# Patient Record
Sex: FEMALE | Race: WHITE | NOT HISPANIC OR LATINO | Employment: FULL TIME | URBAN - METROPOLITAN AREA
[De-identification: names, ages, dates, MRNs, and addresses within clinical notes are randomized per-mention and may not be internally consistent; named-entity substitution may affect disease eponyms.]

---

## 2023-03-06 LAB
ABO GROUP (TYPE) IN BLOOD: NORMAL
ANTIBODY SCREEN: NORMAL
DEHYDROEPIANDROSTERONE SULFATE (DHEA-S) (UG/DL) IN SER/: 103 UG/DL (ref 12–379)
ESTIMATED AVERAGE GLUCOSE FOR HBA1C: 103 MG/DL
HEMOGLOBIN A1C/HEMOGLOBIN TOTAL IN BLOOD: 5.2 %
HEPATITIS B VIRUS SURFACE AG PRESENCE IN SERUM: NONREACTIVE
HEPATITIS C VIRUS AB PRESENCE IN SERUM: NONREACTIVE
HIV 1/ 2 AG/AB SCREEN: NONREACTIVE
RH FACTOR: NORMAL
SYPHILIS TOTAL AB: NONREACTIVE
VARICELLA ZOSTER IGG: POSITIVE

## 2023-03-07 ENCOUNTER — TELEPHONE (OUTPATIENT)
Dept: PRIMARY CARE | Facility: CLINIC | Age: 33
End: 2023-03-07

## 2023-03-07 LAB
CHLAMYDIA TRACH., AMPLIFIED: NEGATIVE
N. GONORRHEA, AMPLIFIED: NEGATIVE

## 2023-03-10 LAB — ANTI-MULLERIAN HORMONE (AMH): 0.57 NG/ML (ref 0.18–11.71)

## 2023-03-13 LAB — 17-HYDROXYPROGESTERONE (REFLAB): 255.6 NG/DL

## 2023-03-17 LAB
TESTOSTERONE FREE (CHAN): 2.3 PG/ML (ref 0.1–6.4)
TESTOSTERONE,TOTAL,LC-MS/MS: 42 NG/DL (ref 2–45)

## 2023-04-19 ENCOUNTER — TELEPHONE (OUTPATIENT)
Dept: PRIMARY CARE | Facility: CLINIC | Age: 33
End: 2023-04-19

## 2023-05-19 LAB — PROGESTERONE (NG/ML) IN SER/PLAS: 0.9 NG/ML

## 2023-05-24 LAB
ESTRADIOL (PG/ML) IN SER/PLAS: 140 PG/ML
PROGESTERONE (NG/ML) IN SER/PLAS: 0.3 NG/ML

## 2023-05-31 LAB
ESTRADIOL (PG/ML) IN SER/PLAS: 116 PG/ML
PROGESTERONE (NG/ML) IN SER/PLAS: 0.6 NG/ML

## 2023-06-19 LAB
ESTRADIOL (PG/ML) IN SER/PLAS: 236 PG/ML
HEMATOCRIT (%) IN BLOOD BY AUTOMATED COUNT: 41 % (ref 36–46)

## 2023-06-23 LAB — ESTRADIOL (PG/ML) IN SER/PLAS: 120 PG/ML

## 2023-06-25 LAB
ESTRADIOL (PG/ML) IN SER/PLAS: 239 PG/ML
PROGESTERONE (NG/ML) IN SER/PLAS: 0.2 NG/ML

## 2023-06-27 LAB
ESTRADIOL (PG/ML) IN SER/PLAS: 534 PG/ML
PROGESTERONE (NG/ML) IN SER/PLAS: 0.6 NG/ML

## 2023-06-29 LAB
ESTRADIOL (PG/ML) IN SER/PLAS: 968 PG/ML
PROGESTERONE (NG/ML) IN SER/PLAS: 0.7 NG/ML

## 2023-07-10 DIAGNOSIS — G43.109 MIGRAINE WITH AURA AND WITHOUT STATUS MIGRAINOSUS, NOT INTRACTABLE: ICD-10-CM

## 2023-07-10 DIAGNOSIS — F41.9 ANXIETY: ICD-10-CM

## 2023-07-10 DIAGNOSIS — F43.0 ACUTE REACTION TO STRESS: Primary | ICD-10-CM

## 2023-07-10 RX ORDER — ATOGEPANT 60 MG/1
60 TABLET ORAL DAILY
Qty: 30 TABLET | Refills: 0 | Status: SHIPPED | OUTPATIENT
Start: 2023-07-10 | End: 2023-08-23 | Stop reason: SDUPTHER

## 2023-07-10 RX ORDER — ESCITALOPRAM OXALATE 10 MG/1
1 TABLET ORAL DAILY
COMMUNITY
Start: 2022-11-16 | End: 2023-07-10 | Stop reason: SDUPTHER

## 2023-07-10 RX ORDER — ESCITALOPRAM OXALATE 10 MG/1
10 TABLET ORAL DAILY
Qty: 30 TABLET | Refills: 0 | Status: SHIPPED | OUTPATIENT
Start: 2023-07-10 | End: 2023-08-15 | Stop reason: SDUPTHER

## 2023-07-10 RX ORDER — ATOGEPANT 60 MG/1
60 TABLET ORAL DAILY
COMMUNITY
Start: 2023-05-04 | End: 2023-07-10 | Stop reason: SDUPTHER

## 2023-07-25 LAB
ESTRADIOL (PG/ML) IN SER/PLAS: 506 PG/ML
PROGESTERONE (NG/ML) IN SER/PLAS: 0.6 NG/ML

## 2023-07-28 LAB
ESTRADIOL (PG/ML) IN SER/PLAS: 2863 PG/ML
PROGESTERONE (NG/ML) IN SER/PLAS: 0.5 NG/ML

## 2023-08-03 LAB — PROGESTERONE (NG/ML) IN SER/PLAS: 36.6 NG/ML

## 2023-08-14 LAB — CHORIOGONADOTROPIN (MIU/ML) IN SER/PLAS: 24 MIU/ML

## 2023-08-15 DIAGNOSIS — F41.9 ANXIETY: ICD-10-CM

## 2023-08-15 RX ORDER — ESCITALOPRAM OXALATE 10 MG/1
10 TABLET ORAL DAILY
Qty: 30 TABLET | Refills: 0 | Status: SHIPPED | OUTPATIENT
Start: 2023-08-15 | End: 2023-08-23 | Stop reason: SDUPTHER

## 2023-08-16 LAB — CHORIOGONADOTROPIN (MIU/ML) IN SER/PLAS: 37 MIU/ML

## 2023-08-18 LAB — CHORIOGONADOTROPIN (MIU/ML) IN SER/PLAS: 22 MIU/ML

## 2023-08-23 ENCOUNTER — OFFICE VISIT (OUTPATIENT)
Dept: PRIMARY CARE | Facility: CLINIC | Age: 33
End: 2023-08-23
Payer: COMMERCIAL

## 2023-08-23 VITALS
WEIGHT: 179 LBS | DIASTOLIC BLOOD PRESSURE: 78 MMHG | HEART RATE: 70 BPM | BODY MASS INDEX: 30.73 KG/M2 | SYSTOLIC BLOOD PRESSURE: 120 MMHG

## 2023-08-23 DIAGNOSIS — G43.109 MIGRAINE WITH AURA AND WITHOUT STATUS MIGRAINOSUS, NOT INTRACTABLE: Primary | ICD-10-CM

## 2023-08-23 DIAGNOSIS — F32.A ANXIETY AND DEPRESSION: ICD-10-CM

## 2023-08-23 DIAGNOSIS — F41.9 ANXIETY AND DEPRESSION: ICD-10-CM

## 2023-08-23 PROBLEM — Z15.01 GENETIC PREDISPOSITION TO BREAST CANCER: Status: ACTIVE | Noted: 2023-08-23

## 2023-08-23 PROBLEM — N91.2 AMENORRHEA: Status: RESOLVED | Noted: 2023-08-23 | Resolved: 2023-08-23

## 2023-08-23 PROBLEM — F32.0 DEPRESSION, MAJOR, SINGLE EPISODE, MILD (CMS-HCC): Status: RESOLVED | Noted: 2023-08-23 | Resolved: 2023-08-23

## 2023-08-23 PROBLEM — K50.00 CROHN'S DISEASE OF ILEUM WITHOUT COMPLICATION (MULTI): Status: ACTIVE | Noted: 2023-08-23

## 2023-08-23 PROBLEM — N91.2 AMENORRHEA: Status: ACTIVE | Noted: 2023-08-23

## 2023-08-23 PROBLEM — L50.8 CHRONIC URTICARIA: Status: ACTIVE | Noted: 2023-08-23

## 2023-08-23 PROBLEM — F90.2 ATTENTION DEFICIT HYPERACTIVITY DISORDER (ADHD), COMBINED TYPE, MODERATE: Status: ACTIVE | Noted: 2023-08-23

## 2023-08-23 PROBLEM — F44.5 CONVERSION DISORDER WITH SEIZURES OR CONVULSIONS: Status: ACTIVE | Noted: 2018-06-29

## 2023-08-23 PROBLEM — G43.719 INTRACTABLE CHRONIC MIGRAINE WITHOUT AURA AND WITHOUT STATUS MIGRAINOSUS: Status: RESOLVED | Noted: 2023-08-23 | Resolved: 2023-08-23

## 2023-08-23 PROBLEM — G43.719 INTRACTABLE CHRONIC MIGRAINE WITHOUT AURA AND WITHOUT STATUS MIGRAINOSUS: Status: ACTIVE | Noted: 2023-08-23

## 2023-08-23 PROBLEM — R19.7 DIARRHEA: Status: RESOLVED | Noted: 2023-08-23 | Resolved: 2023-08-23

## 2023-08-23 PROBLEM — F32.0 DEPRESSION, MAJOR, SINGLE EPISODE, MILD (CMS-HCC): Status: ACTIVE | Noted: 2023-08-23

## 2023-08-23 PROBLEM — K58.9 IBS (IRRITABLE BOWEL SYNDROME): Status: ACTIVE | Noted: 2023-08-23

## 2023-08-23 PROBLEM — N92.6 IRREGULAR MENSES: Status: RESOLVED | Noted: 2023-08-23 | Resolved: 2023-08-23

## 2023-08-23 PROBLEM — R19.7 DIARRHEA: Status: ACTIVE | Noted: 2023-08-23

## 2023-08-23 PROBLEM — K37 APPENDICITIS: Status: ACTIVE | Noted: 2020-04-20

## 2023-08-23 PROBLEM — N92.6 IRREGULAR MENSES: Status: ACTIVE | Noted: 2023-08-23

## 2023-08-23 PROBLEM — L50.8 CHRONIC URTICARIA: Status: RESOLVED | Noted: 2023-08-23 | Resolved: 2023-08-23

## 2023-08-23 PROCEDURE — 99214 OFFICE O/P EST MOD 30 MIN: CPT | Performed by: FAMILY MEDICINE

## 2023-08-23 RX ORDER — ATOGEPANT 60 MG/1
60 TABLET ORAL DAILY
Qty: 30 TABLET | Refills: 5 | Status: SHIPPED | OUTPATIENT
Start: 2023-08-23 | End: 2023-12-01 | Stop reason: ALTCHOICE

## 2023-08-23 RX ORDER — ESCITALOPRAM OXALATE 10 MG/1
10 TABLET ORAL DAILY
Qty: 90 TABLET | Refills: 1 | Status: SHIPPED | OUTPATIENT
Start: 2023-08-23 | End: 2023-12-01 | Stop reason: SDUPTHER

## 2023-08-23 ASSESSMENT — PATIENT HEALTH QUESTIONNAIRE - PHQ9
2. FEELING DOWN, DEPRESSED OR HOPELESS: NOT AT ALL
1. LITTLE INTEREST OR PLEASURE IN DOING THINGS: NOT AT ALL
SUM OF ALL RESPONSES TO PHQ9 QUESTIONS 1 AND 2: 0

## 2023-08-23 NOTE — PROGRESS NOTES
Subjective   Patient ID: Archana Mullen is a 33 y.o. female who presents for Anxiety, Depression, and Headache.    HPI  1.  Anxiety and depression.  Currently stable Lexapro 10 mg daily, not wanting to change dose  Feeling pretty good on medication, requesting refill  No SI/HI    2.  Migraine headaches.  Currently using Qulipta daily, and it has been a life-changing medication for her  She is so happy to be on the medication, as she does not have headache symptoms any longer after dealing with so many headaches and failed treatment regimens throughout her life    3.  Update in health.  She has been going through in vitro fertilization, recently became pregnant, miscarried at 4 weeks gestation  She will continue to try through [facility] under the guidance of Dr. Ricarda Rey    Review of Systems  All pertinent positive symptoms are included in the history of present illness.    All other systems have been reviewed and are negative and noncontributory to this patient's current ailments.    Allergies   Allergen Reactions    Bupropion Swelling and Rash    Penicillin V Rash     Penicillin V Potassium TABS       Immunization History   Administered Date(s) Administered    Flu vaccine (IIV4), preservative free *Check age/dose* 11/19/2019, 09/13/2020, 11/15/2021, 11/16/2022    Influenza, Unspecified 12/01/2017, 10/18/2018, 12/10/2018    MMR vaccine, subcutaneous (MMR II) 04/24/2001    Moderna SARS-CoV-2 Vaccination 05/07/2021, 06/08/2021, 12/30/2021    Pneumococcal conjugate vaccine, 13-valent (PREVNAR 13) 10/27/2017    Td (adult), unspecified 04/24/2001    Tdap vaccine, age 10 years and older (BOOSTRIX) 09/26/2015       Objective   Visit Vitals  /78   Pulse 70   Wt 81.2 kg (179 lb)   BMI 30.73 kg/m²   Smoking Status Never   BSA 1.92 m²       Physical Exam  CONSTITUTIONAL - well nourished, well developed, looks like stated age, in no acute distress, not ill-appearing, and not tired appearing  SKIN - normal skin color  and pigmentation, normal skin turgor without rash, lesions, or nodules visualized  HEAD - no trauma, normocephalic  EYES - pupils are equal and reactive to light, extraocular muscles are intact, and normal external exam  CHEST - clear to auscultation, no wheezing, no crackles and no rales, good effort  CARDIAC - regular rate and regular rhythm, no skipped beats, no murmur  EXTREMITIES - no edema, no deformities  NEUROLOGICAL - normal gait, normal balance, normal motor, no ataxia, alert, oriented and no focal signs  PSYCHIATRIC - alert, pleasant and cordial, age-appropriate    Assessment/Plan   Problem List Items Addressed This Visit       Migraine with aura and without status migrainosus, not intractable - Primary     Currently stable, no changes to medication recommended         Relevant Medications    atogepant (Qulipta) 60 mg tablet tablet    Anxiety and depression    Relevant Medications    escitalopram (Lexapro) 10 mg tablet

## 2023-08-25 LAB — CHORIOGONADOTROPIN (MIU/ML) IN SER/PLAS: <2 MIU/ML

## 2023-09-06 LAB
ESTRADIOL (PG/ML) IN SER/PLAS: 510 PG/ML
PROGESTERONE (NG/ML) IN SER/PLAS: 0.3 NG/ML

## 2023-09-14 LAB — PROGESTERONE (NG/ML) IN SER/PLAS: 26.6 NG/ML

## 2023-09-25 LAB — CHORIOGONADOTROPIN (MIU/ML) IN SER/PLAS: <2 MIU/ML

## 2023-10-05 DIAGNOSIS — N97.9 FEMALE INFERTILITY: ICD-10-CM

## 2023-10-05 NOTE — PROGRESS NOTES
Patient review with Dr. Whitley advising him that patient would like to proceed with another IVF cycle after her 2nd failed FET.   Dr. Whitley states same IVF stim cycle (Estrace lead in, Micro dose Lupron, /150, ).    Phone call to patient to discuss plan for current cycle is same IVF stim. Patient to have CD23 P4 level done on 10/23/23 (order placed). Patient will call office to schedule for lab work.     Patient considering PGT-A testing with this cycle but unsure due to cost, is aware to let RN staff know if desires to do this testing early in cycle. Patient verbalizes understanding at this time and denies additional questions.  Reviewed and approved by SETH CASTILLO on 10/5/23 at 5:00 PM.

## 2023-10-18 DIAGNOSIS — N97.9 FEMALE INFERTILITY: ICD-10-CM

## 2023-10-18 DIAGNOSIS — Z31.83 ENCOUNTER FOR ASSISTED REPRODUCTIVE FERTILITY CYCLE: ICD-10-CM

## 2023-10-18 RX ORDER — LEUPROLIDE ACETATE 1 MG/0.2ML
20 KIT SUBCUTANEOUS
Qty: 1 KIT | Refills: 1 | Status: SHIPPED | OUTPATIENT
Start: 2023-10-18 | End: 2023-12-19 | Stop reason: ALTCHOICE

## 2023-10-18 RX ORDER — ESTRADIOL 2 MG/1
2 TABLET ORAL 2 TIMES DAILY
Qty: 30 TABLET | Refills: 0 | Status: SHIPPED | OUTPATIENT
Start: 2023-10-18 | End: 2023-12-19 | Stop reason: ALTCHOICE

## 2023-10-18 RX ORDER — CHORIONIC GONADOTROPIN 10000 UNIT
10000 KIT INTRAMUSCULAR AS NEEDED
Qty: 1 EACH | Refills: 0 | Status: SHIPPED | OUTPATIENT
Start: 2023-10-18 | End: 2023-12-19 | Stop reason: ALTCHOICE

## 2023-10-19 ENCOUNTER — PHARMACY VISIT (OUTPATIENT)
Dept: PHARMACY | Facility: CLINIC | Age: 33
End: 2023-10-19
Payer: COMMERCIAL

## 2023-10-19 ENCOUNTER — SPECIALTY PHARMACY (OUTPATIENT)
Dept: PHARMACY | Facility: CLINIC | Age: 33
End: 2023-10-19

## 2023-10-19 DIAGNOSIS — N97.9 FEMALE INFERTILITY: ICD-10-CM

## 2023-10-19 PROBLEM — Z14.8 CARRIER OF HIGH RISK CANCER GENE MUTATION: Status: ACTIVE | Noted: 2023-10-19

## 2023-10-19 PROCEDURE — RXMED WILLOW AMBULATORY MEDICATION CHARGE

## 2023-10-19 RX ORDER — ALBUTEROL SULFATE 90 UG/1
AEROSOL, METERED RESPIRATORY (INHALATION)
COMMUNITY
Start: 2019-09-29 | End: 2023-11-11 | Stop reason: ALTCHOICE

## 2023-10-19 RX ORDER — TIZANIDINE 4 MG/1
1 TABLET ORAL 3 TIMES DAILY
COMMUNITY
Start: 2020-10-24 | End: 2023-11-11 | Stop reason: ALTCHOICE

## 2023-10-19 RX ORDER — CHORIOGONADOTROPIN ALFA 250 UG/.5ML
INJECTION, SOLUTION SUBCUTANEOUS
COMMUNITY
Start: 2023-01-18 | End: 2023-11-11 | Stop reason: ALTCHOICE

## 2023-10-19 RX ORDER — MAGNESIUM OXIDE/MAG AA CHELATE 300 MG
1 CAPSULE ORAL DAILY
COMMUNITY
End: 2023-11-11 | Stop reason: ALTCHOICE

## 2023-10-19 RX ORDER — CYCLOBENZAPRINE HCL 10 MG
1 TABLET ORAL NIGHTLY PRN
COMMUNITY
Start: 2020-02-04 | End: 2023-11-11 | Stop reason: ALTCHOICE

## 2023-10-19 RX ORDER — DICYCLOMINE HYDROCHLORIDE 20 MG/1
20 TABLET ORAL 4 TIMES DAILY PRN
COMMUNITY
Start: 2022-08-26 | End: 2023-11-11 | Stop reason: ALTCHOICE

## 2023-10-19 RX ORDER — ESTRADIOL 2 MG/1
3 TABLET ORAL DAILY
COMMUNITY
End: 2023-12-19 | Stop reason: ALTCHOICE

## 2023-10-19 RX ORDER — ERENUMAB-AOOE 140 MG/ML
INJECTION, SOLUTION SUBCUTANEOUS
COMMUNITY
Start: 2020-10-14 | End: 2023-11-11 | Stop reason: ALTCHOICE

## 2023-10-19 RX ORDER — VILAZODONE HYDROCHLORIDE 20 MG/1
1 TABLET ORAL DAILY
COMMUNITY
Start: 2022-05-10 | End: 2023-11-11 | Stop reason: ALTCHOICE

## 2023-10-19 RX ORDER — VILAZODONE HYDROCHLORIDE 10 MG/1
1 TABLET ORAL DAILY
COMMUNITY
Start: 2022-05-25 | End: 2023-11-11 | Stop reason: ALTCHOICE

## 2023-10-19 RX ORDER — SERTRALINE HYDROCHLORIDE 50 MG/1
50 TABLET, FILM COATED ORAL NIGHTLY
COMMUNITY
End: 2023-11-11 | Stop reason: ALTCHOICE

## 2023-10-19 RX ORDER — TOPIRAMATE 25 MG/1
25 TABLET ORAL NIGHTLY
COMMUNITY
End: 2023-11-11 | Stop reason: ALTCHOICE

## 2023-10-19 RX ORDER — LETROZOLE 2.5 MG/1
TABLET, FILM COATED ORAL
COMMUNITY
Start: 2022-12-29 | End: 2023-11-11 | Stop reason: ALTCHOICE

## 2023-10-19 RX ORDER — MELATONIN 3 MG
LOZENGE ORAL
COMMUNITY
End: 2023-11-11 | Stop reason: ALTCHOICE

## 2023-10-19 RX ORDER — GLUC/MSM/COLGN2/HYAL/ANTIARTH3 375-375-20
TABLET ORAL
COMMUNITY
End: 2023-11-11 | Stop reason: ALTCHOICE

## 2023-10-19 RX ORDER — ZINC SULFATE 50(220)MG
1 CAPSULE ORAL DAILY
COMMUNITY
End: 2023-11-11 | Stop reason: ALTCHOICE

## 2023-10-19 RX ORDER — ONDANSETRON 4 MG/1
4 TABLET, FILM COATED ORAL DAILY PRN
COMMUNITY
Start: 2020-04-20 | End: 2023-11-11 | Stop reason: ALTCHOICE

## 2023-10-19 RX ORDER — VARENICLINE TARTRATE 0.5 MG/1
TABLET, FILM COATED ORAL
COMMUNITY
End: 2023-11-11 | Stop reason: ALTCHOICE

## 2023-10-19 RX ORDER — ETONOGESTREL AND ETHINYL ESTRADIOL VAGINAL RING .015; .12 MG/D; MG/D
RING VAGINAL
COMMUNITY
End: 2023-11-11 | Stop reason: ALTCHOICE

## 2023-10-19 RX ORDER — TOPIRAMATE 50 MG/1
CAPSULE, EXTENDED RELEASE ORAL
COMMUNITY
End: 2023-11-11 | Stop reason: ALTCHOICE

## 2023-10-19 RX ORDER — SODIUM CHLORIDE 9 MG/ML
INJECTION INTRAMUSCULAR; INTRAVENOUS; SUBCUTANEOUS
Qty: 10 ML | Refills: 0 | Status: SHIPPED | OUTPATIENT
Start: 2023-10-19 | End: 2024-03-11 | Stop reason: ALTCHOICE

## 2023-10-19 RX ORDER — NAPROXEN 500 MG/1
500 TABLET ORAL
COMMUNITY
Start: 2020-10-24 | End: 2023-11-11 | Stop reason: ALTCHOICE

## 2023-10-19 RX ORDER — MEDROXYPROGESTERONE ACETATE 10 MG/1
10 TABLET ORAL
COMMUNITY
Start: 2022-09-22 | End: 2023-11-11 | Stop reason: ALTCHOICE

## 2023-10-20 ENCOUNTER — PHARMACY VISIT (OUTPATIENT)
Dept: PHARMACY | Facility: CLINIC | Age: 33
End: 2023-10-20
Payer: COMMERCIAL

## 2023-10-20 ENCOUNTER — ANCILLARY PROCEDURE (OUTPATIENT)
Dept: ENDOCRINOLOGY | Facility: CLINIC | Age: 33
End: 2023-10-20
Payer: COMMERCIAL

## 2023-10-20 DIAGNOSIS — N97.9 FEMALE INFERTILITY: ICD-10-CM

## 2023-10-20 LAB — PROGEST SERPL-MCNC: 9.7 NG/ML

## 2023-10-20 PROCEDURE — 36415 COLL VENOUS BLD VENIPUNCTURE: CPT

## 2023-10-20 PROCEDURE — 84144 ASSAY OF PROGESTERONE: CPT

## 2023-10-20 PROCEDURE — RXMED WILLOW AMBULATORY MEDICATION CHARGE

## 2023-10-20 NOTE — PROGRESS NOTES
P4 today for Estrace Lead In for a Microdose Lupron IVF cycle.   Shmuel Troy RN       [facility] PROVIDER NOTE  Ultrasound and/or labs reviewed at [facility].   Results for orders placed or performed in visit on 10/20/23 (from the past 96 hour(s))   Progesterone   Result Value Ref Range    Progesterone 9.7 ng/mL       Patient to start estrace lead in, will call with menses.    David Whitley    Phone call to patient, reviewed plan as noted above, patient verbalizes understanding at this time and denies additional questions.   Shmuel Troy RN 10/20/23 @ 1034

## 2023-10-25 ENCOUNTER — DOCUMENTATION (OUTPATIENT)
Dept: ENDOCRINOLOGY | Facility: CLINIC | Age: 33
End: 2023-10-25

## 2023-10-25 ENCOUNTER — TELEPHONE (OUTPATIENT)
Dept: ENDOCRINOLOGY | Facility: CLINIC | Age: 33
End: 2023-10-25

## 2023-10-25 ENCOUNTER — ANCILLARY PROCEDURE (OUTPATIENT)
Dept: ENDOCRINOLOGY | Facility: CLINIC | Age: 33
End: 2023-10-25
Payer: COMMERCIAL

## 2023-10-25 NOTE — PROGRESS NOTES
Called to review offsite storage mode. This is different than her last stim so wanted to advise. Aware we can no longer offer long term storage here at . Can choose [facility] or any other company as desired. She can let us know. Will upload consents to [facility] for her. Will ask Shae to reach out when available. All questions answered.   Ricarda Rey MD

## 2023-10-25 NOTE — PROGRESS NOTES
Boarding Pass IVF Checklist    Age: 33 y.o.  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  Last BMI  08/23/23 : 30.75 kg/m²       Past Medical History:   Diagnosis Date    Anxiety disorder, unspecified 11/16/2022    Anxiety    Chronic migraine without aura, intractable, with status migrainosus 08/01/2022    Chronic migraine without aura, with intractable migraine, so stated, with status migrainosus    Chronic migraine without aura, intractable, without status migrainosus 11/16/2022    Intractable chronic migraine without aura and without status migrainosus    Crohn's disease of small intestine without complications (CMS/HCC) 08/18/2022    Crohn's disease of ileum without complication    Other urticaria 11/11/2021    Chronic urticaria    Papillomavirus as the cause of diseases classified elsewhere     HPV in female    Personal history of other diseases of the female genital tract     History of abnormal cervical Papanicolaou smear     Ectopic Risk: N    Date Done Consultation Results/Comments   10/5/23 Medication Protocol Lead in: estrace  Stimulation protocol: Microdose Flare ,   Trigger plan: HCG  Pre-retrieval meds: Antibiotics per protocol  Adjuncts:   Notes:    5/17/23 IVF Consult   IVF Consult: Yes  PGT-A/M? No   10/27/23 IVF Consent Form Enroll in EngagedMD: Yes (Shmuel Troy RN)  Received and in chart: Yes (Shmuel Troy RN)   10/27/23 [facility] Waiver (out) Form Enroll in EngagedMD:   Received and in chart:    10/27.23 [employer] Transfer Authorization Form Enroll in EngagedMD: Yes  Received and in chart: Yes   N/A Insurance Financially Cleared? N/A  Procedure Order Placed? Yes   4/11/23 Financial Consult Yes   5/11/23 PAT Questionnaire  Anesthesia consult?: N   6/19/23 Nursing Teaching: Yes (Shmuel Troy RN)  Cycle Calendar: Yes (Shmuel Troy RN)  SART: N/A   N/A Genetic Carrier Screening Clearance     N/A MFM Consult Okay to proceed? N/A   N/A Psych Consult Okay to proceed? N/A   N/A Genetics Consult Okay to proceed? N/A    Other    Date Done Female Labs Results/Comments   3/6/2023 T&S (Q 1 Year) ABO: O  Rh: POS  Antibody: NEG   3/6/2023 Hep B sAg NONREACTIVE   3/6/2023 Hep C AB NONREACTIVE   3/6/2023 HIV NONREACTIVE   3/6/2023 Syphilis NONREACTIVE   3/6/2023 GC/CT GC: NEGATIVE  CT: NEGATIVE   2/21/29 Rubella (Q 5 Years) IMMUNE     3/6/2023 Varicella (Q 5 Years) POSITIVE     8/12/22 TSH 1.02   3/6/2023 HgbA1C 5.2 (Ref range: %)   3/6/2023 AMH 0.572   3/6/23 Carrier Screening Myriad 2bP: N/A  Authorization completed  Pos Ataxial telangiectasia, familial hyperinsulinism  ABCC8 related   3/17/23 Uterine Cavity Eval HSG: N/A    SIS: N/A    Hyster: (3/17/23 NL    Trial Transfer N/A   3/20/23 Pap Smear (Q 5 Years) NEG  HPV: NEG   N/A Mammogram ( > 40) (Q 1 Year) N/A               Date Done Male Labs  CRISTHIAN SALVADOR (MRN: [id number]) Results/Comments   3/6/2023 Hep B sAg NONREACTIVE   3/6/2023 Hep C AB  NONREACTIVE   3/6/2023 HIV NONREACTIVE   3/6/2023 Syphilis NONREACTIVE   3/6/2023 GC/CT GC: NEGATIVE  CT: NEGATIVE   3/20/23 Carrier Screening N/A  Authorization completed  Pos Congenital disorder glycosylation Type 1a, Cystinosis, Nephrotic Syndrome NPHS2 related   N/A Semen Analysis  Will connect fresh and has one frozen vial   6/23/23 Sperm Freeze  # of vials: 1  TMS post thaw: N/A   Date Done Miscellaneous Results/Comments             **Does not need to be completed prior to placing on IVF calendar**    Needs phone call from MD regarding new cryo shipment plan and will need new IVF consent and [facility] waiver OUT form prior to proceeding.  Ricarda Rey MD    Addendum: Both are now complete. Ok to proceed.   Ricarda Rey MD

## 2023-10-25 NOTE — TELEPHONE ENCOUNTER
Returned patient's call, started cycle today. Will take to noon huddle to discuss stim start and will call this afternoon with plan. Patient in agreement.  Shmuel Troy RN

## 2023-10-25 NOTE — PROGRESS NOTES
Called today with CD#1 to proceed with another IVF cycle.  Estrace lead in, Micro Flare , , Pregnyl Trigger.  Tentative plan:  Baseline US and labs on 10/26/23  Start Lupron 20 units evening of 10/27/23, then BID  Start stim meds evening of 10/28/23  RTC on 10/31/23 for repeat US and labs.    New BP needs reviewed for clearance to proceed.  Shmuel Troy RN     Agree with above plan and will review baseline tomorrow. Will review boarding pass today with FOREST Troy.   Ricarda Rey MD    Phone call to patient, reviewed plan as noted above, patient verbalizes understanding at this time and denies additional questions.   Shmuel Troy RN

## 2023-10-26 ENCOUNTER — ANCILLARY PROCEDURE (OUTPATIENT)
Dept: ENDOCRINOLOGY | Facility: CLINIC | Age: 33
End: 2023-10-26
Payer: COMMERCIAL

## 2023-10-26 DIAGNOSIS — N97.9 FEMALE INFERTILITY: ICD-10-CM

## 2023-10-26 LAB — ESTRADIOL SERPL-MCNC: 398 PG/ML

## 2023-10-26 PROCEDURE — 82670 ASSAY OF TOTAL ESTRADIOL: CPT

## 2023-10-26 PROCEDURE — 76857 US EXAM PELVIC LIMITED: CPT | Performed by: OBSTETRICS & GYNECOLOGY

## 2023-10-26 PROCEDURE — 36415 COLL VENOUS BLD VENIPUNCTURE: CPT

## 2023-10-26 PROCEDURE — 76857 US EXAM PELVIC LIMITED: CPT

## 2023-10-26 RX ORDER — LEUPROLIDE ACETATE 1 MG/0.2ML
20 KIT SUBCUTANEOUS EVERY MORNING
Qty: 1 KIT | Refills: 0 | Status: SHIPPED
Start: 2023-10-26 | End: 2023-12-19 | Stop reason: ALTCHOICE

## 2023-10-26 RX ORDER — LEUPROLIDE ACETATE 1 MG/0.2ML
20 KIT SUBCUTANEOUS EVERY EVENING
Qty: 1 KIT | Refills: 0 | Status: SHIPPED
Start: 2023-10-26 | End: 2023-12-19 | Stop reason: ALTCHOICE

## 2023-10-26 NOTE — PROGRESS NOTES
CYCLING NOTE    Here for US and/or lab monitoring for IVF; relevant findings reviewed.  Plan is for patient to start lupron 20 units on 10/27 and start stim meds on 10/28.   Patient did not stay for discussion after monitoring,  Team will contact patient later today with results and plan.    Archana Beckhamyarelymark  10/26/2023  1:37 PM    [facility] PROVIDER NOTE - BASELINE  Ultrasound and/or labs reviewed at [facility].     Major medical diagnoses:   no  Past Medical History:   Diagnosis Date    Anxiety disorder, unspecified 11/16/2022    Anxiety    Chronic migraine without aura, intractable, with status migrainosus 08/01/2022    Chronic migraine without aura, with intractable migraine, so stated, with status migrainosus    Chronic migraine without aura, intractable, without status migrainosus 11/16/2022    Intractable chronic migraine without aura and without status migrainosus    Crohn's disease of small intestine without complications (CMS/HCC) 08/18/2022    Crohn's disease of ileum without complication    Other urticaria 11/11/2021    Chronic urticaria    Papillomavirus as the cause of diseases classified elsewhere     HPV in female    Personal history of other diseases of the female genital tract     History of abnormal cervical Papanicolaou smear      Past Surgical History:   Procedure Laterality Date    OTHER SURGICAL HISTORY  12/21/2020    Colonoscopy    OTHER SURGICAL HISTORY  12/21/2020    Esophagogastroduodenoscopy    OTHER SURGICAL HISTORY  04/09/2021    Ovarian cystectomy    TONSILLECTOMY  12/07/2015    Tonsillectomy       Boarding pass   Yes    If indicated, is PAT consult complete?  No    SPERM:  Yes  partner  Fresh with Frozen Backup (retrograde ejaculation, will collect fresh from urine)  Number of vials confirmed: 1    Results for orders placed or performed in visit on 10/26/23 (from the past 96 hour(s))   Estradiol   Result Value Ref Range    Estradiol 398 pg/mL     Fri 10/27 (Day 3)   leuprolide injection:  Inject 20 Units once daily in the evening under the skin    Sat 10/28 (Day 4)   follitropin beta injection: Inject 300 Units once daily in the evening under the skin   menotropins recon soln injection: Inject 150 Units once daily in the evening under the skin   leuprolide injection: Inject 20 Units once daily in the morning under the skin   leuprolide injection: Inject 20 Units once daily in the evening under the skin    Sun 10/29 (Day 5)   follitropin beta injection: Inject 300 Units once daily in the evening under the skin   menotropins recon soln injection: Inject 150 Units once daily in the evening under the skin   leuprolide injection: Inject 20 Units once daily in the morning under the skin   leuprolide injection: Inject 20 Units once daily in the evening under the skin    Mon 10/30 (Day 6)   follitropin beta injection: Inject 300 Units once daily in the evening under the skin   menotropins recon soln injection: Inject 150 Units once daily in the evening under the skin   leuprolide injection: Inject 20 Units once daily in the morning under the skin   leuprolide injection: Inject 20 Units once daily in the evening under the skin    Tue 10/31 (Day 7)   leuprolide injection: Inject 20 Units once daily in the morning under the skin    RTC in  TUESDAY  for Follicle scan and Estradiol.   She cannot start injections until she and partner sign their engaged MD forms.     Ricarda Rey    Called pt to discuss plan. I told her she and her partner must sign engaged MD forms today. She is aware I resent them today and she is to check her email.   Reviewed medication plan. Pointed out to pt that the calendar Shae GARG sent her yesterday has her follistim dose incorrect 10/29-11/4; she is to take 300 international units  follistim every evening. She said she noticed the discrepancy and will change her calendar.   All meds reviewed. Will also send [employer] message with plan.  T/F to desk to schedule for 10/31. Pt verbalized  understanding and is agreeable.   CASIMIRO CALVERT on 10/26/23 at 3:04 PM.

## 2023-10-31 ENCOUNTER — ANCILLARY PROCEDURE (OUTPATIENT)
Dept: ENDOCRINOLOGY | Facility: CLINIC | Age: 33
End: 2023-10-31
Payer: COMMERCIAL

## 2023-10-31 DIAGNOSIS — N97.9 FEMALE INFERTILITY: ICD-10-CM

## 2023-10-31 LAB — ESTRADIOL SERPL-MCNC: 32 PG/ML

## 2023-10-31 PROCEDURE — 76857 US EXAM PELVIC LIMITED: CPT | Performed by: OBSTETRICS & GYNECOLOGY

## 2023-10-31 PROCEDURE — 82670 ASSAY OF TOTAL ESTRADIOL: CPT

## 2023-10-31 PROCEDURE — 76857 US EXAM PELVIC LIMITED: CPT

## 2023-10-31 PROCEDURE — 36415 COLL VENOUS BLD VENIPUNCTURE: CPT

## 2023-10-31 NOTE — PROGRESS NOTES
CYCLING NOTE    Here for US and/or lab monitoring; relevant findings reviewed.    Patient did not stay for discussion after monitoring,  Team will contact patient later today with results and plan.    Harriet Vazquez  10/31/2023  10:07 AM    [facility] PROVIDER NOTE  Ultrasound and/or labs reviewed at [facility].   Results for orders placed or performed in visit on 10/31/23 (from the past 96 hour(s))   Estradiol   Result Value Ref Range    Estradiol 32 pg/mL     Tue 10/31 (Day 7)   follitropin beta injection: Inject 300 Units once daily in the evening under the skin   menotropins recon soln injection: Inject 150 Units once daily in the evening under the skin   leuprolide injection: Inject 20 Units once daily in the morning under the skin   leuprolide injection: Inject 20 Units once daily in the evening under the skin    Wed 11/1 (Day 8)   follitropin beta injection: Inject 300 Units once daily in the evening under the skin   menotropins recon soln injection: Inject 150 Units once daily in the evening under the skin   leuprolide injection: Inject 20 Units once daily in the morning under the skin   leuprolide injection: Inject 20 Units once daily in the evening under the skin    Thu 11/2 (Day 9)   follitropin beta injection: Inject 300 Units once daily in the evening under the skin   menotropins recon soln injection: Inject 150 Units once daily in the evening under the skin   leuprolide injection: Inject 20 Units once daily in the morning under the skin   leuprolide injection: Inject 20 Units once daily in the evening under the skin    RTC in three days for Follicle scan and Estradiol.   Monalisa Mesa    Spoke to patient re: plan and she verbalized understanding.  Transferred to  to make apt for 11/3.  Harriet Vazquez RN 10/31/23 2:25 PM

## 2023-11-01 DIAGNOSIS — Z31.41 FERTILITY TESTING: ICD-10-CM

## 2023-11-02 ENCOUNTER — SPECIALTY PHARMACY (OUTPATIENT)
Dept: PHARMACY | Facility: CLINIC | Age: 33
End: 2023-11-02

## 2023-11-02 ENCOUNTER — PHARMACY VISIT (OUTPATIENT)
Dept: PHARMACY | Facility: CLINIC | Age: 33
End: 2023-11-02
Payer: COMMERCIAL

## 2023-11-02 PROCEDURE — RXMED WILLOW AMBULATORY MEDICATION CHARGE

## 2023-11-02 NOTE — PROGRESS NOTES
Pt called and had a question regarding storage/stability of Follistim.     Had a cartridge that she used back during July stimulation cycle still in her fridge- confirmed it had been punctured and used.     Advised that once cartridge is pierced, must be stored at 36°F to 77°F and used within 28 days and that she should discard.       Salena Camejo (Katie), PharmD  [facility]  Clinical Pharmacy Specialist- Fertility   [facility], [place] [address]  Email: [email or web address]  Tel: [phone]       Fax: [phone]

## 2023-11-03 ENCOUNTER — ANCILLARY PROCEDURE (OUTPATIENT)
Dept: ENDOCRINOLOGY | Facility: CLINIC | Age: 33
End: 2023-11-03
Payer: COMMERCIAL

## 2023-11-03 DIAGNOSIS — N97.9 FEMALE INFERTILITY: ICD-10-CM

## 2023-11-03 LAB — ESTRADIOL SERPL-MCNC: 148 PG/ML

## 2023-11-03 PROCEDURE — 76857 US EXAM PELVIC LIMITED: CPT | Performed by: OBSTETRICS & GYNECOLOGY

## 2023-11-03 PROCEDURE — 76857 US EXAM PELVIC LIMITED: CPT

## 2023-11-03 PROCEDURE — 36415 COLL VENOUS BLD VENIPUNCTURE: CPT

## 2023-11-03 PROCEDURE — 82670 ASSAY OF TOTAL ESTRADIOL: CPT

## 2023-11-03 NOTE — PROGRESS NOTES
CYCLING NOTE    Here for US and/or lab monitoring; relevant findings reviewed.  Patient stayed for nurse visit. Pain is 0/10  Team will contact patient later today with results and plan.    Shmuel Troy  11/03/2023  7:53 AM    [facility]  Ultrasound and/or labs reviewed at [facility].   Results for orders placed or performed in visit on 11/03/23 (from the past 96 hour(s))   Estradiol   Result Value Ref Range    Estradiol 148 pg/mL     Fri 11/3 (Day 10)   follitropin beta injection: Inject 300 Units once daily in the evening under the skin   menotropins recon soln injection: Inject 150 Units once daily in the evening under the skin   leuprolide injection: Inject 20 Units once daily in the morning under the skin   leuprolide injection: Inject 20 Units once daily in the evening under the skin    Sat 11/4 (Day 11)   follitropin beta injection: Inject 300 Units once daily in the evening under the skin   menotropins recon soln injection: Inject 150 Units once daily in the evening under the skin   leuprolide injection: Inject 20 Units once daily in the morning under the skin   leuprolide injection: Inject 20 Units once daily in the evening under the skin    Sun 11/5 (Day 12)   follitropin beta injection: Inject 300 Units once daily in the evening under the skin   menotropins recon soln injection: Inject 150 Units once daily in the evening under the skin   leuprolide injection: Inject 20 Units once daily in the morning under the skin   leuprolide injection: Inject 20 Units once daily in the evening under the skin    Mon 11/6 (Day 13)   leuprolide injection: Inject 20 Units once daily in the morning under the skin    RTC in three days for Follicle scan and Estradiol.   Ricarda Rey    Phone call to patient, reviewed plan as noted above, patient verbalizes understanding at this time and denies additional questions.   Shmuel Troy RN

## 2023-11-06 ENCOUNTER — SPECIALTY PHARMACY (OUTPATIENT)
Dept: PHARMACY | Facility: CLINIC | Age: 33
End: 2023-11-06

## 2023-11-06 ENCOUNTER — PHARMACY VISIT (OUTPATIENT)
Dept: PHARMACY | Facility: CLINIC | Age: 33
End: 2023-11-06
Payer: COMMERCIAL

## 2023-11-06 ENCOUNTER — ANCILLARY PROCEDURE (OUTPATIENT)
Dept: ENDOCRINOLOGY | Facility: CLINIC | Age: 33
End: 2023-11-06
Payer: COMMERCIAL

## 2023-11-06 DIAGNOSIS — N97.9 FEMALE INFERTILITY: ICD-10-CM

## 2023-11-06 LAB — ESTRADIOL SERPL-MCNC: 472 PG/ML

## 2023-11-06 PROCEDURE — 82670 ASSAY OF TOTAL ESTRADIOL: CPT

## 2023-11-06 PROCEDURE — 36415 COLL VENOUS BLD VENIPUNCTURE: CPT

## 2023-11-06 PROCEDURE — RXMED WILLOW AMBULATORY MEDICATION CHARGE

## 2023-11-06 PROCEDURE — 76857 US EXAM PELVIC LIMITED: CPT

## 2023-11-06 PROCEDURE — 76857 US EXAM PELVIC LIMITED: CPT | Performed by: OBSTETRICS & GYNECOLOGY

## 2023-11-06 NOTE — PROGRESS NOTES
CYCLING NOTE    Here for US and/or lab monitoring for IVF cycle; relevant findings reviewed.    Patient stayed for nurse visit. Pain is 0/10  Team will contact patient later today with results and plan.    Shmuel Troy  11/06/2023  8:27 AM    [facility] PROVIDER NOTE  Ultrasound and/or labs reviewed at [facility].   Results for orders placed or performed in visit on 11/06/23 (from the past 96 hour(s))   Estradiol   Result Value Ref Range    Estradiol 472 pg/mL     Mon 11/6 (Day 13)   follitropin beta injection: Inject 300 Units once daily in the evening under the skin   menotropins recon soln injection: Inject 150 Units once daily in the evening under the skin   leuprolide injection: Inject 20 Units once daily in the morning under the skin   leuprolide injection: Inject 20 Units once daily in the evening under the skin    Tue 11/7 (Day 14)   follitropin beta injection: Inject 300 Units once daily in the evening under the skin   menotropins recon soln injection: Inject 150 Units once daily in the evening under the skin   leuprolide injection: Inject 20 Units once daily in the morning under the skin   leuprolide injection: Inject 20 Units once daily in the evening under the skin    Wed 11/8 (Day 15)   leuprolide injection: Inject 20 Units once daily in the morning under the skin    RTC in two days for Follicle scan and Estradiol and Progesterone.   Monalisa Mesa     My Chart message sent to patient with above plan.   Shmuel Troy RN

## 2023-11-08 ENCOUNTER — ANCILLARY PROCEDURE (OUTPATIENT)
Dept: ENDOCRINOLOGY | Facility: CLINIC | Age: 33
End: 2023-11-08
Payer: COMMERCIAL

## 2023-11-08 DIAGNOSIS — Z31.41 FERTILITY TESTING: ICD-10-CM

## 2023-11-08 DIAGNOSIS — N97.9 FEMALE INFERTILITY: ICD-10-CM

## 2023-11-08 LAB
ESTRADIOL SERPL-MCNC: 878 PG/ML
PROGEST SERPL-MCNC: 0.6 NG/ML

## 2023-11-08 PROCEDURE — 36415 COLL VENOUS BLD VENIPUNCTURE: CPT

## 2023-11-08 PROCEDURE — 76857 US EXAM PELVIC LIMITED: CPT | Performed by: OBSTETRICS & GYNECOLOGY

## 2023-11-08 PROCEDURE — 82670 ASSAY OF TOTAL ESTRADIOL: CPT

## 2023-11-08 PROCEDURE — 84144 ASSAY OF PROGESTERONE: CPT

## 2023-11-08 PROCEDURE — 76857 US EXAM PELVIC LIMITED: CPT

## 2023-11-08 NOTE — PROGRESS NOTES
CYCLING NOTE    Here for US and/or lab monitoring for IVF cycle; elevant findings reviewed.  Patient of Dr. Whitley. Protocol- Estrace lead in MF  . 11/14/23.    Patient stayed for nurse visit. Pain is 0/10  Team will contact patient later today with results and plan.    Serinawily PRIETO Woodrow  11/08/2023  8:09 AM    [facility]  Ultrasound and/or labs reviewed at [facility].   Results for orders placed or performed in visit on 11/08/23 (from the past 96 hour(s))   Estradiol   Result Value Ref Range    Estradiol 878 pg/mL     Wed 11/8 (Day 15)   follitropin beta injection: Inject 300 Units once daily in the evening under the skin   menotropins recon soln injection: Inject 150 Units once daily in the evening under the skin   leuprolide injection: Inject 20 Units once daily in the morning under the skin   leuprolide injection: Inject 20 Units once daily in the evening under the skin    Thu 11/9 (Day 16)   leuprolide injection: Inject 20 Units once daily in the morning under the skin    RTC in one day for Follicle scan and Estradiol and Progesterone.   Monalisa Mesa    Phone call to patient, reviewed plan as noted above, patient verbalizes understanding at this time and denies additional questions. Will review add on progesterone level with Dr. Mesa when result obtained.  SETH CASTILLO on 11/8/23 at 1:59 PM.    Progesterone level result = 0.6. Reviewed with Dr. Mesa. No change to above plan after her review.  SETH CASTILLO on 11/8/23 at 4:05 PM.

## 2023-11-09 ENCOUNTER — APPOINTMENT (OUTPATIENT)
Dept: ENDOCRINOLOGY | Facility: CLINIC | Age: 33
End: 2023-11-09
Payer: COMMERCIAL

## 2023-11-09 ENCOUNTER — ANCILLARY PROCEDURE (OUTPATIENT)
Dept: ENDOCRINOLOGY | Facility: CLINIC | Age: 33
End: 2023-11-09

## 2023-11-09 ENCOUNTER — LAB (OUTPATIENT)
Dept: LAB | Facility: LAB | Age: 33
End: 2023-11-09
Payer: COMMERCIAL

## 2023-11-09 DIAGNOSIS — N97.9 FEMALE INFERTILITY: ICD-10-CM

## 2023-11-09 LAB
ESTRADIOL SERPL-MCNC: 1339 PG/ML
PROGEST SERPL-MCNC: 0.7 NG/ML

## 2023-11-09 PROCEDURE — 84144 ASSAY OF PROGESTERONE: CPT

## 2023-11-09 PROCEDURE — 76857 US EXAM PELVIC LIMITED: CPT | Performed by: OBSTETRICS & GYNECOLOGY

## 2023-11-09 PROCEDURE — 76857 US EXAM PELVIC LIMITED: CPT

## 2023-11-09 PROCEDURE — 36415 COLL VENOUS BLD VENIPUNCTURE: CPT

## 2023-11-09 PROCEDURE — 82670 ASSAY OF TOTAL ESTRADIOL: CPT

## 2023-11-09 NOTE — PROGRESS NOTES
CYCLING NOTE    Here for US and/or lab monitoring; relevant findings reviewed. On day 13 of medications for luteal estrace lead-in/mircoflare stim cycle.     Patient did not stay for discussion after monitoring, will confirm patient has pregnyl trigger.   Team will contact patient later today with results and plan.    HEATHER WEBSTER  11/09/2023  7:47 AM      [facility] PROVIDER NOTE - TRIGGER  Ultrasound and/or labs reviewed at [facility]. Patient ready for trigger.   Results for orders placed or performed in visit on 11/08/23 (from the past 96 hour(s))   Estradiol   Result Value Ref Range    Estradiol 878 pg/mL   Progesterone   Result Value Ref Range    Progesterone 0.6 ng/mL     Thu 11/9 (Day 16)   chorionic gonadotropin injection: Inject 10,000 Units if needed under the skin   leuprolide injection: Inject 20 Units once daily in the morning under the skin      Will trigger tonight at 8:30pm for retrieval at 8:30 am on 11/11/23 .   David Whitley       Placed call to patient to review plan as listed above. Patient will trigger tonight at 830pm with Pregnyl for a 830am retrieval on saturday. Patient instructed to take urine pregnancy test tomorrow morning and to call our clinic if test is negative. Patient instructed to come to clinic at 730am on Saturday. Retrieval day instructions reviewed. Patient verbalized understanding of plan, all questions answered at this time.   HEATHER WEBSTER RN 11/09/2023 13:43

## 2023-11-11 ENCOUNTER — PREP FOR PROCEDURE (OUTPATIENT)
Dept: ENDOCRINOLOGY | Facility: CLINIC | Age: 33
End: 2023-11-11

## 2023-11-11 ENCOUNTER — HOSPITAL ENCOUNTER (OUTPATIENT)
Dept: ENDOCRINOLOGY | Facility: CLINIC | Age: 33
Discharge: HOME | End: 2023-11-11
Payer: COMMERCIAL

## 2023-11-11 ENCOUNTER — ANESTHESIA (OUTPATIENT)
Dept: ENDOCRINOLOGY | Facility: CLINIC | Age: 33
End: 2023-11-11

## 2023-11-11 ENCOUNTER — ANESTHESIA EVENT (OUTPATIENT)
Dept: ENDOCRINOLOGY | Facility: CLINIC | Age: 33
End: 2023-11-11

## 2023-11-11 VITALS
DIASTOLIC BLOOD PRESSURE: 57 MMHG | SYSTOLIC BLOOD PRESSURE: 90 MMHG | HEART RATE: 70 BPM | BODY MASS INDEX: 31.58 KG/M2 | OXYGEN SATURATION: 100 % | TEMPERATURE: 97.7 F | WEIGHT: 184.97 LBS | RESPIRATION RATE: 18 BRPM | HEIGHT: 64 IN

## 2023-11-11 DIAGNOSIS — Z31.83 ENCOUNTER FOR ASSISTED REPRODUCTIVE FERTILITY CYCLE: ICD-10-CM

## 2023-11-11 LAB — PREGNANCY TEST URINE, POC: POSITIVE

## 2023-11-11 PROCEDURE — 58970 RETRIEVAL OF OOCYTE: CPT | Performed by: OBSTETRICS & GYNECOLOGY

## 2023-11-11 PROCEDURE — 89258 CRYOPRESERVATION EMBRYO(S): CPT | Performed by: OBSTETRICS & GYNECOLOGY

## 2023-11-11 PROCEDURE — 2500000005 HC RX 250 GENERAL PHARMACY W/O HCPCS: Performed by: NURSE ANESTHETIST, CERTIFIED REGISTERED

## 2023-11-11 PROCEDURE — 89290 BIOPSY OOCYTE POLAR BODY <=5: CPT | Performed by: OBSTETRICS & GYNECOLOGY

## 2023-11-11 PROCEDURE — 2500000004 HC RX 250 GENERAL PHARMACY W/ HCPCS (ALT 636 FOR OP/ED): Performed by: OBSTETRICS & GYNECOLOGY

## 2023-11-11 PROCEDURE — 2500000004 HC RX 250 GENERAL PHARMACY W/ HCPCS (ALT 636 FOR OP/ED): Performed by: NURSE ANESTHETIST, CERTIFIED REGISTERED

## 2023-11-11 PROCEDURE — 2500000004 HC RX 250 GENERAL PHARMACY W/ HCPCS (ALT 636 FOR OP/ED)

## 2023-11-11 PROCEDURE — 76948 ECHO GUIDE OVA ASPIRATION: CPT | Performed by: OBSTETRICS & GYNECOLOGY

## 2023-11-11 PROCEDURE — 89272 EXTENDED CULTURE OF OOCYTES: CPT | Performed by: OBSTETRICS & GYNECOLOGY

## 2023-11-11 PROCEDURE — 89260 SPERM ISOLATION SIMPLE: CPT | Performed by: OBSTETRICS & GYNECOLOGY

## 2023-11-11 PROCEDURE — 3700000002 HC GENERAL ANESTHESIA TIME - EACH INCREMENTAL 1 MINUTE: Performed by: OBSTETRICS & GYNECOLOGY

## 2023-11-11 PROCEDURE — 89281 ASSIST OOCYTE FERTILIZATION: CPT | Performed by: OBSTETRICS & GYNECOLOGY

## 2023-11-11 PROCEDURE — 3700000001 HC GENERAL ANESTHESIA TIME - INITIAL BASE CHARGE: Performed by: OBSTETRICS & GYNECOLOGY

## 2023-11-11 PROCEDURE — 89250 CULTR OOCYTE/EMBRYO <4 DAYS: CPT | Performed by: OBSTETRICS & GYNECOLOGY

## 2023-11-11 RX ORDER — OXYCODONE AND ACETAMINOPHEN 5; 325 MG/1; MG/1
1 TABLET ORAL EVERY 6 HOURS PRN
Status: DISCONTINUED | OUTPATIENT
Start: 2023-11-11 | End: 2023-11-12 | Stop reason: HOSPADM

## 2023-11-11 RX ORDER — PROPOFOL 10 MG/ML
INJECTION, EMULSION INTRAVENOUS CONTINUOUS PRN
Status: DISCONTINUED | OUTPATIENT
Start: 2023-11-11 | End: 2023-11-11

## 2023-11-11 RX ORDER — CEFAZOLIN SODIUM 2 G/100ML
2 INJECTION, SOLUTION INTRAVENOUS ONCE
Status: COMPLETED | OUTPATIENT
Start: 2023-11-11 | End: 2023-11-11

## 2023-11-11 RX ORDER — MORPHINE SULFATE 2 MG/ML
2 INJECTION, SOLUTION INTRAMUSCULAR; INTRAVENOUS AS NEEDED
Status: CANCELLED | OUTPATIENT
Start: 2023-11-11

## 2023-11-11 RX ORDER — KETOROLAC TROMETHAMINE 30 MG/ML
30 INJECTION, SOLUTION INTRAMUSCULAR; INTRAVENOUS ONCE AS NEEDED
Status: DISCONTINUED | OUTPATIENT
Start: 2023-11-11 | End: 2023-11-12 | Stop reason: HOSPADM

## 2023-11-11 RX ORDER — ONDANSETRON HYDROCHLORIDE 2 MG/ML
4 INJECTION, SOLUTION INTRAVENOUS AS NEEDED
Status: DISCONTINUED | OUTPATIENT
Start: 2023-11-11 | End: 2023-11-12 | Stop reason: HOSPADM

## 2023-11-11 RX ORDER — ACETAMINOPHEN 325 MG/1
650 TABLET ORAL ONCE AS NEEDED
Status: DISCONTINUED | OUTPATIENT
Start: 2023-11-11 | End: 2023-11-12 | Stop reason: HOSPADM

## 2023-11-11 RX ORDER — KETOROLAC TROMETHAMINE 30 MG/ML
30 INJECTION, SOLUTION INTRAMUSCULAR; INTRAVENOUS ONCE
Status: COMPLETED | OUTPATIENT
Start: 2023-11-11 | End: 2023-11-11

## 2023-11-11 RX ORDER — FENTANYL CITRATE 50 UG/ML
INJECTION, SOLUTION INTRAMUSCULAR; INTRAVENOUS AS NEEDED
Status: DISCONTINUED | OUTPATIENT
Start: 2023-11-11 | End: 2023-11-11

## 2023-11-11 RX ORDER — HYDROCODONE BITARTRATE AND ACETAMINOPHEN 5; 325 MG/1; MG/1
1 TABLET ORAL ONCE AS NEEDED
Status: CANCELLED | OUTPATIENT
Start: 2023-11-11

## 2023-11-11 RX ORDER — ONDANSETRON HYDROCHLORIDE 2 MG/ML
4 INJECTION, SOLUTION INTRAVENOUS AS NEEDED
Status: CANCELLED | OUTPATIENT
Start: 2023-11-11

## 2023-11-11 RX ORDER — KETOROLAC TROMETHAMINE 30 MG/ML
30 INJECTION, SOLUTION INTRAMUSCULAR; INTRAVENOUS ONCE AS NEEDED
Status: CANCELLED | OUTPATIENT
Start: 2023-11-11 | End: 2023-11-16

## 2023-11-11 RX ORDER — CEFAZOLIN SODIUM 2 G/100ML
2 INJECTION, SOLUTION INTRAVENOUS ONCE
Status: CANCELLED | OUTPATIENT
Start: 2023-11-11 | End: 2023-11-11

## 2023-11-11 RX ORDER — ACETAMINOPHEN 325 MG/1
650 TABLET ORAL ONCE AS NEEDED
Status: CANCELLED | OUTPATIENT
Start: 2023-11-11

## 2023-11-11 RX ORDER — KETOROLAC TROMETHAMINE 30 MG/ML
30 INJECTION, SOLUTION INTRAMUSCULAR; INTRAVENOUS ONCE
Status: CANCELLED | OUTPATIENT
Start: 2023-11-11 | End: 2023-11-11

## 2023-11-11 RX ORDER — OXYCODONE AND ACETAMINOPHEN 5; 325 MG/1; MG/1
1 TABLET ORAL EVERY 6 HOURS PRN
Status: CANCELLED | OUTPATIENT
Start: 2023-11-11

## 2023-11-11 RX ORDER — HYDROCODONE BITARTRATE AND ACETAMINOPHEN 5; 325 MG/1; MG/1
1 TABLET ORAL ONCE AS NEEDED
Status: DISCONTINUED | OUTPATIENT
Start: 2023-11-11 | End: 2023-11-12 | Stop reason: HOSPADM

## 2023-11-11 RX ORDER — MIDAZOLAM HYDROCHLORIDE 1 MG/ML
INJECTION, SOLUTION INTRAMUSCULAR; INTRAVENOUS AS NEEDED
Status: DISCONTINUED | OUTPATIENT
Start: 2023-11-11 | End: 2023-11-11

## 2023-11-11 RX ORDER — LIDOCAINE HYDROCHLORIDE 20 MG/ML
INJECTION, SOLUTION INFILTRATION; PERINEURAL AS NEEDED
Status: DISCONTINUED | OUTPATIENT
Start: 2023-11-11 | End: 2023-11-11

## 2023-11-11 RX ORDER — MORPHINE SULFATE 2 MG/ML
2 INJECTION, SOLUTION INTRAMUSCULAR; INTRAVENOUS AS NEEDED
Status: DISCONTINUED | OUTPATIENT
Start: 2023-11-11 | End: 2023-11-12 | Stop reason: HOSPADM

## 2023-11-11 RX ADMIN — CEFAZOLIN SODIUM 2 G: 2 INJECTION, SOLUTION INTRAVENOUS at 08:42

## 2023-11-11 RX ADMIN — LIDOCAINE HYDROCHLORIDE 50 ML: 20 INJECTION, SOLUTION INFILTRATION; PERINEURAL at 08:42

## 2023-11-11 RX ADMIN — KETOROLAC TROMETHAMINE 30 MG: 30 INJECTION, SOLUTION INTRAMUSCULAR; INTRAVENOUS at 09:25

## 2023-11-11 RX ADMIN — FENTANYL CITRATE 25 MCG: 50 INJECTION, SOLUTION INTRAMUSCULAR; INTRAVENOUS at 08:46

## 2023-11-11 RX ADMIN — SODIUM CHLORIDE, SODIUM LACTATE, POTASSIUM CHLORIDE, AND CALCIUM CHLORIDE: .6; .31; .03; .02 INJECTION, SOLUTION INTRAVENOUS at 08:34

## 2023-11-11 RX ADMIN — PROPOFOL 400 MCG/KG/MIN: 10 INJECTION, EMULSION INTRAVENOUS at 08:42

## 2023-11-11 RX ADMIN — FENTANYL CITRATE 25 MCG: 50 INJECTION, SOLUTION INTRAMUSCULAR; INTRAVENOUS at 09:02

## 2023-11-11 RX ADMIN — FENTANYL CITRATE 25 MCG: 50 INJECTION, SOLUTION INTRAMUSCULAR; INTRAVENOUS at 08:51

## 2023-11-11 RX ADMIN — FENTANYL CITRATE 25 MCG: 50 INJECTION, SOLUTION INTRAMUSCULAR; INTRAVENOUS at 08:40

## 2023-11-11 RX ADMIN — MIDAZOLAM 2 MG: 1 INJECTION INTRAMUSCULAR; INTRAVENOUS at 08:37

## 2023-11-11 ASSESSMENT — COLUMBIA-SUICIDE SEVERITY RATING SCALE - C-SSRS: 1. IN THE PAST MONTH, HAVE YOU WISHED YOU WERE DEAD OR WISHED YOU COULD GO TO SLEEP AND NOT WAKE UP?: NO

## 2023-11-11 ASSESSMENT — PAIN SCALES - GENERAL
PAINLEVEL_OUTOF10: 0 - NO PAIN

## 2023-11-11 ASSESSMENT — PAIN - FUNCTIONAL ASSESSMENT
PAIN_FUNCTIONAL_ASSESSMENT: 0-10

## 2023-11-11 NOTE — DISCHARGE INSTRUCTIONS
[facility]  [address]. [place]  [address]  Tel: [phone]   Fax: [phone]    Frozen Embryo Transfer Instructions    After your egg retrieval, follow up with your IVF nurse within 3-4 days Monday-Friday regarding the plan for your frozen embryo transfer (FET) cycle. Your IVF nurse will order and review with you the medications you will be using for the cycle.     You will also be sent an email from [facility] to fill out your Frozen Embryo Treatment Plan at this time. If you have not completed the frozen embryo treatment by the MORNING of your lining check appointment, we will not allow you to move forward with your FET that cycle.    Please call the office on the first full flow day of your period to speak with your IVF nurse.  If the first day of your cycle begins over the weekend, please call the office Monday morning.   Depending on our embryo transfer schedule and the medication protocol your doctor prescribes, you may not start your embryo transfer cycle medications immediately after your period starts. If appropriate for you, your doctor may have you start birth control to help time your embryo transfer cycle which means there may be a short delay in starting your FET cycle.   If you did PGT testing of your embryos, you will not start a frozen embryo transfer cycle until we have received your results. It can take up to a few weeks to receive these results. If appropriate, we may have you take birth control from the time your period starts until the results are ready.     You will be set up for a lining check ultrasound and labs mid cycle, and given a tentative embryo transfer date. You may require multiple lining checks at the discretion of your provider. After your provider determines your lining is adequate, we will determine what date you will begin your progesterone support and confirm the day of your embryo transfer.      The embryology lab will contact you the day before with the time of your embryo transfer and when to arrive.     Please make sure to drink 20oz. of water one hour before your scheduled arrival time.   You do not need to be on bedrest following your embryo transfer. You may resume normal activity following embryo transfer.   You will have your blood drawn on the day of transfer to check a progesterone level and you will receive a call that afternoon with your results.   Do not discontinue any of your medications unless instructed to by your provider.      Qing Velázquez RN    8:53 AM    [facility]  [address]. [place]  [address]   Tel: [phone]   Fax: [phone]    Home Going Instructions after Egg Retrieval:     Activity:   We do not recommend exercise on the day of or the day after your egg retrieval.   You can resume normal activities in 2-3 days, but please avoid exercise that involves jumping or bouncing.  If you are not having a fresh embryo transfer, you will likely start your period within 1-2 weeks and can resume all normal exercise at that time.   You may resume intercourse one week after your retrieval.     Anesthesia:  Drink small amounts of liquids initially and then slowly increase your intake of food on the day of your procedure. Drinking fluids will keep your bowels regular.   Avoid foods that are sweet, spicy or hard to digest today.  If you feel nauseated, rest your stomach for one hour, and then try drinking clear liquids again.  You may take a stool softener or miralax/milk of magnesia to help with constipation that may occur after anesthesia.  Please make sure a responsible adult is with you for at least 24 hours after surgery and do not drive or make important decisions during this time. Anesthesia may affect your judgment, coordination, and reaction time.    Follow up:  ALWAYS follow up with your primary nurse  a few days after your procedure to check in and make sure you know what your next steps are.     When to call your provider:  Vaginal bleeding that is more than a normal period that doesn't taper down within 6 hours.  Saturating a sanitary pad in 1-2 hours.  Any clots the size of an egg or bigger.  Fever of 100.4 or higher with chills.  Unusual or foul smelling discharge.  Discomfort from abdominal distension.     Notify your Physician's office if you develop any signs of Ovarian Hyperstimulation (OHSS):    -Abdominal bloating   -Rapid weight gain of 5-10 lbs. in 1-2 days  -Swelling in hands and feet  -Severe abdominal pain  -Shortness of breath   -Difficulty urinating or decreased urinary output   -Diarrhea    -Persistent nausea and vomiting  -Dizziness     These signs and symptoms can occur for up to 2 weeks following your oocyte retrieval. Call [phone] to speak with a Physician or Nurse if you have any concerns.    Qing Velázquez    8:53 AM

## 2023-11-11 NOTE — NURSING NOTE
Pt ambulated to wheelchair, no VB. Patient discharged to home in stable condition via wheelchair to RIDE HOME: Partner's car. Discharge instructions given and concerns addressed.    Qing Velázquez RN 11/11/23 10:50 AM

## 2023-11-11 NOTE — PROGRESS NOTES
Patient ID: Archana Mullen is a 33 y.o. female.    Egg Retrieval    Date/Time: 11/11/2023 9:22 AM    Performed by: David Whitley MD  Authorized by: KATHERINE Solano    Consent:     Consent obtained:  Verbal and written    Consent given by:  Patient    Procedure risks and benefits discussed: yes      Patient questions answered: yes      Patient agrees, verbalizes understanding, and wants to proceed: yes      Educational handouts given: yes      Instructions and paperwork completed: yes    Procedure:     Anesthesia:  MAC    Pelvic exam performed: Yes      Cervix cleaned and prepped: Yes      Speculum placed in vagina: Yes      Tenaculum applied to cervix: No      Ultrasound guidance: Yes      Left ovary:  Follicle present    Right ovary:  Follicle present    Pt. post-ovulatory: No      Speculum type: Sheba      Retrieval method: transvaginal      Needle inserted: Yes      Ovaries aspirated: Yes      Free fluid in pelvis: No      Estimated blood loss:  Minimal  Post-procedure:     Patient tolerated procedure well: yes    Comments:      Preop diagnosis: Female infertility   Post op diagnosis: Same  Assistant: none    IV Fluids: 400  cc  EBL: 10  cc  UOP: Not recorded    Specimen: Oocytes  Complications: None    Number of Oocytes right ovary: 4   Ovarian accèss (right): Easy  Number of Oocytes left ovary: 6  Ovarian access (left): Easy  Endometrial thickness: n/a  Needle type: Double  Additional notes:     Attending Attestation: I was physically present for key and critical portions performed by the fellow. I reviewed the fellow's documentation and discussed the patient with the fellow. I agree with the fellow's medical decision making as documented in the fellow's note.

## 2023-11-11 NOTE — ANESTHESIA POSTPROCEDURE EVALUATION
Patient: Archana Mullen    Procedure Summary       Date: 11/11/23 Room / Location:  [facility]    Anesthesia Start: 0832 Anesthesia Stop: 0932    Procedure: EGG RETRIEVAL Diagnosis: Encounter for assisted reproductive fertility cycle    Scheduled Providers: David Whitley MD Responsible Provider: Walker Sanchez MD    Anesthesia Type: MAC ASA Status: 2            Anesthesia Type: MAC    Vitals Value Taken Time   BP 90/57 11/11/23 1025   Temp 36.5 °C (97.7 °F) 11/11/23 0925   Pulse 70 11/11/23 1025   Resp 18 11/11/23 1025   SpO2 100 % 11/11/23 1025       Anesthesia Post Evaluation    Patient location during evaluation: bedside  Patient participation: complete - patient participated  Level of consciousness: awake and alert  Pain management: satisfactory to patient  Airway patency: patent  Cardiovascular status: acceptable  Respiratory status: acceptable  Hydration status: acceptable  Comments: No significant nausea.  No significant complications noted in recovery.        No notable events documented.

## 2023-11-11 NOTE — ANESTHESIA PREPROCEDURE EVALUATION
Patient: Archana Mullen    Procedure Information       Date/Time: 11/11/23 0830    Scheduled providers: David Whitley MD    Procedure: EGG RETRIEVAL    [facility]            Relevant Problems   GI   (+) Crohn's disease of ileum without complication (CMS/HCC)   (+) IBS (irritable bowel syndrome)      Neuro/Psych   (+) Anxiety and depression       Clinical information reviewed:   Tobacco  Allergies  Meds   Med Hx  Surg Hx   Fam Hx  Soc Hx        NPO/Void Status  Date of Last Liquid: 11/10/23  Time of Last Liquid: 2200  Date of Last Solid: 11/10/23  Time of Last Solid: 2200           Past Medical History:   Diagnosis Date    Anxiety disorder, unspecified 11/16/2022    Anxiety    Chronic migraine without aura, intractable, without status migrainosus 11/16/2022    Intractable chronic migraine without aura and without status migrainosus    Convulsion, non-epileptic (CMS/HCC) 2018    evaluated at     Crohn's disease of small intestine without complications (CMS/HCC) 08/18/2022    Crohn's disease of ileum without complication    Other urticaria 11/11/2021    Chronic urticaria    Papillomavirus as the cause of diseases classified elsewhere     HPV in female    Personal history of other diseases of the female genital tract     History of abnormal cervical Papanicolaou smear      Past Surgical History:   Procedure Laterality Date    APPENDECTOMY  04/20/2020    COLONOSCOPY      ESOPHAGOGASTRODUODENOSCOPY      OVARIAN CYST REMOVAL Right 08/18/2009    TONSILLECTOMY       Social History     Tobacco Use    Smoking status: Never    Smokeless tobacco: Never   Substance Use Topics    Alcohol use: Not Currently    Drug use: Never      Current Outpatient Medications   Medication Instructions    bacteriostatic sodium chloride (sodium chloride bacteriostatic) 0.9 % injection After adding 0.5 mL of Leuprolide to Bacteriostatic NaCl vial, using an insulin syringe draw up  "20 units and inject under the skin in the morning and in the evening as directed per provider for Microdose Lupron    chorionic gonadotropin (Pregnyl) 10,000 unit injection Reconstitute according to instructions and inject 10,000 units (1 mL) under the skin as a one time dose as directed per provider for trigger    escitalopram (LEXAPRO) 10 mg, oral, Daily    estradiol (Estrace) 2 mg tablet 3 tablets, oral, Daily, Can take all 3 at once, or one pill three times a day.     estradiol (ESTRACE) 2 mg, oral, 2 times daily    Follistim  Units, subcutaneous, Every evening, Inject under the skin on abdomen    leuprolide (Lupron) 1 mg/0.2 mL injection Draw up 0.5 mL of Leuprolide and inject into the Bacteriostatic NaCl as directed per provider for Microdose Lupron.    leuprolide (LUPRON) 20 Units, subcutaneous, Every evening, Draw up 0.5 mL of Leuprolide and inject into the Bacteriostatic NaCl as directed per provider for Microdose Lupron.    leuprolide (LUPRON) 20 Units, subcutaneous, Every morning, Draw up 0.5 mL of Leuprolide and inject into the Bacteriostatic NaCl as directed per provider for Microdose Lupron.    menotropins (Menopur) 75 unit recon soln injection Reconstitute and inject 150 units under the skin once daily in the evening.    prenatal no115/iron/folic acid (PRENATAL 19 ORAL) 1 capsule, oral, Daily    Qulipta 60 mg, oral, Daily    transparent dressings 2 3/8 X 2 3/4 \" bandage USE AS DIRECTED WITH LIDOCAINE CREAM    vedolizumab (ENTYVIO PEN SUBQ) subcutaneous, Every 8 weeks      Allergies   Allergen Reactions    Bupropion Swelling and Rash    Penicillin V Rash     Penicillin V Potassium TABS        Chemistry    Lab Results   Component Value Date/Time     11/11/2021 1605    K 4.4 11/11/2021 1605     11/11/2021 1605    CO2 25 11/11/2021 1605    BUN 11 11/11/2021 1605    CREATININE 0.75 11/11/2021 1605    Lab Results   Component Value Date/Time    CALCIUM 9.2 11/11/2021 1605    ALKPHOS 80 " "11/11/2021 1605    AST 24 11/11/2021 1605    ALT 14 11/11/2021 1605    BILITOT 0.3 11/11/2021 1605          Lab Results   Component Value Date/Time    WBC 8.2 11/11/2021 1605    HGB 12.5 11/11/2021 1605    HCT 41.0 06/19/2023 0908     11/11/2021 1605     No results found for: \"PROTIME\", \"PTT\", \"INR\"  No results found for this or any previous visit (from the past 4464 hour(s)).  No results found for this or any previous visit from the past 1095 days.       Visit Vitals  Pulse 89   Temp 36.5 °C (97.7 °F) (Oral)   Resp 16   Ht 1.626 m (5' 4\")   Wt 83.9 kg (184 lb 15.5 oz)   SpO2 98%   BMI 31.75 kg/m²   Smoking Status Never   BSA 1.95 m²        Anesthesia Evaluation      No history of anesthetic complications   Airway   Mallampati: II  TM distance: >3 FB  Neck ROM: full  Dental - normal exam     Pulmonary     breath sounds clear to auscultation  Cardiovascular     Rhythm: regular  Rate: normal    Neuro/Psych      GI/Hepatic/Renal      Endo/Other    Abdominal  - normal exam                      Physical Exam    Airway  Mallampati: II  TM distance: >3 FB  Neck ROM: full     Cardiovascular   Rhythm: regular  Rate: normal     Dental - normal exam     Pulmonary   Breath sounds clear to auscultation     Abdominal - normal exam              Anesthesia Plan    ASA 2     MAC     intravenous induction   Anesthetic plan and risks discussed with patient.        "

## 2023-11-13 ENCOUNTER — ANCILLARY PROCEDURE (OUTPATIENT)
Dept: ENDOCRINOLOGY | Facility: CLINIC | Age: 33
End: 2023-11-13
Payer: COMMERCIAL

## 2023-11-13 ENCOUNTER — CONSULT (OUTPATIENT)
Dept: ENDOCRINOLOGY | Facility: CLINIC | Age: 33
End: 2023-11-13
Payer: COMMERCIAL

## 2023-11-13 VITALS
WEIGHT: 189 LBS | HEIGHT: 64 IN | DIASTOLIC BLOOD PRESSURE: 74 MMHG | TEMPERATURE: 98.1 F | SYSTOLIC BLOOD PRESSURE: 107 MMHG | HEART RATE: 77 BPM | BODY MASS INDEX: 32.27 KG/M2

## 2023-11-13 DIAGNOSIS — N97.9 FEMALE INFERTILITY: Primary | ICD-10-CM

## 2023-11-13 DIAGNOSIS — G89.18 POST-OPERATIVE PAIN: ICD-10-CM

## 2023-11-13 DIAGNOSIS — G89.18 POST-OPERATIVE PAIN: Primary | ICD-10-CM

## 2023-11-13 LAB
ALBUMIN SERPL BCP-MCNC: 4.2 G/DL (ref 3.4–5)
ALP SERPL-CCNC: 52 U/L (ref 33–110)
ALT SERPL W P-5'-P-CCNC: 10 U/L (ref 7–45)
ANION GAP SERPL CALC-SCNC: 13 MMOL/L (ref 10–20)
AST SERPL W P-5'-P-CCNC: 19 U/L (ref 9–39)
BILIRUB SERPL-MCNC: 0.3 MG/DL (ref 0–1.2)
BUN SERPL-MCNC: 8 MG/DL (ref 6–23)
CALCIUM SERPL-MCNC: 8.9 MG/DL (ref 8.6–10.3)
CHLORIDE SERPL-SCNC: 104 MMOL/L (ref 98–107)
CO2 SERPL-SCNC: 25 MMOL/L (ref 21–32)
CREAT SERPL-MCNC: 0.78 MG/DL (ref 0.5–1.05)
ERYTHROCYTE [DISTWIDTH] IN BLOOD BY AUTOMATED COUNT: 12.4 % (ref 11.5–14.5)
GFR SERPL CREATININE-BSD FRML MDRD: >90 ML/MIN/1.73M*2
GLUCOSE SERPL-MCNC: 81 MG/DL (ref 74–99)
HCT VFR BLD AUTO: 37.1 % (ref 36–46)
HGB BLD-MCNC: 12.3 G/DL (ref 12–16)
MCH RBC QN AUTO: 31.2 PG (ref 26–34)
MCHC RBC AUTO-ENTMCNC: 33.2 G/DL (ref 32–36)
MCV RBC AUTO: 94 FL (ref 80–100)
NRBC BLD-RTO: 0 /100 WBCS (ref 0–0)
PLATELET # BLD AUTO: 231 X10*3/UL (ref 150–450)
POTASSIUM SERPL-SCNC: 4.5 MMOL/L (ref 3.5–5.3)
PROT SERPL-MCNC: 6.7 G/DL (ref 6.4–8.2)
RBC # BLD AUTO: 3.94 X10*6/UL (ref 4–5.2)
SODIUM SERPL-SCNC: 137 MMOL/L (ref 136–145)
WBC # BLD AUTO: 9.4 X10*3/UL (ref 4.4–11.3)

## 2023-11-13 PROCEDURE — 76856 US EXAM PELVIC COMPLETE: CPT

## 2023-11-13 PROCEDURE — 80053 COMPREHEN METABOLIC PANEL: CPT

## 2023-11-13 PROCEDURE — 36415 COLL VENOUS BLD VENIPUNCTURE: CPT | Performed by: STUDENT IN AN ORGANIZED HEALTH CARE EDUCATION/TRAINING PROGRAM

## 2023-11-13 PROCEDURE — 85027 COMPLETE CBC AUTOMATED: CPT | Performed by: STUDENT IN AN ORGANIZED HEALTH CARE EDUCATION/TRAINING PROGRAM

## 2023-11-13 PROCEDURE — 80053 COMPREHEN METABOLIC PANEL: CPT | Performed by: STUDENT IN AN ORGANIZED HEALTH CARE EDUCATION/TRAINING PROGRAM

## 2023-11-13 PROCEDURE — 76856 US EXAM PELVIC COMPLETE: CPT | Performed by: OBSTETRICS & GYNECOLOGY

## 2023-11-13 PROCEDURE — 36415 COLL VENOUS BLD VENIPUNCTURE: CPT

## 2023-11-13 PROCEDURE — 99213 OFFICE O/P EST LOW 20 MIN: CPT | Mod: GC,25 | Performed by: STUDENT IN AN ORGANIZED HEALTH CARE EDUCATION/TRAINING PROGRAM

## 2023-11-13 PROCEDURE — 85027 COMPLETE CBC AUTOMATED: CPT

## 2023-11-13 RX ORDER — OXYCODONE AND ACETAMINOPHEN 5; 325 MG/1; MG/1
1 TABLET ORAL EVERY 6 HOURS PRN
Qty: 10 TABLET | Refills: 0 | Status: SHIPPED | OUTPATIENT
Start: 2023-11-13 | End: 2023-11-20

## 2023-11-13 ASSESSMENT — PAIN SCALES - GENERAL: PAINLEVEL: 6

## 2023-11-13 NOTE — PROGRESS NOTES
"In Person    Post-retrieval Visit    Archana Mullen is a 33 year old POD 2 s/p oocyte retrieval who is presenting for evaluation for post-operative pain. She did a microflare protocol with an HCG trigger.    Reports the pain started immediately on Saturday. This is retrieval number 2 for her and she has not had this pain with her prior retrieval. Reports regular bowel movements. Pain is 8/10 and decreases to 4/10 with ibuprofen. Denies lightheadedness, dizziness. Some SOB- but secondary to pain not due to sensation of lack of oxygenation. Severe abdominal pain. Reports 5 pound weight gain. Denies vaginal bleeding or leg swelling. Denies nausea or vomiting.     Vitals: /74   Pulse 77   Temp 36.7 °C (98.1 °F)   Ht 1.626 m (5' 4\")   Wt 85.7 kg (189 lb)   LMP  (LMP Unknown)   BMI 32.44 kg/m²   Lungs: CTAB  Abdomen: soft, tender to palpation in lower quadrants, no rebound    Prior labs:   CBC  No results found for: \"WBC\", \"HGB\", \"HCT\", \"PLT\"  CMP  No results found for: \"GLUCOSE\", \"NA\", \"K\", \"CL\", \"CO2\", \"ANIONGAP\", \"BUN\", \"CREATININE\", \"EGFR\", \"CALCIUM\", \"ALBUMIN\", \"ALKPHOS\", \"PROT\", \"AST\", \"BILITOT\", \"ALT\"    TVUS: final read pending, however large stool burden seen without free fluid. + dopplers to ovaries bilaterally    Assessment/Plan: Archana Mullen is a 33 year old POD 2 s/p oocyte retrieval who is presenting with post-operative pain.     - Possibly secondary to large stool burden versus mild OHSS  - TVUS today with above results  - CBC and CMP collected, will call with abnormalities  - Rx sent for percocet 10 tabs  - Pt to call if symptoms worsen     D/w Dr. Angi Suggs  11/13/2023  9:28 AM  "

## 2023-11-13 NOTE — PROGRESS NOTES
I reviewed the resident/fellow's documentation and discussed the patient with the resident/fellow. I agree with the resident/fellow's medical decision making as documented in the note.     David Whitley MD

## 2023-11-20 NOTE — PROGRESS NOTES
[place] Interval FET Checklist    Age: 33 y.o.    Provider: David Whitley MD  Primary RN: N/A  Reasons for Treatment: Diminished Ovarian Reserve and Male infertility  Last BMI  23 : 32.44 kg/m²       Past Medical History:   Diagnosis Date    Anxiety disorder, unspecified 2022    Anxiety    Chronic migraine without aura, intractable, without status migrainosus 2022    Intractable chronic migraine without aura and without status migrainosus    Convulsion, non-epileptic (CMS/[facility]) 2018    evaluated at     Crohn's disease of small intestine without complications (CMS/[facility]) 2022    Crohn's disease of ileum without complication    Other urticaria 2021    Chronic urticaria    Papillomavirus as the cause of diseases classified elsewhere     HPV in female    Personal history of other diseases of the female genital tract     History of abnormal cervical Papanicolaou smear     Ectopic Risk: N    Date Done Consultation Results/Comments   10/25/23 Medication Protocol Lead in: OCP if needed to time transfer  Standard Programmed, 75mg SCOTT, Estrace 6mg   24 FET Treatment Form Enroll in EngagedMD: Yes (Shmuel Troy RN)  Received and in chart: Yes (Shmuel Troy RN)   23 IVF Consent Form Enroll in EngagedMD: Yes (Shmuel Troy RN)  Received and in chart: Yes (Shmuel Troy RN)   10/27/23  [place] (in) Form Enroll in EngagedMD:   Received and in chart:    10/27/23 [employer] Transfer Authorization Form Enroll in EngagedMD: Yes (Shmuel Troy RN)  Received and in chart: Yes (Shmuel Troy RN)   23 Embryo Ship In N/A   23 Procedure Order Placed Yes        N/A MFM Consult Okay to proceed? N/A   N/A Psych Consult Okay to proceed? N/A   N/A Genetics Consult Okay to proceed? N/A    Other    Date Done Female Labs Results/Comments   3/6/2023 T&S (Q 1 Year) ABO: O  Rh: POS  Antibody: NEG   3/6/2023 Hep B sAg NONREACTIVE   3/6/2023 Hep C AB NONREACTIVE    3/6/2023 HIV NONREACTIVE   3/6/2023 Syphilis NONREACTIVE   3/6/2023 GC/CT GC: NEGATIVE  CT: NEGATIVE   2/21/19 Rubella (Q 5 Years) IMMUNE     3/6/2023 Varicella (Q 5 Years) POSITIVE     8/12/22 TSH 1.02   3/6/2023 HgbA1C 5.2 (Ref range: %)   3/17/23 Uterine Cavity Eval HSG: N/A    SIS: N/A    Hyster: NL   3/20/23 Pap Smear (Q 5 Years)  NEG  HPV: NEG   N/A Mammogram ( > 40) (Q 1 Year) N/A               Date Done Miscellaneous Results/Comments   N/A BMI Checklist  BMI > 40 or < 18    N/A >= 45 Checklist     **Does not need to be completed prior to placing on IVF calendar**  Reviewed and approved by HIEN FRANCISCO on 1/3/24 at 3:48 PM.

## 2023-12-01 DIAGNOSIS — F32.A ANXIETY AND DEPRESSION: ICD-10-CM

## 2023-12-01 DIAGNOSIS — F41.9 ANXIETY AND DEPRESSION: ICD-10-CM

## 2023-12-01 RX ORDER — ESCITALOPRAM OXALATE 10 MG/1
10 TABLET ORAL DAILY
Qty: 90 TABLET | Refills: 0 | Status: SHIPPED | OUTPATIENT
Start: 2023-12-01 | End: 2024-03-11 | Stop reason: SDUPTHER

## 2023-12-18 ENCOUNTER — TELEPHONE (OUTPATIENT)
Dept: ENDOCRINOLOGY | Facility: CLINIC | Age: 33
End: 2023-12-18
Payer: COMMERCIAL

## 2023-12-18 NOTE — TELEPHONE ENCOUNTER
Returned patient's call, instructed to start her Estrace 6mg p.o. daily today and take every day. Advised patient her spouse needs to sign newest FET treatment plan and I can get her BP signed off. Will send My Chart message with tentative FET dates noted [facility]. Patient denies additional questions at this time.  SETH CASTILLO on 12/18/23 at 4:27 PM.

## 2023-12-19 DIAGNOSIS — Z31.83 ENCOUNTER FOR ASSISTED REPRODUCTIVE FERTILITY CYCLE: ICD-10-CM

## 2023-12-19 DIAGNOSIS — N97.9 FEMALE INFERTILITY: ICD-10-CM

## 2023-12-19 RX ORDER — LIDOCAINE AND PRILOCAINE 25; 25 MG/G; MG/G
CREAM TOPICAL
Qty: 30 G | Refills: 2 | Status: SHIPPED | OUTPATIENT
Start: 2023-12-19 | End: 2024-02-03

## 2023-12-19 RX ORDER — ESTRADIOL 2 MG/1
6 TABLET ORAL DAILY
Qty: 90 TABLET | Refills: 2 | Status: SHIPPED | OUTPATIENT
Start: 2023-12-19 | End: 2024-01-09

## 2023-12-19 RX ORDER — PROGESTERONE 50 MG/ML
75 INJECTION, SOLUTION INTRAMUSCULAR DAILY
Qty: 50 ML | Refills: 3 | Status: SHIPPED | OUTPATIENT
Start: 2023-12-19 | End: 2024-01-04 | Stop reason: SDUPTHER

## 2023-12-19 NOTE — PROGRESS NOTES
Patient called with menses for FET setup.   LMP: 12/18/23  Treatment Protocol: Standard Programmed Estrace 6mg every day, SCOTT 75mg IM  Tentative lining check: 1/3/24 (CD 17)  Tentative progesterone start: 1/4/23   Tentative transfer date: 1/9/23 (CD 23) with Dr. Rey  Number of days of progesterone for transfer: 6  Treatment plan completed: WAITING ON PARTNER TO SIGN  Embryo # confirmed: 2 blasts  Embryo # to transfer: 1  Shmuel Troy     Please confirm treatment plan at lining check  Reviewed and approved by ISAAC HANLEY on 12/19/23 at 1:07 PM.    My Chart message sent to patient with above noted plan.  CODEY Troy RN 12/19/23 @ 2266

## 2024-01-03 ENCOUNTER — ANCILLARY PROCEDURE (OUTPATIENT)
Dept: ENDOCRINOLOGY | Facility: CLINIC | Age: 34
End: 2024-01-03

## 2024-01-03 DIAGNOSIS — N97.9 FEMALE INFERTILITY: ICD-10-CM

## 2024-01-03 LAB
ESTRADIOL SERPL-MCNC: 589 PG/ML
PROGEST SERPL-MCNC: 0.5 NG/ML

## 2024-01-03 PROCEDURE — 84144 ASSAY OF PROGESTERONE: CPT

## 2024-01-03 PROCEDURE — 76857 US EXAM PELVIC LIMITED: CPT

## 2024-01-03 PROCEDURE — 82670 ASSAY OF TOTAL ESTRADIOL: CPT

## 2024-01-03 PROCEDURE — 36415 COLL VENOUS BLD VENIPUNCTURE: CPT

## 2024-01-03 PROCEDURE — 76857 US EXAM PELVIC LIMITED: CPT | Performed by: OBSTETRICS & GYNECOLOGY

## 2024-01-03 NOTE — PROGRESS NOTES
CYCLING NOTE    Here for US and/or lab monitoring for Lining Check for FET #3; relevant findings reviewed.    Patient did not stay for discussion after monitoring,  Team will contact patient later today with results and plan.    Shmuel Troy  01/03/2024  8:04 AM    HUDDLE PROVIDER NOTE  Ultrasound and/or labs reviewed at [facility].   Results for orders placed or performed in visit on 01/03/24 (from the past 96 hour(s))   Estradiol   Result Value Ref Range    Estradiol 589 pg/mL   Progesterone   Result Value Ref Range    Progesterone 0.5 ng/mL     Ultrasound and/or labs reviewed at [facility].   Results for orders placed or performed in visit on 01/03/24 (from the past 96 hour(s))   Estradiol   Result Value Ref Range    Estradiol 589 pg/mL   Progesterone   Result Value Ref Range    Progesterone 0.5 ng/mL     Wed 1/3 (Day 17)   estradiol tablet: Take 6 mg once daily by mouth    Thu 1/4 (Day 18)   progesterone injection: Inject 75 mg once daily into the muscle   estradiol tablet: Take 6 mg once daily by mouth    Fri 1/5 (Day 19)   progesterone injection: Inject 75 mg once daily into the muscle   estradiol tablet: Take 6 mg once daily by mouth    Sat 1/6 (Day 20)   progesterone injection: Inject 75 mg once daily into the muscle   estradiol tablet: Take 6 mg once daily by mouth    Sun 1/7 (Day 21)   progesterone injection: Inject 75 mg once daily into the muscle   estradiol tablet: Take 6 mg once daily by mouth    Mon 1/8 (Day 22)   progesterone injection: Inject 75 mg once daily into the muscle   estradiol tablet: Take 6 mg once daily by mouth    Tue 1/9 (Day 23)   progesterone injection: Inject 75 mg once daily into the muscle   estradiol tablet: Take 6 mg once daily by mouth      RTC in 1/9 for embryo transfer  Ricarda Rey  01/03/2024  1:11 PM    My Chart message sent to patient with above noted plan.  SHMUEL TROY on 1/3/24 at 1:24 PM.

## 2024-01-04 ENCOUNTER — PHARMACY VISIT (OUTPATIENT)
Dept: PHARMACY | Facility: CLINIC | Age: 34
End: 2024-01-04
Payer: COMMERCIAL

## 2024-01-04 DIAGNOSIS — N97.9 FEMALE INFERTILITY: ICD-10-CM

## 2024-01-04 PROCEDURE — RXMED WILLOW AMBULATORY MEDICATION CHARGE

## 2024-01-04 RX ORDER — PROGESTERONE 50 MG/ML
75 INJECTION, SOLUTION INTRAMUSCULAR DAILY
Qty: 50 ML | Refills: 3 | Status: SHIPPED | OUTPATIENT
Start: 2024-01-04 | End: 2024-03-11 | Stop reason: ALTCHOICE

## 2024-01-04 RX ORDER — SYRINGE, DISPOSABLE, 3 ML
SYRINGE, EMPTY DISPOSABLE MISCELLANEOUS
Qty: 30 EACH | Refills: 3 | Status: SHIPPED | OUTPATIENT
Start: 2024-01-04 | End: 2024-03-11 | Stop reason: ALTCHOICE

## 2024-01-04 NOTE — PROGRESS NOTES
Re-routed all fertility medications and supplies to [facility]  pharmacy per insurance mandate. Patient can still fill with [facility] for self-pay if needed.     Medications sent:   Progesterone in Oil 50mg/mL vials    Patient's insurance will only allow her to fill with [facility] through her insurance. Sent RX there for her. Patient aware.      Salena Camejo (Katie), PharmLILIAN  [facility]  Clinical Pharmacy Specialist- Fertility   [facility], [place]  [address] [place] [address]  Email: [email or web address]  Tel: [phone]       Fax: [phone]

## 2024-01-09 ENCOUNTER — PREP FOR PROCEDURE (OUTPATIENT)
Dept: ENDOCRINOLOGY | Facility: CLINIC | Age: 34
End: 2024-01-09
Payer: COMMERCIAL

## 2024-01-09 ENCOUNTER — HOSPITAL ENCOUNTER (OUTPATIENT)
Dept: ENDOCRINOLOGY | Facility: CLINIC | Age: 34
Discharge: HOME | End: 2024-01-09

## 2024-01-09 DIAGNOSIS — Z31.83 ENCOUNTER FOR ASSISTED REPRODUCTIVE FERTILITY CYCLE: ICD-10-CM

## 2024-01-09 DIAGNOSIS — N97.9 FEMALE INFERTILITY: ICD-10-CM

## 2024-01-09 DIAGNOSIS — Z32.00 UNCONFIRMED PREGNANCY: Primary | ICD-10-CM

## 2024-01-09 LAB — PROGEST SERPL-MCNC: 26.3 NG/ML

## 2024-01-09 PROCEDURE — 89253 EMBRYO HATCHING: CPT | Performed by: OBSTETRICS & GYNECOLOGY

## 2024-01-09 PROCEDURE — 76998 US GUIDE INTRAOP: CPT | Performed by: OBSTETRICS & GYNECOLOGY

## 2024-01-09 PROCEDURE — 89352 THAWING CRYOPRESRVED EMBRYO: CPT | Performed by: OBSTETRICS & GYNECOLOGY

## 2024-01-09 PROCEDURE — 58974 EMBRYO TRANSFER INTRAUTERINE: CPT | Performed by: OBSTETRICS & GYNECOLOGY

## 2024-01-09 PROCEDURE — 84144 ASSAY OF PROGESTERONE: CPT

## 2024-01-09 PROCEDURE — 36415 COLL VENOUS BLD VENIPUNCTURE: CPT | Performed by: NURSE PRACTITIONER

## 2024-01-09 PROCEDURE — 84144 ASSAY OF PROGESTERONE: CPT | Performed by: NURSE PRACTITIONER

## 2024-01-09 PROCEDURE — 89255 PREPARE EMBRYO FOR TRANSFER: CPT | Performed by: OBSTETRICS & GYNECOLOGY

## 2024-01-09 PROCEDURE — 36415 COLL VENOUS BLD VENIPUNCTURE: CPT

## 2024-01-09 RX ORDER — ESTRADIOL 2 MG/1
6 TABLET ORAL DAILY
Qty: 90 TABLET | Refills: 1 | Status: SHIPPED | OUTPATIENT
Start: 2024-01-09 | End: 2024-03-11 | Stop reason: ALTCHOICE

## 2024-01-09 RX ORDER — ACETAMINOPHEN 325 MG/1
650 TABLET ORAL ONCE AS NEEDED
Status: CANCELLED | OUTPATIENT
Start: 2024-01-09

## 2024-01-09 RX ORDER — ACETAMINOPHEN 325 MG/1
650 TABLET ORAL ONCE AS NEEDED
Status: DISCONTINUED | OUTPATIENT
Start: 2024-01-09 | End: 2024-01-10 | Stop reason: HOSPADM

## 2024-01-09 RX ORDER — ESTRADIOL 2 MG/1
6 TABLET ORAL DAILY
Qty: 90 TABLET | Refills: 2 | Status: CANCELLED | OUTPATIENT
Start: 2024-01-09

## 2024-01-09 NOTE — DISCHARGE INSTRUCTIONS
[facility]  [address]. [place]  [address]  Tel: [phone]   Fax: [phone]    Home Going Instructions after Embryo Transfer:     After completing your embryo transfer please treat your body as though you are pregnant.  No smoking, alcohol, or recreational drugs.  Make sure you are taking a prenatal vitamin daily.   Continue all medications currently prescribed for embryo transfer until otherwise instructed by your physician, even if you experience spotting or bleeding and even if you have a negative home pregnancy test.   Medications to avoid in pregnancy: Advil, Motrin, Ibuprofen, Aleve, Excedrin, Angy-[place], Sudafed, and Pepto-Bismol. Avoid aspirin unless you are taking this daily at the direction of your physician.   Medications that are generally safe in pregnancy: Tylenol, Benadryl, Plain Robitussin, Zyrtec, Claritin, Pepcid, Tums, Rolaids, Mylanta, Nasonex.   Foods to avoid:   Seafood that is high in mercury; you can have canned tuna twice a week.   Deli meats, deli salads, hot dogs, and unpasteurized cheeses due to the risk of Listeria.  Activities to avoid:   No hot tubs, whirlpools, or saunas.  Avoid starting new exercise routines that you have not done previously.  Avoid lifting > 30 lbs.  No cleaning litter boxes.  No intercourse until you test for pregnancy.   You do not need to be on bed rest following the transfer. It is healthy, normal, and recommended to go about routine physical activity.   We advise against taking a home pregnancy test due to the possibility of false negative and false positive results.   You will test for pregnancy in approximately 10 days, at which point you will be about 4 weeks pregnant.   If blood work was drawn on the day of your transfer, you can expect a phone that day with the results of your bloodwork. We expect a progesterone level greater than or equal to 16. If you level is less than 16  we will adjust your progesterone dose and repeat your level in 2 days.     Salena Bravo    10:33 AM

## 2024-01-12 ENCOUNTER — DOCUMENTATION (OUTPATIENT)
Dept: ENDOCRINOLOGY | Facility: CLINIC | Age: 34
End: 2024-01-12
Payer: COMMERCIAL

## 2024-01-12 NOTE — PROGRESS NOTES
Patient sent email to this RN:  Quick question. Can you confirm that it is ok to do my chrohns infusion next Tuesday? Or should I wait until after confirmation?    Reviewed with Dr. Whitley regarding her Entyvio infusion and below reply sent to patient:  Dr. Whitley looked up the technical particulars of the Entyvio and no harm during pregnancy, no precautions related to pregnancy and not taking. His thought is since it is meant to keep inflammation down that would likely be beneficial at this stage. He would recommend staying on your schedule. However, he did say if you would feel more comfortable waiting until after you do your pregnancy test he would be fine with that also since you would only be delayed by a few days.   Have a great weekend!  SETH CASTILLO on 1/12/24 at 10:15 AM.

## 2024-01-18 ENCOUNTER — TELEPHONE (OUTPATIENT)
Dept: ENDOCRINOLOGY | Facility: CLINIC | Age: 34
End: 2024-01-18
Payer: COMMERCIAL

## 2024-01-18 NOTE — TELEPHONE ENCOUNTER
Patient ha s a blood pregnancy test tomorrow following her FET concerned about weather may be late

## 2024-01-18 NOTE — TELEPHONE ENCOUNTER
Spoke to patient-- will move appointment time to 7:30 tomorrow, may be on time or may  be after this time. Told patient it is ok if she is a bit early or late due to weather and we will still see her.    01/18/24 at 10:22 AM - Ricarda Michele RN

## 2024-01-18 NOTE — TELEPHONE ENCOUNTER
Returned patient call, left voicemail letting patient know she can either move her appointment time to later in the morning if she doesn't think she will be able to get here by 7am, can also go to a  lab near home if desired so she does not have to travel further in the weather. Told her to call back to either move appointment time if desired or let us know if she wants to go to a lab so we can add to noon huddle list.    01/18/24 at 10:17 AM - Ricarda Michele RN

## 2024-01-19 ENCOUNTER — APPOINTMENT (OUTPATIENT)
Dept: ENDOCRINOLOGY | Facility: CLINIC | Age: 34
End: 2024-01-19
Payer: COMMERCIAL

## 2024-01-19 ENCOUNTER — ANCILLARY PROCEDURE (OUTPATIENT)
Dept: ENDOCRINOLOGY | Facility: CLINIC | Age: 34
End: 2024-01-19
Payer: COMMERCIAL

## 2024-01-19 DIAGNOSIS — Z32.00 UNCONFIRMED PREGNANCY: ICD-10-CM

## 2024-01-19 DIAGNOSIS — O36.80X0 ENCOUNTER TO DETERMINE FETAL VIABILITY OF PREGNANCY, SINGLE OR UNSPECIFIED FETUS (HHS-HCC): Primary | ICD-10-CM

## 2024-01-19 LAB — B-HCG SERPL-ACNC: 139 MIU/ML

## 2024-01-19 PROCEDURE — 84702 CHORIONIC GONADOTROPIN TEST: CPT

## 2024-01-19 PROCEDURE — 36415 COLL VENOUS BLD VENIPUNCTURE: CPT

## 2024-01-19 NOTE — PROGRESS NOTES
[facility]/p FET 1/9/24. Pt reports +HPT this morning. C/o itching at IM  prog in oil sites. Sites assessed. Bilateral sites WNL. No rash, hives, redness, edema, warmth. Pt told she can use topical benadryl on sites, but to let us know if she experiences: worsening itching, any development of hives, redness, rash, swelling, warmth, pain at sites or anywhere else on body. Will call pt with results and plan.    CASIMIRO CALVERT on 1/19/24 at 7:51 AM.

## 2024-01-19 NOTE — PROGRESS NOTES
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  Achieved pregnancy by: Embryo transfer  Fertility medications used this cycle:  programmed FET  LMP: Patient's last menstrual period was 23  EGA based on (IUI date, ET ): embryo transfer date= 4w1d    Updated G/P with this pregnancy:  (biochemical x 1 2023)  OB HX:  OB History    Para Term  AB Living   0 0 0 0 0 0   SAB IAB Ectopic Multiple Live Births   0 0 0 0 0       Type & Screen: O+(-)  History of miscarriage: No  History of pelvic/abdominal surgeries: No  History of STDs/PID: No  Hx of ectopic: No  Hx of tubal disease/ blockage: No    Risk for ectopic: No      Current medications:     Current Outpatient Medications:     bacteriostatic sodium chloride (sodium chloride bacteriostatic) 0.9 % injection, After adding 0.5 mL of Leuprolide to Bacteriostatic NaCl vial, using an insulin syringe draw up 20 units and inject under the skin in the morning and in the evening as directed per provider for Microdose Lupron, Disp: 10 mL, Rfl: 0    escitalopram (Lexapro) 10 mg tablet, Take 1 tablet (10 mg) by mouth once daily., Disp: 90 tablet, Rfl: 0    estradiol (Estrace) 2 mg tablet, Take 3 tablets (6 mg) by mouth once daily., Disp: 90 tablet, Rfl: 1    lidocaine-prilocaine (Emla) 2.5-2.5 % cream, Apply to treatment area 1 hour prior to injection., Disp: 30 g, Rfl: 2    needle, disp, 18 G 18 gauge x 1 1/2\" needle, Use as directed to draw up progesterone in oil, Disp: 30 each, Rfl: 3    needle, disp, 21 G 21 gauge x 2\" needle, Use as directed to inject Progesterone in Oil, Disp: 30 each, Rfl: 3    needle, disp, 22 G 22 gauge x 1 1/2\" needle, Use as directed to inject Progesterone in Oil, Disp: 30 each, Rfl: 3    prenatal no115/iron/folic acid (PRENATAL 19 ORAL), Take 1 capsule by mouth once daily., Disp: , Rfl:     progesterone 50 mg/mL injection, Draw up 1.5 mL (75mg) and " "inject into the muscle at the same time each morning as directed per provider., Disp: 50 mL, Rfl: 3    syringe, disposable, (BD Safety-Eusebio with Luer-Eusebio) 3 mL syringe, Use as directed to draw up and inject Progesterone in Oil, Disp: 30 each, Rfl: 3    transparent dressings 2 3/8 X 2 3/4 \" bandage, USE AS DIRECTED WITH LIDOCAINE CREAM, Disp: 30 each, Rfl: 3    vedolizumab (ENTYVIO PEN SUBQ), Inject under the skin every 8 (eight) weeks., Disp: , Rfl:     PNV: Yes  Vitamin D 2000 IUs: Yes- will start  Luteal support: IM SCOTT 75 mg daily and Estrace 6 mg PO daily  Additional medications: Shawna Clavert  01/19/2024  9:25 AM    Called pt with results. Will call her again with plan this afternoon after team meeting.     CASIMIRO CALVERT on 1/19/24 at 10:15 AM.       [facility] PROVIDER NOTE - HCG REVIEW  Ultrasound and/or labs reviewed at [facility].     No results found for this or any previous visit (from the past 96 hour(s)).    Lab Results   Component Value Date    HCGQUANT 139 (H) 01/19/2024    HCGQUANT <2 09/25/2023       RTC in four days for LABS ONLY VISIT and HCG.   David Whitley  01/19/2024  1:34 PM    [facility]. [facility] Pt verbalized understanding and is agreeable.   CASIMIRO CALVERT on 1/19/24 at 2:35 PM.           "

## 2024-01-22 ENCOUNTER — ANCILLARY PROCEDURE (OUTPATIENT)
Dept: ENDOCRINOLOGY | Facility: CLINIC | Age: 34
End: 2024-01-22
Payer: COMMERCIAL

## 2024-01-22 DIAGNOSIS — O36.80X0 ENCOUNTER TO DETERMINE FETAL VIABILITY OF PREGNANCY, SINGLE OR UNSPECIFIED FETUS (HHS-HCC): ICD-10-CM

## 2024-01-22 DIAGNOSIS — Z32.01 POSITIVE BLOOD PREGNANCY TEST (HHS-HCC): ICD-10-CM

## 2024-01-22 LAB — B-HCG SERPL-ACNC: 616 MIU/ML

## 2024-01-22 PROCEDURE — 84702 CHORIONIC GONADOTROPIN TEST: CPT

## 2024-01-22 PROCEDURE — 36415 COLL VENOUS BLD VENIPUNCTURE: CPT

## 2024-01-22 NOTE — PROGRESS NOTES
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  Achieved pregnancy by: Embryo transfer  Fertility medications used this cycle:  programmed FET  LMP: Patient's last menstrual period was 23  EGA based on (IUI date, ET ): embryo transfer date 2024= 4w4d  RACHEL: 2024     Updated G/P with this pregnancy:  (biochemical x 1 2023)  OB HX:          OB History    Para Term  AB Living   0 0 0 0 0 0   SAB IAB Ectopic Multiple Live Births    0 0 0 0 0          Type & Screen: O+(-)  History of miscarriage: No  History of pelvic/abdominal surgeries: No  History of STDs/PID: No  Hx of ectopic: No  Hx of tubal disease/ blockage: No     Risk for ectopic: No        Current medications:      Current Outpatient Medications:     bacteriostatic sodium chloride (sodium chloride bacteriostatic) 0.9 % injection, After adding 0.5 mL of Leuprolide to Bacteriostatic NaCl vial, using an insulin syringe draw up 20 units and inject under the skin in the morning and in the evening as directed per provider for Microdose Lupron, Disp: 10 mL, Rfl: 0    escitalopram (Lexapro) 10 mg tablet, Take 1 tablet (10 mg) by mouth once daily., Disp: 90 tablet, Rfl: 0    estradiol (Estrace) 2 mg tablet, Take 3 tablets (6 mg) by mouth once daily., Disp: 90 tablet, Rfl: 1    lidocaine-prilocaine (Emla) 2.5-2.5 % cream, Apply to treatment area 1 hour prior to injection., Disp: 30 g, Rfl: 2    needle, disp, 18 G 18 gauge x 1 1/2\" needle, Use as directed to draw up progesterone in oil, Disp: 30 each, Rfl: 3    needle, disp, 21 G 21 gauge x 2\" needle, Use as directed to inject Progesterone in Oil, Disp: 30 each, Rfl: 3    needle, disp, 22 G 22 gauge x 1 1/2\" needle, Use as directed to inject Progesterone in Oil, Disp: 30 each, Rfl: 3    prenatal no115/iron/folic acid (PRENATAL 19 ORAL), Take 1 capsule by mouth once daily., Disp: , Rfl:     " "progesterone 50 mg/mL injection, Draw up 1.5 mL (75mg) and inject into the muscle at the same time each morning as directed per provider., Disp: 50 mL, Rfl: 3    syringe, disposable, (BD Safety-Eusebio with Luer-Eusebio) 3 mL syringe, Use as directed to draw up and inject Progesterone in Oil, Disp: 30 each, Rfl: 3    transparent dressings 2 3/8 X 2 3/4 \" bandage, USE AS DIRECTED WITH LIDOCAINE CREAM, Disp: 30 each, Rfl: 3    vedolizumab (ENTYVIO PEN SUBQ), Inject under the skin every 8 (eight) weeks., Disp: , Rfl:      PNV: Yes  Vitamin D 2000 IUs: Yes- will start  Luteal support: IM SCOTT 75 mg daily and Estrace 6 mg PO daily  Additional medications: Lexapro 10mg    Archana Gomez RN 01/22/24 10:30 AM     [facility] PROVIDER NOTE - HCG REVIEW  Ultrasound and/or labs reviewed at [facility].     Results for orders placed or performed in visit on 01/22/24 (from the past 96 hour(s))   (Lab Collect) Human Chorionic Gonadotropin, Serum Quantitative   Result Value Ref Range    HCG, Beta-Quantitative 616 (H) <5 mIU/mL       Lab Results   Component Value Date    HCGQUANT 616 (H) 01/22/2024    HCGQUANT 139 (H) 01/19/2024     RTC in ONE WEEK for LABS ONLY VISIT and HCG.   Ricarda Rey  01/22/2024  1:42 PM    RN called patient with her plan to repeat her lab level in one week. Patient verbalized understanding. RN transferred patient to the  to schedule her lab appointment.   Archana Gomez RN 01/22/24 2:51 PM         "

## 2024-01-23 ENCOUNTER — TRANSCRIBE ORDERS (OUTPATIENT)
Dept: RADIOLOGY | Facility: CLINIC | Age: 34
End: 2024-01-23
Payer: COMMERCIAL

## 2024-01-23 DIAGNOSIS — N91.1 SECONDARY AMENORRHEA: Primary | ICD-10-CM

## 2024-01-29 ENCOUNTER — ANCILLARY PROCEDURE (OUTPATIENT)
Dept: ENDOCRINOLOGY | Facility: CLINIC | Age: 34
End: 2024-01-29
Payer: COMMERCIAL

## 2024-01-29 DIAGNOSIS — Z32.01 POSITIVE BLOOD PREGNANCY TEST (HHS-HCC): ICD-10-CM

## 2024-01-29 LAB — B-HCG SERPL-ACNC: ABNORMAL MIU/ML

## 2024-01-29 PROCEDURE — 84702 CHORIONIC GONADOTROPIN TEST: CPT

## 2024-01-29 PROCEDURE — 36415 COLL VENOUS BLD VENIPUNCTURE: CPT

## 2024-01-29 NOTE — PROGRESS NOTES
Patient here for repeat HCG s/p FET on 1/9/24.   EGA today = 5 4/7 weeks.   SETH CASTILLO on 1/29/24 at 8:04 AM.     [facility]  Ultrasound and/or labs reviewed at [facility].     Results for orders placed or performed in visit on 01/29/24 (from the past 96 hour(s))   (Clinic Collect) Human Chorionic Gonadotropin, Serum Quantitative   Result Value Ref Range    HCG, Beta-Quantitative 12,823 (H) <5 mIU/mL       Lab Results   Component Value Date    HCGQUANT 12,823 (H) 01/29/2024    HCGQUANT 616 (H) 01/22/2024    HCGQUANT 139 (H) 01/19/2024     RTC in AT APPROX 6 weeks 5 DAYS GESTATION for OB scan and  NO LABS .   Ricarda Rey  01/29/2024  1:30 PM    My Chart message sent to patient with above noted plan.  SETH CASTILLO on 1/29/24 at 1:55 PM.

## 2024-02-02 ENCOUNTER — TELEPHONE (OUTPATIENT)
Dept: ENDOCRINOLOGY | Facility: CLINIC | Age: 34
End: 2024-02-02
Payer: COMMERCIAL

## 2024-02-02 DIAGNOSIS — N97.9 FEMALE INFERTILITY: ICD-10-CM

## 2024-02-02 RX ORDER — PROGESTERONE 200 MG/1
200 CAPSULE ORAL DAILY
Qty: 30 CAPSULE | Refills: 0 | Status: SHIPPED | OUTPATIENT
Start: 2024-02-02 | End: 2024-03-11 | Stop reason: ALTCHOICE

## 2024-02-02 NOTE — TELEPHONE ENCOUNTER
Returned patient call regarding SCOTT Rx. Patient states pharmacy can't deliver refill until tomorrow. Patient states she takes PM dosing of SCOTT. Patient stated she has just under 1 ml of SCOTT for tonight and her dose is 1.5 ml. Patient states pharmacy assured her she will have her Rx by tomorrow. Per Dr. Whitley patient is to take what is left of SCOTT this evening and supplement with Prometrium 200 mg. Patient instructed Prometrium was sent to local pharmacy. Patient instructed to insert one 200 mg tablet when she has Rx and the other this evening. Patient instructed to resume normal SCOTT injections of 1.5 ml when refill arrives. Patient agreeable. Patient denies further questions and concerns at this time.   GERALD CALABRESE on 2/2/24 at 12:02 PM.

## 2024-02-08 ENCOUNTER — OFFICE VISIT (OUTPATIENT)
Dept: ENDOCRINOLOGY | Facility: CLINIC | Age: 34
End: 2024-02-08
Payer: COMMERCIAL

## 2024-02-08 ENCOUNTER — ANCILLARY PROCEDURE (OUTPATIENT)
Dept: ENDOCRINOLOGY | Facility: CLINIC | Age: 34
End: 2024-02-08
Payer: COMMERCIAL

## 2024-02-08 DIAGNOSIS — O36.80X0 ENCOUNTER TO DETERMINE FETAL VIABILITY OF PREGNANCY, NOT APPLICABLE OR UNSPECIFIED FETUS (HHS-HCC): Primary | ICD-10-CM

## 2024-02-08 DIAGNOSIS — O36.80X0 ENCOUNTER TO DETERMINE FETAL VIABILITY OF PREGNANCY, SINGLE OR UNSPECIFIED FETUS (HHS-HCC): Primary | ICD-10-CM

## 2024-02-08 DIAGNOSIS — O36.80X0 ENCOUNTER TO DETERMINE FETAL VIABILITY OF PREGNANCY, NOT APPLICABLE OR UNSPECIFIED FETUS (HHS-HCC): ICD-10-CM

## 2024-02-08 PROCEDURE — 76817 TRANSVAGINAL US OBSTETRIC: CPT

## 2024-02-08 PROCEDURE — 1036F TOBACCO NON-USER: CPT

## 2024-02-08 PROCEDURE — 99212 OFFICE O/P EST SF 10 MIN: CPT

## 2024-02-08 NOTE — PROGRESS NOTES
In Person    OB Scan    Ectopic risk factor: no  PGTA/PGTM: no  Blood type: O+  Meds: PNV daily, Estrace 6 mg p.o. daily, SCOTT 75 mg IM daily  Reviewed results from OB ultrasound today and results reviewed with pt as follows:     OB Scan results:   Conception 5 day FET 1-9-2024: 7 w + 0 d, RACHEL 9/26/2024  Stated RACHEL 7 w + 0 d, RACHEL 9/26/2024  U/S 2/8/2024 based upon CRL 6 w + 5 d, RACHEL 9/28/2024  Assessment Gestational sac: visualized. Location: intrauterine  Yolk sac: visualized  Embryo: visualized  Cardiac activity: present  Early placenta: circumferential  YS 2.6 mm  CRL 6.5 mm 6w 5d    bpm  Size = dates    Detailed discussion with patient about her scan results, pregnancy, and next steps:    Plan:   Reviewed medications in detail. She will continue PNV, Estrace, & SCOTT.   Reviewed supplemental progesterone, route and dose, reviewed dates of cessation. Last day to take Estrace & SCOTT is on 3-7-2024.  Discussed with patient any pregnancy concerns and discussed establishing care with an  OB. Has a new OB visit scheduled with Dr. Waddell 2-.  Will provide recommendations if she desires.   Advised to call with bleeding, pain or concerns. Denies any bleeding/pain.    Ultrasound results and Plan of care reviewed with Dr. David Whitley MD      02/08/24 at 2:15 PM - STEWART Yañez-CNP

## 2024-02-13 ENCOUNTER — TRANSCRIBE ORDERS (OUTPATIENT)
Dept: RADIOLOGY | Facility: CLINIC | Age: 34
End: 2024-02-13

## 2024-02-13 ENCOUNTER — HOSPITAL ENCOUNTER (OUTPATIENT)
Dept: RADIOLOGY | Facility: CLINIC | Age: 34
Discharge: HOME | End: 2024-02-13
Payer: COMMERCIAL

## 2024-02-13 ENCOUNTER — INITIAL PRENATAL (OUTPATIENT)
Dept: OBSTETRICS AND GYNECOLOGY | Facility: CLINIC | Age: 34
End: 2024-02-13
Payer: COMMERCIAL

## 2024-02-13 VITALS — SYSTOLIC BLOOD PRESSURE: 98 MMHG | WEIGHT: 191 LBS | BODY MASS INDEX: 32.79 KG/M2 | DIASTOLIC BLOOD PRESSURE: 64 MMHG

## 2024-02-13 DIAGNOSIS — Z33.1 PREGNANT STATE, INCIDENTAL (HHS-HCC): ICD-10-CM

## 2024-02-13 DIAGNOSIS — N91.1 SECONDARY AMENORRHEA: ICD-10-CM

## 2024-02-13 DIAGNOSIS — N91.1 AMENORRHEA, SECONDARY: ICD-10-CM

## 2024-02-13 DIAGNOSIS — Z36.82 NUCHAL TRANSLUCENCY OF FETUS ON PRENATAL ULTRASOUND (HHS-HCC): Primary | ICD-10-CM

## 2024-02-13 LAB
ABO GROUP (TYPE) IN BLOOD: NORMAL
ANTIBODY SCREEN: NORMAL
ERYTHROCYTE [DISTWIDTH] IN BLOOD BY AUTOMATED COUNT: 13 % (ref 11.5–14.5)
HBV SURFACE AG SERPL QL IA: NONREACTIVE
HCT VFR BLD AUTO: 39.4 % (ref 36–46)
HCV AB SER QL: NONREACTIVE
HGB BLD-MCNC: 12.7 G/DL (ref 12–16)
HIV 1+2 AB+HIV1 P24 AG SERPL QL IA: NONREACTIVE
MCH RBC QN AUTO: 30.3 PG (ref 26–34)
MCHC RBC AUTO-ENTMCNC: 32.2 G/DL (ref 32–36)
MCV RBC AUTO: 94 FL (ref 80–100)
NRBC BLD-RTO: 0 /100 WBCS (ref 0–0)
PLATELET # BLD AUTO: 266 X10*3/UL (ref 150–450)
RBC # BLD AUTO: 4.19 X10*6/UL (ref 4–5.2)
RH FACTOR (ANTIGEN D): NORMAL
RUBV IGG SERPL IA-ACNC: 1.5 IA
RUBV IGG SERPL QL IA: POSITIVE
TREPONEMA PALLIDUM IGG+IGM AB [PRESENCE] IN SERUM OR PLASMA BY IMMUNOASSAY: NONREACTIVE
WBC # BLD AUTO: 9.1 X10*3/UL (ref 4.4–11.3)

## 2024-02-13 PROCEDURE — 36415 COLL VENOUS BLD VENIPUNCTURE: CPT

## 2024-02-13 PROCEDURE — 76801 OB US < 14 WKS SINGLE FETUS: CPT | Performed by: OBSTETRICS & GYNECOLOGY

## 2024-02-13 PROCEDURE — 87086 URINE CULTURE/COLONY COUNT: CPT

## 2024-02-13 PROCEDURE — 86317 IMMUNOASSAY INFECTIOUS AGENT: CPT

## 2024-02-13 PROCEDURE — 86780 TREPONEMA PALLIDUM: CPT

## 2024-02-13 PROCEDURE — 86850 RBC ANTIBODY SCREEN: CPT

## 2024-02-13 PROCEDURE — 87340 HEPATITIS B SURFACE AG IA: CPT

## 2024-02-13 PROCEDURE — 87389 HIV-1 AG W/HIV-1&-2 AB AG IA: CPT

## 2024-02-13 PROCEDURE — 86803 HEPATITIS C AB TEST: CPT

## 2024-02-13 PROCEDURE — 0500F INITIAL PRENATAL CARE VISIT: CPT | Performed by: OBSTETRICS & GYNECOLOGY

## 2024-02-13 PROCEDURE — 88175 CYTOPATH C/V AUTO FLUID REDO: CPT

## 2024-02-13 PROCEDURE — 85027 COMPLETE CBC AUTOMATED: CPT

## 2024-02-13 PROCEDURE — 86901 BLOOD TYPING SEROLOGIC RH(D): CPT

## 2024-02-13 PROCEDURE — 86900 BLOOD TYPING SEROLOGIC ABO: CPT

## 2024-02-13 PROCEDURE — 87800 DETECT AGNT MULT DNA DIREC: CPT

## 2024-02-13 NOTE — PROGRESS NOTES
Chief Complaint   Patient presents with    Initial Prenatal Visit     New Ob  IVF transfer 1/9/24  Edc 9/26/24    C/o  some nausea     in room with patient     G2, P0, 1 chemical pregnancy.  Status post IVF transfer 5-day embryo.  Due date is 9/2624 based on IVF transfer date and 7-week ultrasound.    Mild nausea but has fatigue.  Overall feeling well.    REVIEW OF SYSTEMS    Please see HPI for reported pertinent positives, which would supersede this ROS    Constitutional:  Denies fever, chills, wt gain or loss, fatigue    Genito-Urinary:  Denies genital lesion or sores, vaginal dryness, itching  or pain.  No abnormal vaginal discharge or unexplained vaginal bleeding.  No dysuria, urinary incontinence or frequency.  Denies pelvic pain, dysmenorrhea or dyspareunia.    Eyes:  Denies vision changes, dryness  ENT:  No hearing loss, sinus pain or congestion, nosebleeds  Cardiovascular:  No chest pain or palpitations  Respiratory:  No SOB, cough, wheezing  GI:  No Nausea, vomiting, diarrhea, constipation, abdominal pain  Musculoskeletal:  No new back pain. joint pain, peripheral edema  Skin:  No rash or skin lesion  Neurologic:  No HA, numbness or dizziness  Psychiatric:  No new anxiety or depression  Endocrine:  No loss of hair or hirsutism  Hematologic/lymphatic:  No swollen lymph nodes.  No reported tendency for easy bruising or bleeding    Patient admits to no other systemic complaints      Vitals:    02/13/24 0821   BP: 98/64       PHYSICAL EXAM:    PSYCH:  Pt is alert, oriented and cooperative    SKIN: warm, dry, w/o lesion    HEAD AND FACE:  External inspection of eyes, ears, functional cranial nerves normal and intact    THYROID:  No thyromegaly    CARDIOVASCULAR:  Warm and well Perfused    PULMONARY:  No respiratory distress    ABDOMEN:  soft, nontender.  No mass or organomegaly.      BREAST:  Breasts are symmetric to inspection and palpation.  No mass palpable bilaterally.  There is no axillary  lymphadenopathy    PELVIC:    External genitalia, urethra, perianal region normal to inspection and palpation if indicated.  No inguinal LA    Vagina without lesions.    Cervix seen and visually normal      Bimanual exam:      No pelvic mass palpable    Uterus nontender, midline in pelvis    No adnexal masses or tenderness      Assessment/Plan    Diagnoses and all orders for this visit:  Amenorrhea, secondary  -     THINPREP PAP TEST  -     C. Trachomatis / N. Gonorrhoeae, Amplified Detection  -     Type And Screen  -     Hepatitis B Surface Antigen  -     Rubella Antibody, IgG  -     Hepatitis C Antibody  -     HIV 1/2 Antigen/Antibody Screen with Reflex to Confirmation  -     Syphilis Screen with Reflex  -     CBC  Pregnant state, incidental  -     THINPREP PAP TEST  -     C. Trachomatis / N. Gonorrhoeae, Amplified Detection  -     Type And Screen  -     Hepatitis B Surface Antigen  -     Rubella Antibody, IgG  -     Hepatitis C Antibody  -     HIV 1/2 Antigen/Antibody Screen with Reflex to Confirmation  -     Syphilis Screen with Reflex  -     CBC

## 2024-02-14 LAB
BACTERIA UR CULT: NORMAL
C TRACH RRNA SPEC QL NAA+PROBE: NEGATIVE
N GONORRHOEA DNA SPEC QL PROBE+SIG AMP: NEGATIVE

## 2024-02-25 LAB
CYTOLOGY CMNT CVX/VAG CYTO-IMP: NORMAL
LAB AP HPV GENOTYPE QUESTION: YES
LAB AP HPV HR: NORMAL
LABORATORY COMMENT REPORT: NORMAL
MENSTRUAL HX REPORTED: NORMAL
PATH REPORT.TOTAL CANCER: NORMAL

## 2024-03-02 DIAGNOSIS — N97.9 FEMALE INFERTILITY: ICD-10-CM

## 2024-03-07 RX ORDER — ESTRADIOL 2 MG/1
6 TABLET ORAL DAILY
Qty: 90 TABLET | Refills: 1 | OUTPATIENT
Start: 2024-03-07

## 2024-03-11 ENCOUNTER — OFFICE VISIT (OUTPATIENT)
Dept: PRIMARY CARE | Facility: CLINIC | Age: 34
End: 2024-03-11
Payer: COMMERCIAL

## 2024-03-11 VITALS
BODY MASS INDEX: 33.64 KG/M2 | SYSTOLIC BLOOD PRESSURE: 124 MMHG | WEIGHT: 196 LBS | HEART RATE: 68 BPM | DIASTOLIC BLOOD PRESSURE: 82 MMHG

## 2024-03-11 DIAGNOSIS — F41.9 ANXIETY AND DEPRESSION: Primary | ICD-10-CM

## 2024-03-11 DIAGNOSIS — Z3A.12 12 WEEKS GESTATION OF PREGNANCY (HHS-HCC): ICD-10-CM

## 2024-03-11 DIAGNOSIS — F32.A ANXIETY AND DEPRESSION: Primary | ICD-10-CM

## 2024-03-11 PROBLEM — K37 APPENDICITIS: Status: RESOLVED | Noted: 2020-04-20 | Resolved: 2024-03-11

## 2024-03-11 PROCEDURE — 1036F TOBACCO NON-USER: CPT | Performed by: FAMILY MEDICINE

## 2024-03-11 PROCEDURE — 99213 OFFICE O/P EST LOW 20 MIN: CPT | Performed by: FAMILY MEDICINE

## 2024-03-11 RX ORDER — ESCITALOPRAM OXALATE 10 MG/1
10 TABLET ORAL DAILY
Qty: 90 TABLET | Refills: 1 | Status: SHIPPED | OUTPATIENT
Start: 2024-03-11 | End: 2024-09-07

## 2024-03-11 ASSESSMENT — PATIENT HEALTH QUESTIONNAIRE - PHQ9
SUM OF ALL RESPONSES TO PHQ9 QUESTIONS 1 AND 2: 0
1. LITTLE INTEREST OR PLEASURE IN DOING THINGS: NOT AT ALL
2. FEELING DOWN, DEPRESSED OR HOPELESS: NOT AT ALL

## 2024-03-11 NOTE — ASSESSMENT & PLAN NOTE
Generally, we start to taper medication at 32 weeks gestation with the goal of discontinuation at 36 weeks gestation, and then we restart you back once you deliver

## 2024-03-11 NOTE — ASSESSMENT & PLAN NOTE
Congratulations on your pregnancy  Let me know if there is anything I can do along the way  I would be more than happy to care for your baby once born    Need to get your Tdap updated during your third trimester pregnancy

## 2024-03-11 NOTE — PROGRESS NOTES
Subjective   Patient ID: Archana Mullen is a 33 y.o. female who presents for Anxiety.    Past Medical, Surgical, and Family History reviewed and updated in chart.    Reviewed all medications by prescribing practitioner or clinical pharmacist (such as prescriptions, OTCs, herbal therapies and supplements) and documented in the medical record.    HPI  1.  Anxiety and depression.  Currently stable Lexapro 10 mg daily, not wanting to change dose  Feeling pretty good on medication, requesting refill  No SI/HI    2.  Migraine headaches.  No longer taking Qulipta secondary to recent pregnancy, see below    3.  Incidental pregnancy.  12 weeks pregnant, due on September 26 at [facility]  Dr. Yariel Waddell is her gynecologist and she hopes he will deliver the baby  She also hopes that we will take on her infant once he/she is born  Going for NIPT (Non-invasive prenatal) test soon    Review of Systems  All pertinent positive symptoms are included in the history of present illness.    All other systems have been reviewed and are negative and noncontributory to this patient's current ailments.    Past Medical History:   Diagnosis Date    Anxiety disorder, unspecified 11/16/2022    Anxiety    Chronic migraine without aura, intractable, without status migrainosus 11/16/2022    Intractable chronic migraine without aura and without status migrainosus    Convulsion, non-epileptic (CMS/HCC) 2018    evaluated at     Crohn's disease of small intestine without complications (CMS/HCC) 08/18/2022    Crohn's disease of ileum without complication    Other urticaria 11/11/2021    Chronic urticaria    Papillomavirus as the cause of diseases classified elsewhere     HPV in female    Personal history of other diseases of the female genital tract     History of abnormal cervical Papanicolaou smear     Past Surgical History:   Procedure Laterality Date    APPENDECTOMY  04/20/2020    COLONOSCOPY      ESOPHAGOGASTRODUODENOSCOPY      OVARIAN CYST  REMOVAL Right 08/18/2009    TONSILLECTOMY       Social History     Tobacco Use    Smoking status: Former     Types: Cigarettes     Passive exposure: Past    Smokeless tobacco: Never   Vaping Use    Vaping Use: Never used   Substance Use Topics    Alcohol use: Not Currently    Drug use: Never     Family History   Problem Relation Name Age of Onset    Stroke Maternal Grandmother      Lung cancer Maternal Grandmother      Colon cancer Maternal Grandfather      Stroke Paternal Grandmother      Inflammatory bowel disease Cousin Maternal      Immunization History   Administered Date(s) Administered    Flu vaccine (IIV4), preservative free *Check age/dose* 11/19/2019, 09/13/2020, 11/15/2021, 11/16/2022    Influenza, Unspecified 12/01/2017, 10/18/2018, 12/10/2018    MMR vaccine, subcutaneous (MMR II) 04/24/2001    Moderna SARS-CoV-2 Vaccination 05/07/2021, 06/08/2021, 12/30/2021    Pneumococcal conjugate vaccine, 13-valent (PREVNAR 13) 10/27/2017    Td (adult), unspecified 04/24/2001    Tdap vaccine, age 7 year and older (BOOSTRIX, ADACEL) 09/26/2015     Current Outpatient Medications   Medication Instructions    escitalopram (LEXAPRO) 10 mg, oral, Daily    prenatal no115/iron/folic acid (PRENATAL 19 ORAL) 1 capsule, oral, Daily    vedolizumab (ENTYVIO) 300 mg, intravenous, Once, For Maintenance: every 8 weeks     Allergies   Allergen Reactions    Bupropion Swelling and Rash    Penicillin V Rash     Penicillin V Potassium TABS       Objective   Vitals:    03/11/24 1319   BP: 124/82   Pulse: 68   Weight: 88.9 kg (196 lb)     Body mass index is 33.64 kg/m².    BP Readings from Last 3 Encounters:   03/11/24 124/82   02/13/24 98/64   11/13/23 107/74      Wt Readings from Last 3 Encounters:   03/11/24 88.9 kg (196 lb)   02/13/24 86.6 kg (191 lb)   11/13/23 85.7 kg (189 lb)        Initial Prenatal on 02/13/2024   Component Date Value    Case Report 02/13/2024                      Value:Gynecologic Cytology                               Case: [id number]                                   Authorizing Provider:  Yariel Waddell MD     Collected:           02/13/2024 0858              Ordering Location[facility]     [facility]       Received:            02/13/2024 0858              First Screen:          Savannah Kink, CT                                                                Specimen:    ThinPrep Liquid-Based Pap-Imaging System Screen, CERVIX, SCREENING                         Final Cytological Interp* 02/13/2024                      Value:This result contains rich text formatting which cannot be displayed here.      02/13/2024                      Value:This result contains rich text formatting which cannot be displayed here.    ThinPrep Imaging System 02/13/2024                      Value:This result contains rich text formatting which cannot be displayed here.    Educational Note 02/13/2024                      Value:This result contains rich text formatting which cannot be displayed here.    Perform HPV HR test? 02/13/2024 Reflex if ASCUS only     Include HPV Genotype? 02/13/2024 Yes     Menstrual status 02/13/2024                      Value:This result contains rich text formatting which cannot be displayed here.    Neisseria gonorrhea,Ampl* 02/13/2024 Negative     Chlamydia trachomatis, A* 02/13/2024 Negative     ABO TYPE 02/13/2024 O     Rh TYPE 02/13/2024 POS     ANTIBODY SCREEN 02/13/2024 NEG     Hepatitis B Surface AG 02/13/2024 Nonreactive     Rubella, IgG 02/13/2024 Positive     Rubella, IgG Index 02/13/2024 1.5     Hepatitis C AB 02/13/2024 Nonreactive     HIV 1/2 Antigen/Antibody* 02/13/2024 Nonreactive     Syphilis Total Ab 02/13/2024 Nonreactive     WBC 02/13/2024 9.1     nRBC 02/13/2024 0.0     RBC 02/13/2024 4.19     Hemoglobin 02/13/2024 12.7     Hematocrit 02/13/2024 39.4     MCV 02/13/2024 94     MCH 02/13/2024 30.3     MCHC 02/13/2024 32.2     RDW 02/13/2024 13.0     Platelets 02/13/2024 266     Urine Culture  02/13/2024 No significant growth      Physical Exam  CONSTITUTIONAL - well nourished, well developed, looks like stated age, in no acute distress, not ill-appearing, and not tired appearing  SKIN - normal skin color and pigmentation, normal skin turgor without rash, lesions, or nodules visualized  HEAD - no trauma, normocephalic  EYES - pupils are equal and reactive to light, extraocular muscles are intact, and normal external exam  CHEST - clear to auscultation, no wheezing, no crackles and no rales, good effort  CARDIAC - regular rate and regular rhythm, no skipped beats, no murmur  EXTREMITIES - no obvious or evident edema, no obvious or evident deformities  NEUROLOGICAL - normal gait, normal balance, normal motor, no ataxia, alert, oriented and no focal signs  PSYCHIATRIC - alert, pleasant and cordial, age-appropriate    Assessment/Plan   Problem List Items Addressed This Visit       Anxiety and depression - Primary     Generally, we start to taper medication at 32 weeks gestation with the goal of discontinuation at 36 weeks gestation, and then we restart you back once you deliver         Relevant Medications    escitalopram (Lexapro) 10 mg tablet    12 weeks gestation of pregnancy     Congratulations on your pregnancy  Let me know if there is anything I can do along the way  I would be more than happy to care for your baby once born    Need to get your Tdap updated during your third trimester pregnancy

## 2024-03-12 ENCOUNTER — HOSPITAL ENCOUNTER (OUTPATIENT)
Dept: RADIOLOGY | Facility: CLINIC | Age: 34
Discharge: HOME | End: 2024-03-12
Payer: COMMERCIAL

## 2024-03-12 ENCOUNTER — ROUTINE PRENATAL (OUTPATIENT)
Dept: OBSTETRICS AND GYNECOLOGY | Facility: CLINIC | Age: 34
End: 2024-03-12
Payer: COMMERCIAL

## 2024-03-12 ENCOUNTER — TRANSCRIBE ORDERS (OUTPATIENT)
Dept: RADIOLOGY | Facility: CLINIC | Age: 34
End: 2024-03-12

## 2024-03-12 VITALS — BODY MASS INDEX: 33.64 KG/M2 | SYSTOLIC BLOOD PRESSURE: 98 MMHG | WEIGHT: 196 LBS | DIASTOLIC BLOOD PRESSURE: 64 MMHG

## 2024-03-12 DIAGNOSIS — Z3A.20 20 WEEKS GESTATION OF PREGNANCY (HHS-HCC): ICD-10-CM

## 2024-03-12 DIAGNOSIS — Z34.82 ENCOUNTER FOR SUPERVISION OF NORMAL PREGNANCY IN MULTIGRAVIDA IN SECOND TRIMESTER (HHS-HCC): ICD-10-CM

## 2024-03-12 DIAGNOSIS — Z36.82 NUCHAL TRANSLUCENCY OF FETUS ON PRENATAL ULTRASOUND (HHS-HCC): ICD-10-CM

## 2024-03-12 DIAGNOSIS — Z3A.11 11 WEEKS GESTATION OF PREGNANCY (HHS-HCC): ICD-10-CM

## 2024-03-12 PROCEDURE — 36415 COLL VENOUS BLD VENIPUNCTURE: CPT

## 2024-03-12 PROCEDURE — 76813 OB US NUCHAL MEAS 1 GEST: CPT | Performed by: OBSTETRICS & GYNECOLOGY

## 2024-03-12 PROCEDURE — 0501F PRENATAL FLOW SHEET: CPT | Performed by: OBSTETRICS & GYNECOLOGY

## 2024-04-08 ENCOUNTER — APPOINTMENT (OUTPATIENT)
Dept: OBSTETRICS AND GYNECOLOGY | Facility: CLINIC | Age: 34
End: 2024-04-08
Payer: COMMERCIAL

## 2024-04-08 ENCOUNTER — PATIENT MESSAGE (OUTPATIENT)
Dept: OBSTETRICS AND GYNECOLOGY | Facility: CLINIC | Age: 34
End: 2024-04-08

## 2024-04-08 NOTE — TELEPHONE ENCOUNTER
From: Archana Mullen  To: Yariel Waddell MD  Sent: 4/8/2024 9:28 AM EDT  Subject: Heartburn    Is there something I can take besides Tums for heartburn?

## 2024-04-11 PROBLEM — Z3A.12 12 WEEKS GESTATION OF PREGNANCY (HHS-HCC): Status: RESOLVED | Noted: 2024-03-11 | Resolved: 2024-04-11

## 2024-04-12 ENCOUNTER — ROUTINE PRENATAL (OUTPATIENT)
Dept: OBSTETRICS AND GYNECOLOGY | Facility: CLINIC | Age: 34
End: 2024-04-12
Payer: COMMERCIAL

## 2024-04-12 VITALS — WEIGHT: 198 LBS | SYSTOLIC BLOOD PRESSURE: 112 MMHG | BODY MASS INDEX: 33.99 KG/M2 | DIASTOLIC BLOOD PRESSURE: 70 MMHG

## 2024-04-12 DIAGNOSIS — Z34.82 MULTIGRAVIDA IN SECOND TRIMESTER (HHS-HCC): Primary | ICD-10-CM

## 2024-04-12 PROCEDURE — 0501F PRENATAL FLOW SHEET: CPT | Performed by: OBSTETRICS & GYNECOLOGY

## 2024-04-29 ENCOUNTER — OFFICE VISIT (OUTPATIENT)
Dept: PRIMARY CARE | Facility: CLINIC | Age: 34
End: 2024-04-29
Payer: COMMERCIAL

## 2024-04-29 VITALS
DIASTOLIC BLOOD PRESSURE: 80 MMHG | BODY MASS INDEX: 34.15 KG/M2 | SYSTOLIC BLOOD PRESSURE: 126 MMHG | HEART RATE: 62 BPM | WEIGHT: 200 LBS | HEIGHT: 64 IN

## 2024-04-29 DIAGNOSIS — D48.5 NEOPLASM OF UNCERTAIN BEHAVIOR OF SKIN OF ABDOMEN: Primary | ICD-10-CM

## 2024-04-29 PROCEDURE — 88305 TISSUE EXAM BY PATHOLOGIST: CPT | Performed by: DERMATOLOGY

## 2024-04-29 PROCEDURE — 99213 OFFICE O/P EST LOW 20 MIN: CPT | Performed by: FAMILY MEDICINE

## 2024-04-29 PROCEDURE — 1036F TOBACCO NON-USER: CPT | Performed by: FAMILY MEDICINE

## 2024-04-29 PROCEDURE — 11102 TANGNTL BX SKIN SINGLE LES: CPT | Performed by: FAMILY MEDICINE

## 2024-04-29 ASSESSMENT — PATIENT HEALTH QUESTIONNAIRE - PHQ9
1. LITTLE INTEREST OR PLEASURE IN DOING THINGS: NOT AT ALL
SUM OF ALL RESPONSES TO PHQ9 QUESTIONS 1 AND 2: 0
2. FEELING DOWN, DEPRESSED OR HOPELESS: NOT AT ALL

## 2024-04-29 NOTE — ASSESSMENT & PLAN NOTE
Risks, benefits, and options of shave biopsy(ies) of lesion(s) discussed in detail    1% lidocaine with epinephrine into lesion(s) after cleansing in sterile fashion; small sterilized razor used for shave biopsy; minimal bleeding with Drysol cautery; tolerated well without complications; bacitracin ointment along with bandage was applied    Verbal instructions on wound care given; will send off lesion(s) for pathology and notify of results once available

## 2024-04-29 NOTE — PROGRESS NOTES
Subjective   Patient ID: Archana Mullen is a 34 y.o. female who presents for Suspicious Skin Lesion.    Past Medical, Surgical, and Family History reviewed and updated in chart.    Reviewed all medications by prescribing practitioner or clinical pharmacist (such as prescriptions, OTCs, herbal therapies and supplements) and documented in the medical record.    PRASHANTH Magaña, currently pregnant and due on September 26 at [facility], has presented with a bothersome lesion on her left abdominal area. She has a history of at least two lesions which were removed in the past due to cancerous changes.     The current lesion is causing discomfort as it rubs against her clothing, causing near constant irritation. Archana has expressed interest in having this lesion removed to alleviate the discomfort.    Review of Systems  All pertinent positive symptoms are included in the history of present illness.    All other systems have been reviewed and are negative and noncontributory to this patient's current ailments.    Past Medical History:   Diagnosis Date    12 weeks gestation of pregnancy ([facility]) 03/11/2024    Anxiety disorder, unspecified 11/16/2022    Anxiety    Chronic migraine without aura, intractable, without status migrainosus 11/16/2022    Intractable chronic migraine without aura and without status migrainosus    Convulsion, non-epileptic (Multi) 2018    evaluated at     Crohn's disease of small intestine without complications (Multi) 08/18/2022    Crohn's disease of ileum without complication    Other urticaria 11/11/2021    Chronic urticaria    Papillomavirus as the cause of diseases classified elsewhere     HPV in female    Personal history of other diseases of the female genital tract     History of abnormal cervical Papanicolaou smear     Past Surgical History:   Procedure Laterality Date    APPENDECTOMY  04/20/2020    COLONOSCOPY      ESOPHAGOGASTRODUODENOSCOPY      OVARIAN CYST REMOVAL Right 08/18/2009     "TONSILLECTOMY       Social History     Tobacco Use    Smoking status: Former     Types: Cigarettes     Passive exposure: Past    Smokeless tobacco: Never   Vaping Use    Vaping status: Never Used   Substance Use Topics    Alcohol use: Not Currently    Drug use: Never     Family History   Problem Relation Name Age of Onset    Stroke Maternal Grandmother      Lung cancer Maternal Grandmother      Colon cancer Maternal Grandfather      Stroke Paternal Grandmother      Inflammatory bowel disease Cousin Maternal      Immunization History   Administered Date(s) Administered    Flu vaccine (IIV4), preservative free *Check age/dose* 11/19/2019, 09/13/2020, 11/15/2021, 11/16/2022    Influenza, Unspecified 12/01/2017, 10/18/2018, 12/10/2018    MMR vaccine, subcutaneous (MMR II) 04/24/2001    Moderna SARS-CoV-2 Vaccination 05/07/2021, 06/08/2021, 12/30/2021    Pneumococcal conjugate vaccine, 13-valent (PREVNAR 13) 10/27/2017    Td (adult), unspecified 04/24/2001    Tdap vaccine, age 7 year and older (BOOSTRIX, ADACEL) 09/26/2015     Current Outpatient Medications   Medication Instructions    escitalopram (LEXAPRO) 10 mg, oral, Daily    prenatal no115/iron/folic acid (PRENATAL 19 ORAL) 1 capsule, oral, Daily    vedolizumab (ENTYVIO) 300 mg, intravenous, Once, For Maintenance: every 8 weeks     Allergies   Allergen Reactions    Bupropion Swelling and Rash    Penicillin V Rash     Penicillin V Potassium TABS       Objective   Vitals:    04/29/24 1300   BP: 126/80   Pulse: 62   Weight: 90.7 kg (200 lb)   Height: 1.626 m (5' 4\")     Body mass index is 34.33 kg/m².    BP Readings from Last 3 Encounters:   04/29/24 126/80   04/12/24 112/70   03/12/24 98/64      Wt Readings from Last 3 Encounters:   04/29/24 90.7 kg (200 lb)   04/12/24 89.8 kg (198 lb)   03/12/24 88.9 kg (196 lb)      Physical Exam  CONSTITUTIONAL - well nourished, well developed, looks like stated age, in no acute distress, not ill-appearing, and not tired " appearing  SKIN - normal skin color and pigmentation, normal skin turgor without rash, or nodules visualized; left flank, mid axillary line, is a light brown, oblong, 4 mm x 2 mm slightly raised lesion  HEAD - no trauma, normocephalic  EYES - pupils are equal and reactive to light, extraocular muscles are intact, and normal external exam  ABDOMEN -   EXTREMITIES - no obvious or evident edema, no obvious or evident deformities  NEUROLOGICAL - normal gait, normal balance, normal motor, no ataxia, alert, oriented and no focal signs  PSYCHIATRIC - alert, pleasant and cordial, age-appropriate    Shaving Epidermal/Dermal    Date/Time: 2024 2:02 PM    Performed by: Aftab Swann DO  Authorized by: Aftab Swann DO    Number of Lesions: 1  Lesion 1:     Body area: trunk    Trunk location: abdomen    Malignancy: malignancy unknown      Destruction method: curettage    Assessment/Plan   Problem List Items Addressed This Visit       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

## 2024-05-06 ENCOUNTER — ROUTINE PRENATAL (OUTPATIENT)
Dept: OBSTETRICS AND GYNECOLOGY | Facility: CLINIC | Age: 34
End: 2024-05-06
Payer: COMMERCIAL

## 2024-05-06 VITALS — DIASTOLIC BLOOD PRESSURE: 68 MMHG | SYSTOLIC BLOOD PRESSURE: 116 MMHG | WEIGHT: 198 LBS | BODY MASS INDEX: 33.99 KG/M2

## 2024-05-06 DIAGNOSIS — Z34.82 MULTIGRAVIDA IN SECOND TRIMESTER (HHS-HCC): ICD-10-CM

## 2024-05-06 LAB
LABORATORY COMMENT REPORT: NORMAL
PATH REPORT.FINAL DX SPEC: NORMAL
PATH REPORT.GROSS SPEC: NORMAL
PATH REPORT.MICROSCOPIC SPEC OTHER STN: NORMAL
PATH REPORT.RELEVANT HX SPEC: NORMAL
PATH REPORT.TOTAL CANCER: NORMAL

## 2024-05-06 PROCEDURE — 0501F PRENATAL FLOW SHEET: CPT | Performed by: OBSTETRICS & GYNECOLOGY

## 2024-05-10 ENCOUNTER — ROUTINE PRENATAL (OUTPATIENT)
Dept: OBSTETRICS AND GYNECOLOGY | Facility: CLINIC | Age: 34
End: 2024-05-10
Payer: COMMERCIAL

## 2024-05-10 ENCOUNTER — HOSPITAL ENCOUNTER (OUTPATIENT)
Dept: RADIOLOGY | Facility: CLINIC | Age: 34
Discharge: HOME | End: 2024-05-10
Payer: COMMERCIAL

## 2024-05-10 VITALS — SYSTOLIC BLOOD PRESSURE: 112 MMHG | WEIGHT: 199 LBS | DIASTOLIC BLOOD PRESSURE: 70 MMHG | BODY MASS INDEX: 34.16 KG/M2

## 2024-05-10 DIAGNOSIS — Z3A.20 20 WEEKS GESTATION OF PREGNANCY (HHS-HCC): ICD-10-CM

## 2024-05-10 DIAGNOSIS — Z34.82 MULTIGRAVIDA IN SECOND TRIMESTER (HHS-HCC): Primary | ICD-10-CM

## 2024-05-10 PROCEDURE — 0501F PRENATAL FLOW SHEET: CPT | Performed by: OBSTETRICS & GYNECOLOGY

## 2024-05-10 PROCEDURE — 76811 OB US DETAILED SNGL FETUS: CPT | Performed by: OBSTETRICS & GYNECOLOGY

## 2024-05-13 ENCOUNTER — OFFICE VISIT (OUTPATIENT)
Dept: GASTROENTEROLOGY | Facility: CLINIC | Age: 34
End: 2024-05-13
Payer: COMMERCIAL

## 2024-05-13 ENCOUNTER — LAB (OUTPATIENT)
Dept: LAB | Facility: LAB | Age: 34
End: 2024-05-13
Payer: COMMERCIAL

## 2024-05-13 VITALS — HEART RATE: 75 BPM | WEIGHT: 198 LBS | BODY MASS INDEX: 33.8 KG/M2 | HEIGHT: 64 IN

## 2024-05-13 DIAGNOSIS — K50.00 CROHN'S DISEASE OF ILEUM WITHOUT COMPLICATION (MULTI): ICD-10-CM

## 2024-05-13 DIAGNOSIS — K50.00 CROHN'S DISEASE OF ILEUM WITHOUT COMPLICATION (MULTI): Primary | ICD-10-CM

## 2024-05-13 LAB
ALBUMIN SERPL BCP-MCNC: 3.5 G/DL (ref 3.4–5)
ALP SERPL-CCNC: 90 U/L (ref 33–110)
ALT SERPL W P-5'-P-CCNC: 10 U/L (ref 7–45)
ANION GAP SERPL CALC-SCNC: 16 MMOL/L (ref 10–20)
AST SERPL W P-5'-P-CCNC: 14 U/L (ref 9–39)
BASOPHILS # BLD AUTO: 0.03 X10*3/UL (ref 0–0.1)
BASOPHILS NFR BLD AUTO: 0.3 %
BILIRUB SERPL-MCNC: 0.2 MG/DL (ref 0–1.2)
BUN SERPL-MCNC: 8 MG/DL (ref 6–23)
CALCIUM SERPL-MCNC: 8.9 MG/DL (ref 8.6–10.6)
CHLORIDE SERPL-SCNC: 103 MMOL/L (ref 98–107)
CO2 SERPL-SCNC: 24 MMOL/L (ref 21–32)
CREAT SERPL-MCNC: 0.52 MG/DL (ref 0.5–1.05)
CRP SERPL-MCNC: 2.86 MG/DL
EGFRCR SERPLBLD CKD-EPI 2021: >90 ML/MIN/1.73M*2
EOSINOPHIL # BLD AUTO: 0.18 X10*3/UL (ref 0–0.7)
EOSINOPHIL NFR BLD AUTO: 2 %
ERYTHROCYTE [DISTWIDTH] IN BLOOD BY AUTOMATED COUNT: 13.2 % (ref 11.5–14.5)
GLUCOSE SERPL-MCNC: 121 MG/DL (ref 74–99)
HCT VFR BLD AUTO: 35.2 % (ref 36–46)
HGB BLD-MCNC: 11.3 G/DL (ref 12–16)
IMM GRANULOCYTES # BLD AUTO: 0.03 X10*3/UL (ref 0–0.7)
IMM GRANULOCYTES NFR BLD AUTO: 0.3 % (ref 0–0.9)
LYMPHOCYTES # BLD AUTO: 1.84 X10*3/UL (ref 1.2–4.8)
LYMPHOCYTES NFR BLD AUTO: 20.6 %
MCH RBC QN AUTO: 30.2 PG (ref 26–34)
MCHC RBC AUTO-ENTMCNC: 32.1 G/DL (ref 32–36)
MCV RBC AUTO: 94 FL (ref 80–100)
MONOCYTES # BLD AUTO: 0.6 X10*3/UL (ref 0.1–1)
MONOCYTES NFR BLD AUTO: 6.7 %
NEUTROPHILS # BLD AUTO: 6.27 X10*3/UL (ref 1.2–7.7)
NEUTROPHILS NFR BLD AUTO: 70.1 %
NRBC BLD-RTO: 0 /100 WBCS (ref 0–0)
PLATELET # BLD AUTO: 224 X10*3/UL (ref 150–450)
POTASSIUM SERPL-SCNC: 3.9 MMOL/L (ref 3.5–5.3)
PROT SERPL-MCNC: 6.3 G/DL (ref 6.4–8.2)
RBC # BLD AUTO: 3.74 X10*6/UL (ref 4–5.2)
SODIUM SERPL-SCNC: 139 MMOL/L (ref 136–145)
WBC # BLD AUTO: 9 X10*3/UL (ref 4.4–11.3)

## 2024-05-13 PROCEDURE — 86481 TB AG RESPONSE T-CELL SUSP: CPT

## 2024-05-13 PROCEDURE — 85025 COMPLETE CBC W/AUTO DIFF WBC: CPT

## 2024-05-13 PROCEDURE — 86140 C-REACTIVE PROTEIN: CPT

## 2024-05-13 PROCEDURE — 80053 COMPREHEN METABOLIC PANEL: CPT

## 2024-05-13 PROCEDURE — 99215 OFFICE O/P EST HI 40 MIN: CPT | Performed by: INTERNAL MEDICINE

## 2024-05-13 PROCEDURE — 36415 COLL VENOUS BLD VENIPUNCTURE: CPT

## 2024-05-13 ASSESSMENT — ENCOUNTER SYMPTOMS: SHORTNESS OF BREATH: 0

## 2024-05-13 NOTE — PATIENT INSTRUCTIONS
Update labs including TB test. Ok to continue with Entyvio through pregnancy with plan to hold it at 36 weeks and then restart once cleared by Ob/Gyn after delivery. Entyvio can be detected in breastmilk but it is generally considered safe to breastfeed on Entyvio. Follow-up in the office in 6 months after delivery to ensure you are doing well postpartum.

## 2024-05-13 NOTE — PROGRESS NOTES
REASON FOR VISIT:  Crohn's disease  PCP (requesting provider): Aftab Swann DO.    HPI:  Archana Mullen is a 34 y.o. female with a past medical history of ADHD, anxiety, depression, and chronic urticaria following for ileal Crohn's disease (diagnosed 4/2017) in deep remission on Entyvio (started 12/2019). Failed Mesalamine, intolerant of Budesonide, and had loss of efficacy with Humira. Colonoscopy showed hemorrhoids (12/2021). Fecal calprotectin 78 (8/2022). She is currently pregnant.    The patient is currently 21 weeks pregnant. The patient reports that she is on Entyvio and this was ok with Ob/Gyn and Fertility. She is currently doing infusions every 8 weeks. She is feeling well overall. She has regular and daily Bms. No blood in the stool. No abdominal pain. Everything going well with pregnancy. She will need updated TB test. She is doing infusions every 8 weeks. No regular NSAIDs other than 162 mg of ASA. She is 1 week overdue for next infusion of Entyvio due to insurance issues.    PSurgHx:  -Tonsillectomy   -Appendectomy      FamHx: MGF with colon cancer. Two maternal cousins with Crohn's disease.     Prior Endoscopy:  -Colonoscopy (12/2021): Fair prep, normal TI (normal TI biopsies), small EH, otherwise normal colon (normal right and left colonic biopsies).  -Colonoscopy (12/2018): Excellent prep, normal TI (normal TI biopsies), normal colon (normal segmental colonic biopsies).  -EGD (4/2017): Normal esophagus, mild gastritis (H. pylori -), normal duodenum (normal duodenal biopsies).  -Colonoscopy (4/2017): Excellent prep, localized area of moderately erythematous and ulcerated and congested mucosa (TI biopsies showed chronic active ileitis), normal colon (normal segmental colonic biopsies).     PAST MEDICAL HISTORY  Past Medical History:   Diagnosis Date    12 weeks gestation of pregnancy ([facility]) 03/11/2024    Anxiety disorder, unspecified 11/16/2022    Anxiety    Chronic migraine without aura,  intractable, without status migrainosus 11/16/2022    Intractable chronic migraine without aura and without status migrainosus    Convulsion, non-epileptic (Multi) 2018    evaluated at     Crohn's disease of small intestine without complications (Multi) 08/18/2022    Crohn's disease of ileum without complication    Other urticaria 11/11/2021    Chronic urticaria    Papillomavirus as the cause of diseases classified elsewhere     HPV in female    Personal history of other diseases of the female genital tract     History of abnormal cervical Papanicolaou smear       PAST SURGICAL HISTORY  Past Surgical History:   Procedure Laterality Date    APPENDECTOMY  04/20/2020    COLONOSCOPY      ESOPHAGOGASTRODUODENOSCOPY      OVARIAN CYST REMOVAL Right 08/18/2009    TONSILLECTOMY         FAMILY HISTORY  Family History   Problem Relation Name Age of Onset    Stroke Maternal Grandmother      Lung cancer Maternal Grandmother      Colon cancer Maternal Grandfather      Stroke Paternal Grandmother      Inflammatory bowel disease Cousin Maternal        SOCIAL HISTORY   reports that she has quit smoking. Her smoking use included cigarettes. She has been exposed to tobacco smoke. She has never used smokeless tobacco. She reports that she does not currently use alcohol. She reports that she does not use drugs.    REVIEW OF SYSTEMS  Review of Systems   Respiratory:  Negative for shortness of breath.    Cardiovascular:  Negative for chest pain.   All other systems reviewed and are negative.    A 10+ point review of systems was otherwise negative except as noted and per HPI.    ALLERGIES  Allergies   Allergen Reactions    Bupropion Swelling and Rash    Penicillin V Rash     Penicillin V Potassium TABS       MEDICATIONS  Current Outpatient Medications   Medication Instructions    escitalopram (LEXAPRO) 10 mg, oral, Daily    prenatal no115/iron/folic acid (PRENATAL 19 ORAL) 1 capsule, oral, Daily    vedolizumab (ENTYVIO) 300 mg,  intravenous, Once, For Maintenance: every 8 weeks       VITALS  There were no vitals filed for this visit.   There is no height or weight on file to calculate BMI.    PHYSICAL EXAM  CONSTITUTIONAL: NAD, appears stated age  EYES: anicteric sclera, sclera clear  HEAD: normocephalic, atraumatic   NECK: supple   PULMONARY: CTAB  CARDIOVASCULAR: RRR, no M/R/G appreciated   ABDOMEN: soft, NTND, +BS, no rebound or guarding   MUSCULOSKELETAL: no edema  SKIN: no jaundice   PSYCHIATRIC: AOx3, appropriate insight and judgement    LABS  WBC   Date Value   02/13/2024 9.1 x10*3/uL   11/13/2023 9.4 x10*3/uL   11/11/2021 8.2 x10E9/L   03/17/2021 7.6 x10E9/L   12/10/2018 6.9 x10E9/L     Hemoglobin (g/dL)   Date Value   02/13/2024 12.7   11/13/2023 12.3   11/11/2021 12.5   03/17/2021 13.0   12/10/2018 13.7     Platelets   Date Value   02/13/2024 266 x10*3/uL   11/13/2023 231 x10*3/uL   11/11/2021 265 x10E9/L   03/17/2021 276 x10E9/L   12/10/2018 182 x10E9/L     Sodium (mmol/L)   Date Value   11/13/2023 137   11/11/2021 141   03/17/2021 139   11/12/2018 136     Potassium (mmol/L)   Date Value   11/13/2023 4.5   11/11/2021 4.4   03/17/2021 3.7   11/12/2018 3.8     Chloride (mmol/L)   Date Value   11/13/2023 104   11/11/2021 105   03/17/2021 108 (H)   11/12/2018 112 (H)     Bicarbonate (mmol/L)   Date Value   11/13/2023 25   11/11/2021 25   03/17/2021 24   11/12/2018 21     Urea Nitrogen (mg/dL)   Date Value   11/13/2023 8   11/11/2021 11   03/17/2021 14   11/12/2018 10     Creatinine (mg/dL)   Date Value   11/13/2023 0.78   11/11/2021 0.75   03/17/2021 0.73   11/12/2018 0.81     Calcium (mg/dL)   Date Value   11/13/2023 8.9   11/11/2021 9.2   03/17/2021 9.3   11/12/2018 8.9     Total Protein (g/dL)   Date Value   11/13/2023 6.7   11/11/2021 7.3   03/17/2021 7.4   11/05/2018 6.8     Bilirubin, Total (mg/dL)   Date Value   11/13/2023 0.3     Total Bilirubin (mg/dL)   Date Value   11/11/2021 0.3   03/17/2021 0.2   11/05/2018 0.2      Alkaline Phosphatase (U/L)   Date Value   11/13/2023 52   11/11/2021 80   03/17/2021 75   11/05/2018 46     ALT (U/L)   Date Value   11/13/2023 10     ALT (SGPT) (U/L)   Date Value   11/11/2021 14   03/17/2021 17   11/05/2018 12     AST (U/L)   Date Value   11/13/2023 19   11/11/2021 24   03/17/2021 22   11/05/2018 18     Glucose (mg/dL)   Date Value   11/13/2023 81   11/11/2021 106 (H)   03/17/2021 92   11/12/2018 87     Lipase (U/L)   Date Value   11/05/2018 35     CRP (mg/dL)   Date Value   11/11/2021 0.72   11/12/2018 1.57 (A)   11/05/2018 1.19 (A)       ASSESSMENT/PLAN  Archanatierra Mullen is a 34 y.o. female with a past medical history of ADHD, anxiety, depression, and chronic urticaria following for ileal Crohn's disease (diagnosed 4/2017) in deep remission on Entyvio (started 12/2019). Failed Mesalamine, intolerant of Budesonide, and had loss of efficacy with Humira. Colonoscopy showed hemorrhoids (12/2021). Fecal calprotectin 78 (8/2022). She is currently 21 weeks pregnant. She is doing very well on Entyvio. We will plan to continue this through pregnancy until 36 weeks and then hold it at this time and restart after delivery once cleared by Ob/Gyn. She needs updated blood work.    -Continue Entyvio 300 mg infusions every 8 weeks up until 36 weeks pregnant and then would hold infusions until after delivery and cleared by Ob/Gyn  -Update labs (CBC, CMP, Tspot, CRP)  -Advised patient that Entyvio is detected in breast milk but generally considered safe to breastfeed while on Entyvio   -She will also discuss this with her Ob/Gyn and if there are any questions or concerns then I am available for further coordination of care    Follow-up in the office in 6 months.    Signature: Angel Kirkland MD

## 2024-05-15 LAB
NIL(NEG) CONTROL SPOT COUNT: NORMAL
PANEL A SPOT COUNT: 0
PANEL B SPOT COUNT: 0
POS CONTROL SPOT COUNT: NORMAL
T-SPOT. TB INTERPRETATION: NEGATIVE

## 2024-05-24 ENCOUNTER — ROUTINE PRENATAL (OUTPATIENT)
Dept: OBSTETRICS AND GYNECOLOGY | Facility: CLINIC | Age: 34
End: 2024-05-24
Payer: COMMERCIAL

## 2024-05-24 VITALS — DIASTOLIC BLOOD PRESSURE: 80 MMHG | BODY MASS INDEX: 34.67 KG/M2 | WEIGHT: 202 LBS | SYSTOLIC BLOOD PRESSURE: 122 MMHG

## 2024-05-24 DIAGNOSIS — Z34.82 MULTIGRAVIDA IN SECOND TRIMESTER (HHS-HCC): ICD-10-CM

## 2024-05-24 PROCEDURE — 0501F PRENATAL FLOW SHEET: CPT | Performed by: OBSTETRICS & GYNECOLOGY

## 2024-05-24 NOTE — PROGRESS NOTES
C/o swelling in her ankles.  Urine protein negative.  1+ edema B/L.  BP nl.    Disc s/sx of PEC  A/p - gest edema - no evidence of pec

## 2024-06-07 ENCOUNTER — HOSPITAL ENCOUNTER (OUTPATIENT)
Dept: RADIOLOGY | Facility: CLINIC | Age: 34
Discharge: HOME | End: 2024-06-07
Payer: COMMERCIAL

## 2024-06-07 ENCOUNTER — ROUTINE PRENATAL (OUTPATIENT)
Dept: OBSTETRICS AND GYNECOLOGY | Facility: CLINIC | Age: 34
End: 2024-06-07
Payer: COMMERCIAL

## 2024-06-07 VITALS — DIASTOLIC BLOOD PRESSURE: 60 MMHG | SYSTOLIC BLOOD PRESSURE: 100 MMHG | BODY MASS INDEX: 35.53 KG/M2 | WEIGHT: 207 LBS

## 2024-06-07 DIAGNOSIS — Z34.82 MULTIGRAVIDA IN SECOND TRIMESTER (HHS-HCC): ICD-10-CM

## 2024-06-07 PROCEDURE — 0501F PRENATAL FLOW SHEET: CPT | Performed by: OBSTETRICS & GYNECOLOGY

## 2024-07-08 ENCOUNTER — APPOINTMENT (OUTPATIENT)
Dept: OBSTETRICS AND GYNECOLOGY | Facility: CLINIC | Age: 34
End: 2024-07-08
Payer: COMMERCIAL

## 2024-07-09 ENCOUNTER — APPOINTMENT (OUTPATIENT)
Dept: OBSTETRICS AND GYNECOLOGY | Facility: CLINIC | Age: 34
End: 2024-07-09
Payer: COMMERCIAL

## 2024-07-09 VITALS — BODY MASS INDEX: 36.22 KG/M2 | SYSTOLIC BLOOD PRESSURE: 108 MMHG | WEIGHT: 211 LBS | DIASTOLIC BLOOD PRESSURE: 70 MMHG

## 2024-07-09 DIAGNOSIS — Z3A.28 28 WEEKS GESTATION OF PREGNANCY (HHS-HCC): ICD-10-CM

## 2024-07-09 DIAGNOSIS — Z34.83 ENCOUNTER FOR SUPERVISION OF NORMAL PREGNANCY IN MULTIGRAVIDA IN THIRD TRIMESTER (HHS-HCC): ICD-10-CM

## 2024-07-09 LAB
ERYTHROCYTE [DISTWIDTH] IN BLOOD BY AUTOMATED COUNT: 14.4 % (ref 11.5–14.5)
GLUCOSE 1H P 50 G GLC PO SERPL-MCNC: 146 MG/DL
HCT VFR BLD AUTO: 34.7 % (ref 36–46)
HGB BLD-MCNC: 10.9 G/DL (ref 12–16)
MCH RBC QN AUTO: 31.5 PG (ref 26–34)
MCHC RBC AUTO-ENTMCNC: 31.4 G/DL (ref 32–36)
MCV RBC AUTO: 100 FL (ref 80–100)
NRBC BLD-RTO: 0 /100 WBCS (ref 0–0)
PLATELET # BLD AUTO: 217 X10*3/UL (ref 150–450)
RBC # BLD AUTO: 3.46 X10*6/UL (ref 4–5.2)
TREPONEMA PALLIDUM IGG+IGM AB [PRESENCE] IN SERUM OR PLASMA BY IMMUNOASSAY: NONREACTIVE
WBC # BLD AUTO: 11.2 X10*3/UL (ref 4.4–11.3)

## 2024-07-09 PROCEDURE — 82947 ASSAY GLUCOSE BLOOD QUANT: CPT

## 2024-07-09 PROCEDURE — 86780 TREPONEMA PALLIDUM: CPT

## 2024-07-09 PROCEDURE — 36415 COLL VENOUS BLD VENIPUNCTURE: CPT

## 2024-07-09 PROCEDURE — 85027 COMPLETE CBC AUTOMATED: CPT

## 2024-07-09 PROCEDURE — 0501F PRENATAL FLOW SHEET: CPT | Performed by: OBSTETRICS & GYNECOLOGY

## 2024-07-10 DIAGNOSIS — Z3A.28 28 WEEKS GESTATION OF PREGNANCY (HHS-HCC): Primary | ICD-10-CM

## 2024-07-12 ENCOUNTER — LAB (OUTPATIENT)
Dept: LAB | Facility: LAB | Age: 34
End: 2024-07-12
Payer: COMMERCIAL

## 2024-07-12 DIAGNOSIS — Z3A.28 28 WEEKS GESTATION OF PREGNANCY (HHS-HCC): ICD-10-CM

## 2024-07-12 LAB
GLUCOSE 1H P 100 G GLC PO SERPL-MCNC: 191 MG/DL
GLUCOSE 2H P 100 G GLC PO SERPL-MCNC: 184 MG/DL
GLUCOSE 3H P 100 G GLC PO SERPL-MCNC: 128 MG/DL
GLUCOSE P FAST SERPL-MCNC: 84 MG/DL

## 2024-07-12 PROCEDURE — 82947 ASSAY GLUCOSE BLOOD QUANT: CPT

## 2024-07-12 PROCEDURE — 82952 GTT-ADDED SAMPLES: CPT

## 2024-07-12 PROCEDURE — 36415 COLL VENOUS BLD VENIPUNCTURE: CPT

## 2024-07-12 PROCEDURE — 82950 GLUCOSE TEST: CPT

## 2024-07-12 PROCEDURE — 82951 GLUCOSE TOLERANCE TEST (GTT): CPT

## 2024-07-14 ENCOUNTER — HOSPITAL ENCOUNTER (OUTPATIENT)
Facility: HOSPITAL | Age: 34
End: 2024-07-14
Attending: OBSTETRICS & GYNECOLOGY | Admitting: OBSTETRICS & GYNECOLOGY
Payer: COMMERCIAL

## 2024-07-14 ENCOUNTER — HOSPITAL ENCOUNTER (OUTPATIENT)
Facility: HOSPITAL | Age: 34
Discharge: HOME | End: 2024-07-14
Attending: OBSTETRICS & GYNECOLOGY | Admitting: OBSTETRICS & GYNECOLOGY
Payer: COMMERCIAL

## 2024-07-14 ENCOUNTER — APPOINTMENT (OUTPATIENT)
Dept: CARDIOLOGY | Facility: HOSPITAL | Age: 34
End: 2024-07-14
Payer: COMMERCIAL

## 2024-07-14 VITALS
BODY MASS INDEX: 35.91 KG/M2 | HEART RATE: 90 BPM | DIASTOLIC BLOOD PRESSURE: 64 MMHG | TEMPERATURE: 97.7 F | RESPIRATION RATE: 18 BRPM | HEIGHT: 64 IN | WEIGHT: 210.32 LBS | SYSTOLIC BLOOD PRESSURE: 113 MMHG | OXYGEN SATURATION: 98 %

## 2024-07-14 DIAGNOSIS — O24.410 DIET CONTROLLED GESTATIONAL DIABETES MELLITUS (GDM), ANTEPARTUM (HHS-HCC): Primary | ICD-10-CM

## 2024-07-14 PROBLEM — D48.5 NEOPLASM OF UNCERTAIN BEHAVIOR OF SKIN OF ABDOMEN: Status: RESOLVED | Noted: 2024-04-29 | Resolved: 2024-07-14

## 2024-07-14 PROBLEM — O09.819 PREGNANCY RESULTING FROM ASSISTED REPRODUCTIVE TECHNOLOGY, ANTEPARTUM (HHS-HCC): Status: ACTIVE | Noted: 2024-07-14

## 2024-07-14 PROBLEM — O09.519 ADVANCED MATERNAL AGE, PRIMIGRAVIDA, ANTEPARTUM (HHS-HCC): Status: ACTIVE | Noted: 2024-07-14

## 2024-07-14 PROBLEM — O09.529 ANTEPARTUM MULTIGRAVIDA OF ADVANCED MATERNAL AGE (HHS-HCC): Status: ACTIVE | Noted: 2024-07-14

## 2024-07-14 PROBLEM — O99.619 MATERNAL CROHN'S DISEASE AFFECTING PREGNANCY, ANTEPARTUM (MULTI): Status: ACTIVE | Noted: 2023-08-23

## 2024-07-14 PROBLEM — O24.419 GESTATIONAL DIABETES MELLITUS (GDM), ANTEPARTUM (HHS-HCC): Status: ACTIVE | Noted: 2024-07-14

## 2024-07-14 PROBLEM — K50.90 MATERNAL CROHN'S DISEASE AFFECTING PREGNANCY, ANTEPARTUM (MULTI): Status: ACTIVE | Noted: 2023-08-23

## 2024-07-14 PROBLEM — N97.9 INFERTILITY, FEMALE: Status: RESOLVED | Noted: 2023-10-19 | Resolved: 2024-07-14

## 2024-07-14 LAB
ANION GAP SERPL CALC-SCNC: 14 MMOL/L (ref 10–20)
B-OH-BUTYR SERPL-SCNC: 0.16 MMOL/L (ref 0.02–0.27)
BUN SERPL-MCNC: 5 MG/DL (ref 6–23)
CALCIUM SERPL-MCNC: 7.8 MG/DL (ref 8.6–10.3)
CHLORIDE SERPL-SCNC: 108 MMOL/L (ref 98–107)
CO2 SERPL-SCNC: 19 MMOL/L (ref 21–32)
CREAT SERPL-MCNC: 0.43 MG/DL (ref 0.5–1.05)
EGFRCR SERPLBLD CKD-EPI 2021: >90 ML/MIN/1.73M*2
GLUCOSE BLD MANUAL STRIP-MCNC: 88 MG/DL (ref 74–99)
GLUCOSE SERPL-MCNC: 83 MG/DL (ref 74–99)
POTASSIUM SERPL-SCNC: 4 MMOL/L (ref 3.5–5.3)
SODIUM SERPL-SCNC: 137 MMOL/L (ref 136–145)

## 2024-07-14 PROCEDURE — 59020 FETAL CONTRACT STRESS TEST: CPT

## 2024-07-14 PROCEDURE — 2500000001 HC RX 250 WO HCPCS SELF ADMINISTERED DRUGS (ALT 637 FOR MEDICARE OP): Performed by: OBSTETRICS & GYNECOLOGY

## 2024-07-14 PROCEDURE — 93005 ELECTROCARDIOGRAM TRACING: CPT

## 2024-07-14 PROCEDURE — 36415 COLL VENOUS BLD VENIPUNCTURE: CPT | Performed by: OBSTETRICS & GYNECOLOGY

## 2024-07-14 PROCEDURE — 36415 COLL VENOUS BLD VENIPUNCTURE: CPT

## 2024-07-14 PROCEDURE — 82947 ASSAY GLUCOSE BLOOD QUANT: CPT | Mod: 59

## 2024-07-14 PROCEDURE — 80048 BASIC METABOLIC PNL TOTAL CA: CPT | Performed by: OBSTETRICS & GYNECOLOGY

## 2024-07-14 PROCEDURE — 59025 FETAL NON-STRESS TEST: CPT | Performed by: OBSTETRICS & GYNECOLOGY

## 2024-07-14 PROCEDURE — 99214 OFFICE O/P EST MOD 30 MIN: CPT | Mod: 25

## 2024-07-14 PROCEDURE — 99213 OFFICE O/P EST LOW 20 MIN: CPT | Performed by: OBSTETRICS & GYNECOLOGY

## 2024-07-14 PROCEDURE — 82010 KETONE BODYS QUAN: CPT | Mod: AHULAB | Performed by: OBSTETRICS & GYNECOLOGY

## 2024-07-14 RX ORDER — NIFEDIPINE 10 MG/1
10 CAPSULE ORAL ONCE AS NEEDED
Status: DISCONTINUED | OUTPATIENT
Start: 2024-07-14 | End: 2024-07-14 | Stop reason: HOSPADM

## 2024-07-14 RX ORDER — ONDANSETRON 4 MG/1
4 TABLET, FILM COATED ORAL EVERY 6 HOURS PRN
Status: DISCONTINUED | OUTPATIENT
Start: 2024-07-14 | End: 2024-07-14 | Stop reason: HOSPADM

## 2024-07-14 RX ORDER — LANCETS 26 GAUGE
EACH MISCELLANEOUS
Qty: 120 EACH | Refills: 11 | Status: SHIPPED | OUTPATIENT
Start: 2024-07-14

## 2024-07-14 RX ORDER — DEXTROSE 4 G
1 TABLET,CHEWABLE ORAL 4 TIMES DAILY
Qty: 1 EACH | Refills: 0 | Status: SHIPPED | OUTPATIENT
Start: 2024-07-14 | End: 2025-07-14

## 2024-07-14 RX ORDER — ONDANSETRON HYDROCHLORIDE 2 MG/ML
4 INJECTION, SOLUTION INTRAVENOUS EVERY 6 HOURS PRN
Status: DISCONTINUED | OUTPATIENT
Start: 2024-07-14 | End: 2024-07-14 | Stop reason: HOSPADM

## 2024-07-14 RX ORDER — HYDRALAZINE HYDROCHLORIDE 20 MG/ML
5 INJECTION INTRAMUSCULAR; INTRAVENOUS ONCE AS NEEDED
Status: DISCONTINUED | OUTPATIENT
Start: 2024-07-14 | End: 2024-07-14 | Stop reason: HOSPADM

## 2024-07-14 RX ORDER — LIDOCAINE HYDROCHLORIDE 10 MG/ML
0.5 INJECTION INFILTRATION; PERINEURAL ONCE AS NEEDED
Status: DISCONTINUED | OUTPATIENT
Start: 2024-07-14 | End: 2024-07-14 | Stop reason: HOSPADM

## 2024-07-14 RX ORDER — MECLIZINE HYDROCHLORIDE 25 MG/1
25 TABLET ORAL 3 TIMES DAILY PRN
Status: DISCONTINUED | OUTPATIENT
Start: 2024-07-14 | End: 2024-07-14 | Stop reason: HOSPADM

## 2024-07-14 RX ORDER — MECLIZINE HYDROCHLORIDE 25 MG/1
25 TABLET ORAL 3 TIMES DAILY PRN
Qty: 30 TABLET | Refills: 0 | Status: SHIPPED | OUTPATIENT
Start: 2024-07-14

## 2024-07-14 RX ORDER — LABETALOL HYDROCHLORIDE 5 MG/ML
20 INJECTION, SOLUTION INTRAVENOUS ONCE AS NEEDED
Status: DISCONTINUED | OUTPATIENT
Start: 2024-07-14 | End: 2024-07-14 | Stop reason: HOSPADM

## 2024-07-14 RX ADMIN — MECLIZINE HYDROCHLORIDE 25 MG: 25 TABLET ORAL at 12:50

## 2024-07-14 SDOH — SOCIAL STABILITY: SOCIAL INSECURITY: ARE THERE ANY APPARENT SIGNS OF INJURIES/BEHAVIORS THAT COULD BE RELATED TO ABUSE/NEGLECT?: NO

## 2024-07-14 SDOH — HEALTH STABILITY: MENTAL HEALTH: WERE YOU ABLE TO COMPLETE ALL THE BEHAVIORAL HEALTH SCREENINGS?: YES

## 2024-07-14 SDOH — SOCIAL STABILITY: SOCIAL INSECURITY: DO YOU FEEL ANYONE HAS EXPLOITED OR TAKEN ADVANTAGE OF YOU FINANCIALLY OR OF YOUR PERSONAL PROPERTY?: NO

## 2024-07-14 SDOH — SOCIAL STABILITY: SOCIAL INSECURITY: ARE YOU OR HAVE YOU BEEN THREATENED OR ABUSED PHYSICALLY, EMOTIONALLY, OR SEXUALLY BY ANYONE?: NO

## 2024-07-14 SDOH — HEALTH STABILITY: MENTAL HEALTH: WISH TO BE DEAD (PAST 1 MONTH): NO

## 2024-07-14 SDOH — SOCIAL STABILITY: SOCIAL INSECURITY: DOES ANYONE TRY TO KEEP YOU FROM HAVING/CONTACTING OTHER FRIENDS OR DOING THINGS OUTSIDE YOUR HOME?: NO

## 2024-07-14 SDOH — ECONOMIC STABILITY: HOUSING INSECURITY: DO YOU FEEL UNSAFE GOING BACK TO THE PLACE WHERE YOU ARE LIVING?: NO

## 2024-07-14 SDOH — SOCIAL STABILITY: SOCIAL INSECURITY: HAVE YOU HAD ANY THOUGHTS OF HARMING ANYONE ELSE?: NO

## 2024-07-14 SDOH — SOCIAL STABILITY: SOCIAL INSECURITY: ABUSE SCREEN: ADULT

## 2024-07-14 SDOH — SOCIAL STABILITY: SOCIAL INSECURITY: PHYSICAL ABUSE: DENIES

## 2024-07-14 SDOH — HEALTH STABILITY: MENTAL HEALTH: NON-SPECIFIC ACTIVE SUICIDAL THOUGHTS (PAST 1 MONTH): NO

## 2024-07-14 SDOH — SOCIAL STABILITY: SOCIAL INSECURITY: HAVE YOU HAD THOUGHTS OF HARMING ANYONE ELSE?: NO

## 2024-07-14 SDOH — SOCIAL STABILITY: SOCIAL INSECURITY: VERBAL ABUSE: DENIES

## 2024-07-14 SDOH — HEALTH STABILITY: MENTAL HEALTH: SUICIDAL BEHAVIOR (LIFETIME): NO

## 2024-07-14 SDOH — SOCIAL STABILITY: SOCIAL INSECURITY: HAS ANYONE EVER THREATENED TO HURT YOUR FAMILY OR YOUR PETS?: NO

## 2024-07-14 ASSESSMENT — PAIN SCALES - GENERAL
PAINLEVEL_OUTOF10: 0 - NO PAIN

## 2024-07-14 ASSESSMENT — LIFESTYLE VARIABLES
AUDIT-C TOTAL SCORE: 0
HOW MANY STANDARD DRINKS CONTAINING ALCOHOL DO YOU HAVE ON A TYPICAL DAY: PATIENT DOES NOT DRINK
AUDIT-C TOTAL SCORE: 0
SKIP TO QUESTIONS 9-10: 1
HOW OFTEN DO YOU HAVE A DRINK CONTAINING ALCOHOL: NEVER
HOW OFTEN DO YOU HAVE 6 OR MORE DRINKS ON ONE OCCASION: NEVER

## 2024-07-14 ASSESSMENT — PATIENT HEALTH QUESTIONNAIRE - PHQ9
1. LITTLE INTEREST OR PLEASURE IN DOING THINGS: NOT AT ALL
2. FEELING DOWN, DEPRESSED OR HOPELESS: NOT AT ALL
SUM OF ALL RESPONSES TO PHQ9 QUESTIONS 1 & 2: 0

## 2024-07-14 NOTE — H&P
Obstetrical Admission History and Physical     Archana Mullen is a 34 y.o.  at 29w3d    Chief Complaint: Dizziness    Assessment/Plan    35yo  at 29w3d with dizziness, suspect vertigo  -UA negative for ketones  -Recent CBC showed mild anemia, hgb 10.9  -Blood glucose wnl  -EKG NSR, ?prolonged QT reviewed with anesthesia team  -Declines tylenol for headache and nausea medication    New diagnosis of Gestational diabetes  -Blood glucose 88 in triage, negative ketones on UA  -Problem list updated  -Will need referral to diabetes boot camp, instructed to call office in the morning.  Glucometer and testing supplies sent to pharmacy    Crohn's Disease  -Stable on Entyvio  -Sees Dr. Kirkland    Update:  Archana's dizziness improved with meclizine, she is requesting discharge home.  She is tolerating a regular diet.  We discussed testing blood glucose 4x daily and documenting in log.  All questions were answered.    Active Problems:    Maternal Crohn's disease affecting pregnancy, antepartum (Multi)    Pregnancy resulting from assisted reproductive technology, antepartum (HHS-HCC)    Advanced maternal age, primigravida, antepartum (HHS-HCC)    Gestational diabetes mellitus (GDM), antepartum (HHS-HCC)      Pregnancy #2 Problems (from 23 to present)       Problem Noted Resolved    Pregnancy resulting from assisted reproductive technology, antepartum (HHS-HCC) 2024 by Alberto Walter MD No    Priority:  Medium      Overview Signed 2024 12:26 PM by Alberto Walter MD     US at 30 and 36w  Weekly NSTs at 36w         Antepartum multigravida of advanced maternal age (HHS-HCC) 2024 by Alberto Walter MD No    Priority:  Medium      Gestational diabetes mellitus (GDM), antepartum ([facility]HS-HCC) 2024 by Alberto Walter MD No    Priority:  Medium      Overview Signed 2024 12:29 PM by Alberto Walter MD     [place]            Vida Magaña is a 35yo  at 29w3d here  reporting dizziness since yesterday.  She reports that they put a pool up yesterday and then she went inside to take a nap and noticed the dizziness after awakening.  Describes it as the room spinning.  Mild nausea in the car, but no emesis.  +headache which is not new, she has not taken anything for it.      Obstetrical History   OB History    Para Term  AB Living   2 0 0 0 1 0   SAB IAB Ectopic Multiple Live Births   1 0 0 0 0      # Outcome Date GA Lbr Silver/2nd Weight Sex Type Anes PTL Lv   2 Current            1 SAB 10/2023               Past Medical History  Past Medical History:   Diagnosis Date    12 weeks gestation of pregnancy ([facility]) 2024    Anxiety disorder, unspecified 2022    Anxiety    Chronic migraine without aura, intractable, without status migrainosus 2022    Intractable chronic migraine without aura and without status migrainosus    Convulsion, non-epileptic (Multi)     evaluated at     Crohn's disease of small intestine without complications (Multi) 2022    Crohn's disease of ileum without complication    Other urticaria 2021    Chronic urticaria    Papillomavirus as the cause of diseases classified elsewhere     HPV in female    Personal history of other diseases of the female genital tract     History of abnormal cervical Papanicolaou smear        Past Surgical History   Past Surgical History:   Procedure Laterality Date    APPENDECTOMY  2020    COLONOSCOPY      ESOPHAGOGASTRODUODENOSCOPY      OVARIAN CYST REMOVAL Right 2009    TONSILLECTOMY         Social History  Social History     Tobacco Use    Smoking status: Former     Types: Cigarettes     Passive exposure: Past    Smokeless tobacco: Never   Substance Use Topics    Alcohol use: Not Currently     Substance and Sexual Activity   Drug Use Never       Allergies  Bupropion and Penicillin v     Medications  Medications Prior to Admission   Medication Sig Dispense Refill Last Dose     escitalopram (Lexapro) 10 mg tablet Take 1 tablet (10 mg) by mouth once daily. 90 tablet 1 7/13/2024    prenatal no115/iron/folic acid (PRENATAL 19 ORAL) Take 1 capsule by mouth once daily.   7/13/2024    vedolizumab (Entyvio) 300 mg injection Infuse 300 mg into a venous catheter 1 time.  For Maintenance: every 8 weeks   More than a month       Objective    Last Vitals  Temp Pulse Resp BP MAP O2 Sat   36.7 °C (98.1 °F) 88 18 111/81   97 %     Physical Examination  Gen:  Alert, oriented, well-nourished, NAD  Pulm:  Normal respiratory effort  Abd:  Gravid, soft, nontender  Obstetrics  FHTs:  135, moderate variability, no decels, +accels, appropriate for gestational age  TOCO:  None  Ext:  Trace edema, no calf tenderness  Neuro:  Grossly intact  Non-Stress Test   Baseline Fetal Heart Rate for Non-Stress Test: 135 BPM  Variability in Waveform for Non-Stress Test: Moderate  Accelerations in Non-Stress Test: Yes, greater than/equal to 10 bpm, lasting at least 10 seconds  Decelerations in Non-Stress Test: None  Contractions in Non-Stress Test: Not present  Interpretation of Non-Stress Test   Interpretation of Non-Stress Test: Reactive         Lab Review  -Abnormal 3hr GTT  -Mild anemia on CBC

## 2024-07-15 ENCOUNTER — TELEPHONE (OUTPATIENT)
Dept: OBSTETRICS AND GYNECOLOGY | Facility: CLINIC | Age: 34
End: 2024-07-15
Payer: COMMERCIAL

## 2024-07-15 DIAGNOSIS — O24.419 GESTATIONAL DIABETES MELLITUS (GDM) IN THIRD TRIMESTER, GESTATIONAL DIABETES METHOD OF CONTROL UNSPECIFIED (HHS-HCC): ICD-10-CM

## 2024-07-15 DIAGNOSIS — O24.410 DIET CONTROLLED GESTATIONAL DIABETES MELLITUS (GDM) IN THIRD TRIMESTER (HHS-HCC): Primary | ICD-10-CM

## 2024-07-15 DIAGNOSIS — O24.419 GESTATIONAL DIABETES MELLITUS (GDM) IN THIRD TRIMESTER, GESTATIONAL DIABETES METHOD OF CONTROL UNSPECIFIED (HHS-HCC): Primary | ICD-10-CM

## 2024-07-15 NOTE — TELEPHONE ENCOUNTER
Spoke with patient this morning.  Discussed diagnosis of gestational diabetes.  Prescription in for #120 test strips.  For 2 days patient experiencing significant vertigo.  Antivert of limited benefit.  Seen in labor and delivery triage yesterday.  Recommend appointment with primary care.  Referral to maternal-fetal medicine, diabetic teaching, MAC imaging.

## 2024-07-16 ENCOUNTER — OFFICE VISIT (OUTPATIENT)
Dept: PRIMARY CARE | Facility: CLINIC | Age: 34
End: 2024-07-16
Payer: COMMERCIAL

## 2024-07-16 VITALS
DIASTOLIC BLOOD PRESSURE: 72 MMHG | SYSTOLIC BLOOD PRESSURE: 122 MMHG | WEIGHT: 214 LBS | BODY MASS INDEX: 36.54 KG/M2 | HEART RATE: 86 BPM | HEIGHT: 64 IN

## 2024-07-16 DIAGNOSIS — O24.419 GESTATIONAL DIABETES MELLITUS (GDM), ANTEPARTUM, GESTATIONAL DIABETES METHOD OF CONTROL UNSPECIFIED (HHS-HCC): ICD-10-CM

## 2024-07-16 DIAGNOSIS — Z3A.29 29 WEEKS GESTATION OF PREGNANCY (HHS-HCC): ICD-10-CM

## 2024-07-16 DIAGNOSIS — O26.53: Primary | ICD-10-CM

## 2024-07-16 PROBLEM — O28.3 INCREASED NUCHAL TRANSLUCENCY SPACE ON FETAL ULTRASOUND: Status: ACTIVE | Noted: 2024-07-16

## 2024-07-16 PROCEDURE — 3078F DIAST BP <80 MM HG: CPT | Performed by: FAMILY MEDICINE

## 2024-07-16 PROCEDURE — 3074F SYST BP LT 130 MM HG: CPT | Performed by: FAMILY MEDICINE

## 2024-07-16 PROCEDURE — 1036F TOBACCO NON-USER: CPT | Performed by: FAMILY MEDICINE

## 2024-07-16 PROCEDURE — 99214 OFFICE O/P EST MOD 30 MIN: CPT | Performed by: FAMILY MEDICINE

## 2024-07-16 RX ORDER — FLASH GLUCOSE SENSOR
KIT MISCELLANEOUS
Qty: 2 EACH | Refills: 11 | Status: CANCELLED | OUTPATIENT
Start: 2024-07-16

## 2024-07-16 RX ORDER — BLOOD-GLUCOSE SENSOR
EACH MISCELLANEOUS
Qty: 2 EACH | Refills: 11 | Status: SHIPPED | OUTPATIENT
Start: 2024-07-16

## 2024-07-16 NOTE — PROGRESS NOTES
Subjective   Patient ID: Archana Mullen is a 34 y.o. female who presents for Vertigo (For the last few days).    Past Medical, Surgical, and Family History reviewed and updated in chart.    Reviewed all medications by prescribing practitioner or clinical pharmacist (such as prescriptions, OTCs, herbal therapies and supplements) and documented in the medical record.    HPI  1. Orthostatic Hypotension  Archana presents with concerns of vertigo and lightheadedness for the past two days. She reports undergoing glucose testing on Saturday and not feeling well afterward. The glucose test results were abnormal and will be referred to Maternal-Fetal Medicine (MFM) for further management. Archana continues to experience vertigo and dizziness upon standing, which is relieved by lying down. She denies any abdominal discomfort and has no significant lower limb edema.    2. Gestational Diabetes  Due to the abnormal glucose results, Archana has been diagnosed with gestational diabetes and will be seeing MFM in a few weeks.    On July 14, she was seen by Dr. Walter in the OB/Gyn department, who suggested attending a diabetes boot camp and starting glucometer testing. Archana expressed interest in considering a continuous glucose monitor to better control her blood sugar levels. She noted that the dizziness improved with the use of meclizine. She has been instructed to test her blood sugar four times daily, and she would appreciate any additional assistance to manage her condition effectively.    Review of Systems  All pertinent positive symptoms are included in the history of present illness.    All other systems have been reviewed and are negative and noncontributory to this patient's current ailments.    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    evaluated at     Crohn's disease of small intestine without complications ([facility]) 08/18/2022    Crohn's disease of ileum without complication    Other urticaria 11/11/2021    Chronic urticaria    Papillomavirus as the cause of diseases classified elsewhere     HPV in female    Personal history of other diseases of the female genital tract     History of abnormal cervical Papanicolaou smear     Past Surgical History:   Procedure Laterality Date    APPENDECTOMY  04/20/2020    COLONOSCOPY      ESOPHAGOGASTRODUODENOSCOPY      OVARIAN CYST REMOVAL Right 08/18/2009    TONSILLECTOMY       Social History     Tobacco Use    Smoking status: Former     Types: Cigarettes     Passive exposure: Past    Smokeless tobacco: Never   Vaping Use    Vaping status: Never Used   Substance Use Topics    Alcohol use: Not Currently    Drug use: Never     Family History   Problem Relation Name Age of Onset    Stroke Maternal Grandmother      Lung cancer Maternal Grandmother      Colon cancer Maternal Grandfather      Stroke Paternal Grandmother      Inflammatory bowel disease Cousin Maternal      Immunization History   Administered Date(s) Administered    Flu vaccine (IIV4), preservative free *Check age/dose* 11/19/2019, 09/13/2020, 11/15/2021, 11/16/2022    Influenza, Unspecified 12/01/2017, 10/18/2018, 12/10/2018    MMR vaccine, subcutaneous (MMR II) 04/24/2001    Moderna SARS-CoV-2 Vaccination 05/07/2021, 06/08/2021, 12/30/2021    Pneumococcal conjugate vaccine, 13-valent (PREVNAR 13) 10/27/2017    Td (adult), unspecified 04/24/2001    Tdap vaccine, age 7 year and older (BOOSTRIX, ADACEL) 09/26/2015     Current Outpatient Medications   Medication Instructions    Autolet lancing device Test 4 times a day: fasting and 1 hour after breakfast/lunch/dinner    blood sugar diagnostic strip 1 strip, miscellaneous, 4 times  "daily, Please dispense strips compatible with patients meter    blood-glucose meter misc 1 kit, soft tissue injection, 4 times daily, Test and record values 4 times a day: fasting (upon waking) and 1 hour after breakfast/lunch/dinner (1 hour from first bite if meal finished within 30 minutes)    blood-glucose sensor (FreeStyle Taylor 3 Sensor) device 28-day supply of FreeStyle Taylor 3 sensors (2 sensors); use as directed    escitalopram (LEXAPRO) 10 mg, oral, Daily    isopropyl alcohoL 70 % towelette Test 4 times a day: fasting and 1 hour after breakfast/lunch/dinner    meclizine (ANTIVERT) 25 mg, oral, 3 times daily PRN    prenatal no115/iron/folic acid (PRENATAL 19 ORAL) 1 capsule, oral, Daily    vedolizumab (ENTYVIO) 300 mg, intravenous, Once, For Maintenance: every 8 weeks     Allergies   Allergen Reactions    Bupropion Swelling and Rash    Penicillin V Rash     Penicillin V Potassium TABS       Objective   Vitals:    07/16/24 1431   BP: 122/72   Pulse: 86   Weight: 97.1 kg (214 lb)   Height: 1.626 m (5' 4\")     Body mass index is 36.73 kg/m².    BP Readings from Last 3 Encounters:   07/16/24 122/72   07/14/24 113/64   07/09/24 108/70      Wt Readings from Last 3 Encounters:   07/16/24 97.1 kg (214 lb)   07/14/24 95.4 kg (210 lb 5.1 oz)   07/09/24 95.7 kg (211 lb)        Admission on 07/14/2024, Discharged on 07/14/2024   Component Date Value    Glucose 07/14/2024 83     Sodium 07/14/2024 137     Potassium 07/14/2024 4.0     Chloride 07/14/2024 108 (H)     Bicarbonate 07/14/2024 19 (L)     Anion Gap 07/14/2024 14     Urea Nitrogen 07/14/2024 5 (L)     Creatinine 07/14/2024 0.43 (L)     eGFR 07/14/2024 >90     Calcium 07/14/2024 7.8 (L)     POCT Glucose 07/14/2024 88     Beta-Hydroxybutyrate 07/14/2024 0.16    Lab on 07/12/2024   Component Date Value    Glucose, Fasting 07/12/2024 84     Glucose, One hour 07/12/2024 191 (H)     Glucose, Two hour 07/12/2024 184 (H)     Glucose, Three hour 07/12/2024 128    Routine " Prenatal on 07/09/2024   Component Date Value    WBC 07/09/2024 11.2     nRBC 07/09/2024 0.0     RBC 07/09/2024 3.46 (L)     Hemoglobin 07/09/2024 10.9 (L)     Hematocrit 07/09/2024 34.7 (L)     MCV 07/09/2024 100     MCH 07/09/2024 31.5     MCHC 07/09/2024 31.4 (L)     RDW 07/09/2024 14.4     Platelets 07/09/2024 217     Glucose, 1 Hr Screen, Pr* 07/09/2024 146 (H)     Syphilis Total Ab 07/09/2024 Nonreactive      Physical Exam  CONSTITUTIONAL - well nourished, well developed, looks like stated age, in no acute distress, not ill-appearing, and not tired appearing  SKIN - normal skin color and pigmentation, normal skin turgor without rash, lesions, or nodules visualized  HEAD - no trauma, normocephalic  EYES - extraocular muscles are intact, and normal external exam  CHEST - clear to auscultation, no wheezing, no crackles and no rales, good effort  CARDIAC - regular rate and regular rhythm, no skipped beats, no murmur  ABDOMEN - normal 30 week gravid uterus,  no organomegaly, soft, nontender  EXTREMITIES - no obvious or evident edema, no obvious or evident deformities  NEUROLOGICAL - normal gait, normal balance, normal motor,  alert, oriented and no focal signs  PSYCHIATRIC - alert, pleasant and cordial, age-appropriate    Assessment/Plan   Problem List Items Addressed This Visit       Gestational diabetes mellitus (GDM), antepartum ([facility])     Highly recommend a continuous glucose monitor  Discussed nutritional options for sugar free as well as avoiding fruit and carbohydrates that are rich in sugar  Meats and vegetables are the safest options for sugar free nutrition  Please keep MFM appointment         Relevant Medications    blood-glucose sensor (FreeStyle Taylor 3 Sensor) device    Maternal hypotension syndrome in third trimester ([facility]) - Primary     Discussed supportive treatments, including maintaining adequate hydration and avoiding compression of the inferior vena cava (IVC).  You have pregnancy pillows  and wedges to assist with this.    Due to the lack of significant hypotension findings in your vital signs or physical exam, we are encouraging supportive treatments at this time.     Please ensure to keep the upcoming MFM appointment.         29 weeks gestation of pregnancy ([facility])     Congratulations on your baby!   POC US showed normal movement and cardiac activity  Continue to keep your OB and MFM appointments  We are excited to meet your baby in a few months!

## 2024-07-16 NOTE — ASSESSMENT & PLAN NOTE
Congratulations on your baby!   POC US showed normal movement and cardiac activity  Continue to keep your OB and MFM appointments  We are excited to meet your baby in a few months!

## 2024-07-16 NOTE — ASSESSMENT & PLAN NOTE
Highly recommend a continuous glucose monitor  Discussed nutritional options for sugar free as well as avoiding fruit and carbohydrates that are rich in sugar  Meats and vegetables are the safest options for sugar free nutrition  Please keep MFM appointment

## 2024-07-16 NOTE — ASSESSMENT & PLAN NOTE
Discussed supportive treatments, including maintaining adequate hydration and avoiding compression of the inferior vena cava (IVC).  You have pregnancy pillows and wedges to assist with this.    Due to the lack of significant hypotension findings in your vital signs or physical exam, we are encouraging supportive treatments at this time.     Please ensure to keep the upcoming MFM appointment.

## 2024-07-17 DIAGNOSIS — R42 DIZZINESS: Primary | ICD-10-CM

## 2024-07-17 RX ORDER — SCOLOPAMINE TRANSDERMAL SYSTEM 1 MG/1
1 PATCH, EXTENDED RELEASE TRANSDERMAL
Qty: 4 PATCH | Refills: 2 | Status: SHIPPED | OUTPATIENT
Start: 2024-07-17

## 2024-07-18 ENCOUNTER — DOCUMENTATION (OUTPATIENT)
Dept: MATERNAL FETAL MEDICINE | Facility: CLINIC | Age: 34
End: 2024-07-18

## 2024-07-18 ENCOUNTER — APPOINTMENT (OUTPATIENT)
Dept: OTOLARYNGOLOGY | Facility: CLINIC | Age: 34
End: 2024-07-18
Payer: COMMERCIAL

## 2024-07-18 ENCOUNTER — OFFICE VISIT (OUTPATIENT)
Dept: OTOLARYNGOLOGY | Facility: CLINIC | Age: 34
End: 2024-07-18
Payer: COMMERCIAL

## 2024-07-18 VITALS — HEIGHT: 64 IN | BODY MASS INDEX: 36.54 KG/M2 | WEIGHT: 214 LBS | TEMPERATURE: 97.7 F

## 2024-07-18 DIAGNOSIS — H83.09 VIRAL LABYRINTHITIS, UNSPECIFIED LATERALITY: ICD-10-CM

## 2024-07-18 DIAGNOSIS — R42 DIZZINESS: Primary | ICD-10-CM

## 2024-07-18 PROCEDURE — 3008F BODY MASS INDEX DOCD: CPT | Performed by: OTOLARYNGOLOGY

## 2024-07-18 PROCEDURE — 1036F TOBACCO NON-USER: CPT | Performed by: OTOLARYNGOLOGY

## 2024-07-18 PROCEDURE — 99203 OFFICE O/P NEW LOW 30 MIN: CPT | Performed by: OTOLARYNGOLOGY

## 2024-07-18 ASSESSMENT — ENCOUNTER SYMPTOMS
DIZZINESS: 1
FATIGUE: 1
HEADACHES: 1

## 2024-07-18 NOTE — PROGRESS NOTES
Patient did not attend the Gestational Diabetes  Boot [place] education program.   If the patient would like to participate in the education offered by our team she is encouraged to contact the [facility] team  @ [phone].

## 2024-07-18 NOTE — PROGRESS NOTES
Subjective   Patient ID: Archana Mullen is a 34 y.o. female  HPI  Patient is complaining of 2-day history of dizziness.  She describes the dizziness as a spinning sensation and especially when she is standing or walking.  She has no otalgia and no otorrhea.  She has no tinnitus.  She presented to the emergency room and was started on meclizine and has noticed some improvement.    Review of Systems   Constitutional:  Positive for fatigue.   Eyes:         Double vision, blurred vision   Neurological:  Positive for dizziness and headaches.       Objective   Physical Exam  The following elements of a detailed head and neck exam were performed: General appearance, the skin of the head and neck, inspection of the external ears and ear canal the tympanic membranes, inspection of the mobility of the tympanic membranes, the appearance of the external nose, the nasal mucosa and septum and nasal turbinates, the lips, the teeth, the gums, the tongue, the oral mucosa and the palate, inspection of the tonsils and the posterior pharyngeal wall, indirect mirror laryngoscopy and inspection of the hypopharynx, palpation of the lymph nodes in the neck, and palpation of the thyroid, palpation of the salivary glands, cranial nerve exam including cranial nerves II, III, IV, V, VI, and VII, and cranial nerves IX, X, XI, and XII, and the subjective evaluation of the voice, the inspection of the neck for the presence of respiratory retractions, and the presence or absence of stridor.    Her exam is within normal limits.  There is no spontaneous nystagmus noted.  Finger-to-nose and tandem gait and Georgie-Hallpike reveal some leaning to the left side with tandem gait.    Assessment/Plan   Diagnoses and all orders for this visit:  Dizziness (Primary)  Viral labyrinthitis, unspecified laterality     Dizziness and imbalance with some leaning to the left side with tandem gait consistent clinically with viral labyrinthitis.  Her cranial nerve exam  is otherwise normal.  The patient is 30 weeks pregnant at this point.  Treatment with steroids was discussed and will be avoided due to her pregnancy.  She was scheduled for an audiogram and tympanogram and she will follow-up in 2 to 3 weeks.

## 2024-07-23 DIAGNOSIS — R42 VERTIGO: ICD-10-CM

## 2024-07-24 ENCOUNTER — APPOINTMENT (OUTPATIENT)
Dept: AUDIOLOGY | Facility: CLINIC | Age: 34
End: 2024-07-24
Payer: COMMERCIAL

## 2024-07-25 ENCOUNTER — DOCUMENTATION (OUTPATIENT)
Dept: MATERNAL FETAL MEDICINE | Facility: CLINIC | Age: 34
End: 2024-07-25
Payer: COMMERCIAL

## 2024-07-25 NOTE — PROGRESS NOTES
The patient attended the Gestational Diabetes Self-Management [employer] Education Program    Overview of Diabetes    Type 1    Type 2    Gestational       -Risks to baby and Mom  Self-monitoring     Tips for Testing/troubleshooting glucometers    Goal range for blood sugars (<95 fasting, <140 1 hour postprandial for all meals)    Record Keeping    Blood Sugar Line    Blood Sugar Log Sheets - provided  Managing Diabetes     Physical Activity - recommendation is about 150 minutes per week    Medication        Oral/insulin overview  Additional  testing     NST/BPP/Growth ultrasound - general overview  Postpartum     Expectations in the hospital    Follow up - recommend fasting, 75g OGGT, 6-8 weeks after delivery     50% change of developing GDM in another pregnancy    50% chance of developing T2DM within next 10 years    Up to 30% of patients will have pre-diabetes or T2DM following delivery       Breastfeeding is strongly encouraged   Special considerations, self-management    Hypoglycemia    Hyperglycemia    Sick Day Rules  Resilience training/stress management    Engage your senses    Gratitude practice  Emotional support     “Baby Blues”    Postpartum Depression       When to call for help  Nutrition planning     Identify carbohydrates, serving size, carbohydrate counting    “Free Foods” - very low carbohydrate options    Label Reading    Personalized Meal Plan     3 meals + 3 snacks = approximately 175g carbohydrates/day    Follow up for 1:1 Registered Dietician consult       [phone] to schedule appointment    Individual consult with Maternal Fetal Medicine provider is scheduled.

## 2024-07-30 ENCOUNTER — INITIAL PRENATAL (OUTPATIENT)
Dept: MATERNAL FETAL MEDICINE | Facility: CLINIC | Age: 34
End: 2024-07-30
Payer: COMMERCIAL

## 2024-07-30 ENCOUNTER — HOSPITAL ENCOUNTER (OUTPATIENT)
Dept: RADIOLOGY | Facility: CLINIC | Age: 34
Discharge: HOME | End: 2024-07-30
Payer: COMMERCIAL

## 2024-07-30 VITALS — BODY MASS INDEX: 36.73 KG/M2 | DIASTOLIC BLOOD PRESSURE: 66 MMHG | WEIGHT: 214 LBS | SYSTOLIC BLOOD PRESSURE: 106 MMHG

## 2024-07-30 DIAGNOSIS — O24.410 DIET CONTROLLED GESTATIONAL DIABETES MELLITUS (GDM) IN THIRD TRIMESTER (HHS-HCC): ICD-10-CM

## 2024-07-30 DIAGNOSIS — O09.519 ADVANCED MATERNAL AGE, PRIMIGRAVIDA, ANTEPARTUM (HHS-HCC): ICD-10-CM

## 2024-07-30 DIAGNOSIS — K50.90 MATERNAL CROHN'S DISEASE AFFECTING PREGNANCY, ANTEPARTUM (MULTI): Primary | ICD-10-CM

## 2024-07-30 DIAGNOSIS — O24.419 GESTATIONAL DIABETES MELLITUS (GDM), ANTEPARTUM, GESTATIONAL DIABETES METHOD OF CONTROL UNSPECIFIED (HHS-HCC): ICD-10-CM

## 2024-07-30 DIAGNOSIS — O09.819 PREGNANCY RESULTING FROM ASSISTED REPRODUCTIVE TECHNOLOGY, ANTEPARTUM (HHS-HCC): ICD-10-CM

## 2024-07-30 DIAGNOSIS — O99.619 MATERNAL CROHN'S DISEASE AFFECTING PREGNANCY, ANTEPARTUM (MULTI): Primary | ICD-10-CM

## 2024-07-30 DIAGNOSIS — Z3A.31 31 WEEKS GESTATION OF PREGNANCY (HHS-HCC): ICD-10-CM

## 2024-07-30 DIAGNOSIS — F32.A ANXIETY AND DEPRESSION: ICD-10-CM

## 2024-07-30 DIAGNOSIS — F41.9 ANXIETY AND DEPRESSION: ICD-10-CM

## 2024-07-30 PROCEDURE — 76811 OB US DETAILED SNGL FETUS: CPT

## 2024-07-30 PROCEDURE — 99215 OFFICE O/P EST HI 40 MIN: CPT | Mod: 25 | Performed by: NURSE PRACTITIONER

## 2024-07-30 PROCEDURE — 76820 UMBILICAL ARTERY ECHO: CPT

## 2024-07-30 PROCEDURE — 76819 FETAL BIOPHYS PROFIL W/O NST: CPT

## 2024-07-30 ASSESSMENT — ENCOUNTER SYMPTOMS
CARDIOVASCULAR NEGATIVE: 0
RESPIRATORY NEGATIVE: 0
PSYCHIATRIC NEGATIVE: 0
GASTROINTESTINAL NEGATIVE: 0
CONSTITUTIONAL NEGATIVE: 0
MUSCULOSKELETAL NEGATIVE: 0
ALLERGIC/IMMUNOLOGIC NEGATIVE: 0
HEMATOLOGIC/LYMPHATIC NEGATIVE: 0
EYES NEGATIVE: 0
ENDOCRINE NEGATIVE: 0
NEUROLOGICAL NEGATIVE: 0

## 2024-07-30 NOTE — ASSESSMENT & PLAN NOTE
Patient was recently diagnosed with gestational diabetes (GDM).  We discussed the pregnancy implications of the diagnosis including increased risk of pre-eclampsia, LGA/macrosomia, shoulder dystocia, stillbirth as well as  hypoglycemia, hyperbilirubinemia and respiratory distress.  We reviewed the importance of glycemic control and its impact on lowering these risks.  Discussed management ranging from dietary changes to pharmacotherapy and that insulin is considered first line therapy rather than oral agents if medication is ultimately needed.  Discussed our recommendation for serial growth ultrasounds and starting  testing at 32 weeks if treatment is required.  We also stressed the potential persistence of impaired glucose tolerance post pregnancy in up to 30% of patients and 50% risk of IGT/T2DM in the next 10 years with an overall lifetime risk of T2DM of 70%. We reviewed the recommendation for postpartum screening at 6 weeks post-partum (can be performed as soon as 2 days after delivery) and, if normal, routine screening every 1-3 years. We did discuss that a healthy diet and maintenance of a normal body weight may reduce those risks    In summary the following is recommended:    1. We will continue to co-manage diabetes via the Bloodsugar line with weekly assessment of glycemic control.  Current regimen is: nutrition  2. We will plan for a follow up [facility] visit at 36 weeks to assess overall control and provide delivery timing recommendations.  3. Recommend serial growth ultrasounds every 4 weeks starting at 28 weeks gestation.  4. Weekly  testing is recommended starting at 32 weeks IF medication is required OR there is a LGA growth pattern.  Twice weekly testing is recommended at 32 weeks if control is suboptimal, there is polyhydramnios, or medication is required AND there is a LGA growth pattern.  5. Delivery is recommended at 39 weeks, though if glycemic control is suboptimal then  delivery at 37-39 weeks may be considered.  6. If the EFW is >4500g at the time of delivery  should be considered.  7. A 2hr GTT is recommended 6 weeks postpartum (can be performed as soon as 2 days postpartum), if normal then screening for T2DM is recommended at least every 3 years.  8. Prior to scheduled delivery the following is recommended regarding insulin management:

## 2024-07-30 NOTE — PROGRESS NOTES
Archana Mullen is a 34 y.o. here for MFM consultation at 31w5d for GDM, referred by Dr. Waddell     Overall pt reports, she is feeling well. Feeling +FM. Denies VB, LOF or CTXs.       issues:  GDM - boot camp    Crohns - on Entyvio- q8 wks --> last   IVF pregnancy - with , 1 blastocyst in cryo   Anxiety/Depression    ObHx-  GynHx-h/o irregulat periods, last PAP 2024- neg cytology; no HPV testing    MedHx-Crohns, depression/anxiety, chronic migraines, non-epileptic seizures, vertigo-- recently diagnosed and just started PT  SurgHx-appendectomy, tonsillectomy,draining of ovarian cyst, 2 egg retrievals, 3 FET  FamHx-several family members with diabetes  SocHx-denies tobacco,EOTTOH, or ilicit drugs  All-pencilin (in infancy), wellbutrin (hives)  Meds-bASA 162mg every day,Entyvio, qlipta for migraines (not while pregnant), lexapro, PNV      Visit Vitals  /66   Wt 97.1 kg (214 lb)   LMP  (LMP Unknown)   BMI 36.73 kg/m²   OB Status Pregnant   Smoking Status Former   BSA 2.09 m²      Gen-NAD  Cardiac- good peripheral perfusion  Respiratory- non-labored breathing  Abdomen- soft, non-tender, gravid   Extremities- symmetrical   Pelvic- deferred      Sono-EFW 24%, AC 10%, 2V cord, low normal growth, DAVID wnl, BPP      Problem List Items Addressed This Visit          Pregnancy #2    Pregnancy resulting from assisted reproductive technology, antepartum ([facility])    Overview     US at 30 and 36w  Weekly NSTs at 34-35 wks   1 embryo in cryo, completed IVF at    Fetal echo discussed  and recommended especially in the setting of incomplete heart views (pt counseled while in ultrasound per Dr. Colby)          Maternal Crohn's disease affecting pregnancy, antepartum (Multi) - Primary    Overview     Sees Dr. Angel Kirkland, GI  Entyvio 300 mg infusions every 8 weeks up until 34 weeks pregnant and then would hold infusions until after delivery   Last infusion --> plan for next infusion  to be PP          Current Assessment & Plan     Typically, in women with well controlled Crohn's disease tolerate pregnancy well and relapse rates are likely similar to women who are not pregnant (~1/3 will have a relapse during pregnancy).  Women with a flare within the 6 months prior to conception and during pregnancy have an increased chance of flare during pregnancy and postpartum. While there is some association with miscarriage, stillbirth, fetal growth abnormalities and  delivery though this is most likely an association with women who have active disease during their pregnancy. However we do recommend additional surveillance of fetal growth.     Interestingly, women with ulcerative colitis may have higher rates of relapse during pregnancy as compared to Crohn's disease, though the mechanism is unclear.    We reviewed that Entyvio is considered safe in pregnancy and should be continued to decrease her risk of active disease.  If needed, prednisone can also be used for flare or persistent, severe symptoms. We recommend that she continue to follow with her gastroenterologist during pregnancy and in the postpartum period.     We discussed route of delivery, and reviewed that  section is generally not routinely  recommended except in the setting of active perineal or rectal disease or other obstetric indications.             Gestational diabetes mellitus (GDM), antepartum ([facility])    Overview     -Shared Care with   - Attended Boot Camp   - MFM Consult completed   -M 36 wk visit pending   -Serial growth ultrasounds starting at 28 weeks    Last ultrasound: : EFW 24%, AC 10%, 2VC, incomplete heart views (counseled in US per Dr. Colby re: new ultrasound findings    Fetal surveillance: if insulin initiated   -Weekly at 32 weeks  -Twice weekly at 36 weeks    Current Regimen: nutrition-> BG log reviewed and more than 80% within goal range  24 nutrition        Delivery  Plan: pending 36 week follow up visit  Intrapartum:  GDM protocol  Postpartum: No medication    Recommend PP 2hr gtt and q3yr F/U with PCP for A1C and TSH           Current Assessment & Plan     Patient was recently diagnosed with gestational diabetes (GDM).  We discussed the pregnancy implications of the diagnosis including increased risk of pre-eclampsia, LGA/macrosomia, shoulder dystocia, stillbirth as well as  hypoglycemia, hyperbilirubinemia and respiratory distress.  We reviewed the importance of glycemic control and its impact on lowering these risks.  Discussed management ranging from dietary changes to pharmacotherapy and that insulin is considered first line therapy rather than oral agents if medication is ultimately needed.  Discussed our recommendation for serial growth ultrasounds and starting  testing at 32 weeks if treatment is required.  We also stressed the potential persistence of impaired glucose tolerance post pregnancy in up to 30% of patients and 50% risk of IGT/T2DM in the next 10 years with an overall lifetime risk of T2DM of 70%. We reviewed the recommendation for postpartum screening at 6 weeks post-partum (can be performed as soon as 2 days after delivery) and, if normal, routine screening every 1-3 years. We did discuss that a healthy diet and maintenance of a normal body weight may reduce those risks    In summary the following is recommended:    1. We will continue to co-manage diabetes via the Bloodsugar line with weekly assessment of glycemic control.  Current regimen is: nutrition  2. We will plan for a follow up [facility] visit at 36 weeks to assess overall control and provide delivery timing recommendations.  3. Recommend serial growth ultrasounds every 4 weeks starting at 28 weeks gestation.  4. Weekly  testing is recommended starting at 32 weeks IF medication is required OR there is a LGA growth pattern.  Twice weekly testing is recommended at 32 weeks if  control is suboptimal, there is polyhydramnios, or medication is required AND there is a LGA growth pattern.  5. Delivery is recommended at 39 weeks, though if glycemic control is suboptimal then delivery at 37-39 weeks may be considered.  6. If the EFW is >4500g at the time of delivery  should be considered.  7. A 2hr GTT is recommended 6 weeks postpartum (can be performed as soon as 2 days postpartum), if normal then screening for T2DM is recommended at least every 3 years.  8. Prior to scheduled delivery the following is recommended regarding insulin management:          Advanced maternal age, primigravida, antepartum ([facility])    Overview     -rr cfDNA   -Serial growth   -Weekly  testing at 34 wks if not initiated sooner          31 weeks gestation of pregnancy ([facility])    Overview     -Primary OB: Dr. Waddell             Other    Anxiety and depression    Overview     - on lexapro for several years   -Feels her symptoms are stable , but worries about increased symptoms postpartum  -would recommend continued use through postpartum and counseled (see [facility] consult note )          Current Assessment & Plan     Anxiety and depression  Current medications: Lexapro 10mg every day   SSRI/SNRIs increase risk of  Poor Adjustment Syndrome: Irritability, feeding difficulties, increased respiratory rate. Severe symptoms are rare and occur in 3%. Symptoms peak in 24-48h and resolve in a week. There is no evidence that discontinuing or tapering these medications in the third trimester reduces this risk, but it does increase the risk of maternal relapse.   When weighing the risks and benefits, in regards to continued treatment, it is important to note that having untreated psychiatric disorders is related to pregnancy complications (LBW, PTD).  Each patient has to make an individualized decision based upon their level of comfort with either continuation or discontinuation of medication.  If the  patient is taking multiple psychiatric medications, it is recommended to try and use monotherapy with the lowest dose.                  Other Visit Diagnoses       Diet controlled gestational diabetes mellitus (GDM) in third trimester ([facility])                 Weekly communication with blood sugar line   Weekly  testing at 34 wks if not initiated sooner   MFM F/U with growth x3 wks     Mary Anne Diop, APRN-CNP  Maternal Fetal Medicine

## 2024-07-30 NOTE — ASSESSMENT & PLAN NOTE
Typically, in women with well controlled Crohn's disease tolerate pregnancy well and relapse rates are likely similar to women who are not pregnant (~1/3 will have a relapse during pregnancy).  Women with a flare within the 6 months prior to conception and during pregnancy have an increased chance of flare during pregnancy and postpartum. While there is some association with miscarriage, stillbirth, fetal growth abnormalities and  delivery though this is most likely an association with women who have active disease during their pregnancy. However we do recommend additional surveillance of fetal growth.     Interestingly, women with ulcerative colitis may have higher rates of relapse during pregnancy as compared to Crohn's disease, though the mechanism is unclear.    We reviewed that Entyvio is considered safe in pregnancy and should be continued to decrease her risk of active disease.  If needed, prednisone can also be used for flare or persistent, severe symptoms. We recommend that she continue to follow with her gastroenterologist during pregnancy and in the postpartum period.     We discussed route of delivery, and reviewed that  section is generally not routinely  recommended except in the setting of active perineal or rectal disease or other obstetric indications.

## 2024-07-30 NOTE — ASSESSMENT & PLAN NOTE
Anxiety and depression  Current medications: Lexapro 10mg every day   SSRI/SNRIs increase risk of  Poor Adjustment Syndrome: Irritability, feeding difficulties, increased respiratory rate. Severe symptoms are rare and occur in 3%. Symptoms peak in 24-48h and resolve in a week. There is no evidence that discontinuing or tapering these medications in the third trimester reduces this risk, but it does increase the risk of maternal relapse.   When weighing the risks and benefits, in regards to continued treatment, it is important to note that having untreated psychiatric disorders is related to pregnancy complications (LBW, PTD).  Each patient has to make an individualized decision based upon their level of comfort with either continuation or discontinuation of medication.  If the patient is taking multiple psychiatric medications, it is recommended to try and use monotherapy with the lowest dose.

## 2024-08-05 ENCOUNTER — TELEPHONE (OUTPATIENT)
Dept: MATERNAL FETAL MEDICINE | Facility: HOSPITAL | Age: 34
End: 2024-08-05
Payer: COMMERCIAL

## 2024-08-05 NOTE — TELEPHONE ENCOUNTER
Blood Sugar Support Line Communication   Patient is new to the Bloods Sugar Support Line    Communicated with the patient on 8/5/2024   She has Gestational Diabetes @ 32w4d    The patient checks her sugars fasting and 1 hour after meals. Her current regimen is as follows:  Nutrition plan      The patient's reported blood sugars appear well controlled, with 80% within the goal range. Goal range glucose is Fasting <95, 1 hr after meals <140.    Occasional elevation - new to diet. I.e. Pizza  No changes to her current treatment plan are indicated at this time.    Patient understands to submit sugar log for review weekly through the Blood Sugar Line @ [phone] or via email to [email or web address] to help optimize glucose control.    I spent approximately 8-10 minutes on the phone with the patient

## 2024-08-09 ENCOUNTER — ANCILLARY PROCEDURE (OUTPATIENT)
Dept: PEDIATRIC CARDIOLOGY | Facility: CLINIC | Age: 34
End: 2024-08-09
Payer: COMMERCIAL

## 2024-08-09 ENCOUNTER — OFFICE VISIT (OUTPATIENT)
Dept: PEDIATRIC CARDIOLOGY | Facility: CLINIC | Age: 34
End: 2024-08-09
Payer: COMMERCIAL

## 2024-08-09 VITALS
HEIGHT: 64 IN | WEIGHT: 216.05 LBS | BODY MASS INDEX: 36.89 KG/M2 | SYSTOLIC BLOOD PRESSURE: 114 MMHG | HEART RATE: 99 BPM | DIASTOLIC BLOOD PRESSURE: 81 MMHG

## 2024-08-09 DIAGNOSIS — O35.9XX0 SUSPECTED FETAL ABNORMALITY AFFECTING MANAGEMENT OF MOTHER, SINGLE OR UNSPECIFIED FETUS (HHS-HCC): Primary | ICD-10-CM

## 2024-08-09 DIAGNOSIS — O35.9XX0 SUSPECTED FETAL ANOMALY, ANTEPARTUM (HHS-HCC): ICD-10-CM

## 2024-08-09 DIAGNOSIS — O35.8XX0 MATERNAL CARE FOR OTHER (SUSPECTED) FETAL ABNORMALITY AND DAMAGE, NOT APPLICABLE OR UNSPECIFIED (HHS-HCC): ICD-10-CM

## 2024-08-09 PROCEDURE — 93325 DOPPLER ECHO COLOR FLOW MAPG: CPT | Performed by: PEDIATRICS

## 2024-08-09 PROCEDURE — 76827 ECHO EXAM OF FETAL HEART: CPT

## 2024-08-09 PROCEDURE — 3079F DIAST BP 80-89 MM HG: CPT | Performed by: PEDIATRICS

## 2024-08-09 PROCEDURE — 99204 OFFICE O/P NEW MOD 45 MIN: CPT | Performed by: PEDIATRICS

## 2024-08-09 PROCEDURE — 3008F BODY MASS INDEX DOCD: CPT | Performed by: PEDIATRICS

## 2024-08-09 PROCEDURE — 3074F SYST BP LT 130 MM HG: CPT | Performed by: PEDIATRICS

## 2024-08-09 PROCEDURE — 76827 ECHO EXAM OF FETAL HEART: CPT | Performed by: PEDIATRICS

## 2024-08-09 PROCEDURE — 99214 OFFICE O/P EST MOD 30 MIN: CPT | Performed by: PEDIATRICS

## 2024-08-09 PROCEDURE — 76825 ECHO EXAM OF FETAL HEART: CPT | Performed by: PEDIATRICS

## 2024-08-09 PROCEDURE — 1036F TOBACCO NON-USER: CPT | Performed by: PEDIATRICS

## 2024-08-09 NOTE — LETTER
2024     Elina Colby MD  [address]  [facility] [address]    Patient: Archana Mullen   YOB: 1990   Date of Visit: 2024       Dear Dr. Elina Colby MD:    Thank you for referring Archana Mullen to me for evaluation. Below are my notes for this consultation.  If you have questions, please do not hesitate to call me. I look forward to following your patient along with you.       Sincerely,     Clarita Taylor MD      CC: MD Leticia Savage, APRN-CNP  Flores Bradley, APRN-CN, DNP  ______________________________________________________________________________________         [facility]  [facility]  Division of Pediatric Cardiology  [facility] [address], [address] [address]  Tel: [phone], Fax: [phone]      Obstetrician: Dr. Yariel Waddell    Archana Mullen was seen at the request of Dr. Elina Colby for incomplete cardiac views on obstetric ultrasound and conception via in vitro fertilization.  Records were reviewed, and a summary of those records is integrated within the history of present illness.  A report with my findings is being sent via written or electronic means to the referring physician with my recommendations.    Accompanied by: spouse  History obtained from: spouse    History of Presentation   History of Present Illness:   Archana Mullen is a 34 y.o. female presenting for initial prenatal cardiology consultation and fetal echocardiogram for incomplete cardiac views on obstetric ultrasound and conception via in vitro fertilization.  She is  and approximately 33w1d weeks pregnant (No LMP recorded (lmp unknown). Patient is pregnant., Estimated Date of Delivery: 24) with an undisclosed gender fetus.  Her obstetric history is significant for one miscarriage.  Complications of  her current pregnancy include advanced maternal age, gestational diabetes, and fetal anomalies detected on obstetric ultrasound. She is on insulin to manage her blood sugars.  Ms. Archana Mullen had a normal first trimester screen.  Her level 2 obstetric ultrasound for anatomy was performed at 20 weeks gestational age which was normal. She had a repeat ultrasound at 31 weeks that showed a single umbilical artery. She has undergone cell free fetal DNA testing and it was normal.  She has not had invasive genetic testing such as amniocentesis or chorionic villous sampling.  This pregnancy was the result of in vitro fertilization.  She was not using potentially teratogenic medication at the time of conception.  There have been no other complications of this pregnancy.  Ms. Archana Mullen plans to deliver her baby at [facility].     Ms. Archana Mullen feels well today.  She denies any shortness of breath, abdominal cramping, contractions, bleeding, or swelling of the extremities.  She notes frequent fetal movement.      Medical History     Medical Conditions:  Patient Active Problem List   Diagnosis   • Attention deficit hyperactivity disorder (ADHD), combined type, moderate   • Conversion disorder with seizures or convulsions   • Maternal Crohn's disease affecting pregnancy, antepartum (Multi)   • Genetic predisposition to breast cancer   • IBS (irritable bowel syndrome)   • Migraine with aura and without status migrainosus, not intractable   • Anxiety and depression   • Carrier of high risk cancer gene mutation   • Pregnancy resulting from assisted reproductive technology, antepartum ([facility])   • Advanced maternal age, primigravida, antepartum ([facility])   • Gestational diabetes mellitus (GDM), antepartum ([facility])   • Increased nuchal translucency space on fetal ultrasound   • Maternal hypotension syndrome in third trimester ([facility])   • 31 weeks gestation of pregnancy ([facility])     Past  Surgeries:  Past Surgical History:   Procedure Laterality Date   • APPENDECTOMY  04/20/2020   • COLONOSCOPY     • ESOPHAGOGASTRODUODENOSCOPY     • OVARIAN CYST REMOVAL Right 08/18/2009   • TONSILLECTOMY       Current Outpatient Medications   Medication Instructions   • Autolet lancing device Test 4 times a day: fasting and 1 hour after breakfast/lunch/dinner   • blood sugar diagnostic strip 1 strip, miscellaneous, 4 times daily, Please dispense strips compatible with patients meter   • blood-glucose meter misc 1 kit, soft tissue injection, 4 times daily, Test and record values 4 times a day: fasting (upon waking) and 1 hour after breakfast/lunch/dinner (1 hour from first bite if meal finished within 30 minutes)   • blood-glucose sensor (FreeStyle Taylor 3 Sensor) device 28-day supply of FreeStyle Talyor 3 sensors (2 sensors); use as directed   • escitalopram (LEXAPRO) 10 mg, oral, Daily   • isopropyl alcohoL 70 % towelette Test 4 times a day: fasting and 1 hour after breakfast/lunch/dinner   • meclizine (ANTIVERT) 25 mg, oral, 3 times daily PRN   • prenatal no115/iron/folic acid (PRENATAL 19 ORAL) 1 capsule, oral, Daily   • scopolamine (Transderm-Scop) 1 mg over 3 days patch 3 day 1 patch, transdermal, Every 72 hours PRN   • vedolizumab (ENTYVIO) 300 mg, intravenous, Once, For Maintenance: every 8 weeks      Allergies:  Bupropion and Penicillin v    Social History:  Patient lives with spouse.  Occupation: Human resources  Smoking: None. Previous smoker.   Alcohol: None  Drug Use: None  She wears a seatbelt while in the car. She denies any verbal, sexual or physical abuse.     Cardiac Family History (for patient and father of baby):  There is no history of congenital heart disease.  There is no history of early sudden/unexplained death including SIDS and drowning.  There is no history of cardiomyopathy of any type or heart transplant.  There is no history of arrhythmias/pacemaker/defibrillator or arrhythmia syndromes,  "including Long QT syndrome, Brian-Parkinson-White syndrome or Brugada syndrome.  There is no history of heart attack or stroke before the age of 55 years in a close family member.  There is no history of Marfan syndrome or aortic aneurysm.  There is no history of deafness.  There is no history of syncope/fainting.  There is no history of high blood pressure or high cholesterol.  There is no history of DiGeorge Syndrome (22q11).    Physical Examination     /81 (BP Location: Left arm, Patient Position: Sitting, BP Cuff Size: Large adult)   Pulse 99   Ht 1.626 m (5' 4\")   Wt 98 kg (216 lb 0.8 oz)   LMP  (LMP Unknown)   BMI 37.09 kg/m²     General: Alert, well-appearing and in no acute distress.    Abdomen: Soft, nontender, not distended. Gravid.  Extremities: No swelling or edema.  Neurologic / Psychiatric: Grossly intact without focal deficits.  Appropriate demeanor.      Results     Fetal Echocardiogram:    I ordered and interpreted a transabdominal fetal echocardiogram.  I performed a portion of the study myself.  The complete report is available under separate cover.  The results are summarized as follows:    Image quality: Good  Cardiac situs: Cardiac mass in the left chest.  Left ventricular apex points leftward.  Segmental anatomy: Atrio-ventricular concordance.  Ventriculo-arterial concordance.  Normally-related great arteries.  Superior vena cava: Normal connection to the right atrium.  Inferior vena cava: Normal connection to the right atrium.  Pulmonary veins: At least one pulmonary vein connects normally to the left atrium.  Atrial septum: Patent foramen ovale, with flap valve bowing into the left atrium.  Tricuspid valve: Structurally normal.  No obvious stenosis or insufficiency.  Mitral valve: Structurally normal.  No obvious stenosis or insufficiency.  Right ventricle: Normal ventricular size, wall thickness, and systolic function (qualitative).  Left ventricle: Normal ventricular size, wall " thickness, and systolic function (qualitative).  Interventricular septum: No obvious septal defect.  Aortic valve: Structurally normal.  No obvious stenosis or insufficiency.  Pulmonary valve: Structurally normal.  No obvious stenosis or insufficiency.  Pulmonary artery: Normal in size.  Ductal arch: Patent with right to left shunting.  Aortic arch: Left-sided.  Patent.  Pericardial effusion: None.  Umbilical arteries: Two umbilical arteries.  Normal arterial flow pattern.  Umbilical vein: Normal venous flow pattern.  Electrophysiology: Normal fetal heart rate and rhythm.  Normal mechanical WI interval.      Assessment & Plan   Assessment:  Ms. Archana Mullen is a 34 y.o. female who is  and currently at 33w1d weeks gestational age with an  undisclosed gender  fetus.  A fetal echocardiogram was performed today for conception via in vitro fertilization.     Fetal echocardiogram today demonstrated grossly normal fetal cardiac anatomy, qualitatively normal fetal cardiac function, normal fetal heart rhythm, and no evidence of in utero congestive heart failure or hydrops fetalis.  I reviewed the results of today's evaluation, including the findings of the fetal echocardiogram, with Ms. Archana Mullen in detail.      I discussed limitations of the technology of fetal echocardiography with Ms. Archana Mullen in detail.  I explained that fetal echocardiography cannot exclude all forms of congenital heart disease.  Fetal echocardiography may be insensitive to some defects of atrial and ventricular septation, minor valvular abnormalities, partial anomalous pulmonary venous return, and coarctation of the aorta.  In addition, normal fetal echocardiogram does not ensure that the fetal ductus arteriosus or foramen ovale will close.      Recommendations:  No changes to prenatal care.    Further fetal cardiac imaging is not required at this time.  We would be happy to see Ms. Archana Mullen in the future if new concerns  arise.    Triage code 0:        No changes to delivery planning.  Delivery per obstetrics at patient´s preferred hospital.  Standard  care per  team.        No pediatric cardiology consult needed after birth unless there are concerns/issues.    I spent greater than 60 minutes in performance of this consultation, of which greater than 50% was related to coordination of care or counseling.    This assessment and plan, in addition to the results of relevant testing were explained to Archana. All questions were answered and understanding was demonstrated.    It was a pleasure to see MsVerónica Archana SARABJIT Mullen today.  If you have any questions or concerns regarding this evaluation, do not hesitate to contact me.      Clarita Taylor MD, FACC, FAAP   of Pediatrics  Division of Pediatric Cardiology  [facility] [place] [address]  Phone: [phone]  Fax: [phone]  e-mail: [email or web address]

## 2024-08-09 NOTE — PATIENT INSTRUCTIONS
On fetal echocardiogram today the structure, size, function (squeeze), and rhythm of your fetus' heart were normal.  There are some limitations to fetal echocardiogram as described below, and because it is not a perfect test it is important to know that we cannot rule out all possible heart problems (see below).  We will communicate these results with your obstetrician.    It was a pleasure to see you today.  We do not need to see you back for routine follow-up during the remainder of your pregnancy.  However, we would be happy to see you back if new issues or concerns arise.  After birth your baby does not need to see a cardiologist unless the pediatric team has concerns.  If you have any questions or concerns regarding this evaluation, please do not hesitate to contact me.    Clarita Taylor MD   of Pediatrics  Division of Pediatric Cardiology  [place] [address]  Phone: [phone]  Fax: [phone]  e-mail: [email or web address]    [email or web address]    Normal Fetal Echocardiogram    Today you had an ultrasound of your fetus' heart (fetal echocardiogram).  Based on all of the pictures taken today, the heart appears normal and is developing as expected for this point in your pregnancy.   Therefore, no further testing is recommended at this time.    Despite today´s normal findings, it is impossible to rule out all heart problems before birth.  This is partially because the fetus´ heart is so small, and our machine´s technology can only see structures down to a certain size.  It is also because blood flow in a fetus is different and more complicated than blood flow after birth.    Briefly:  ° Fetuses get oxygen from the placenta instead of their lungs.  High-oxygen (red) blood from the placenta comes back to the right side of the fetus´  heart.  ° Red blood crosses to the left side of the heart through a hole between the upper chambers of the heart, the foramen ovale.  The foramen ovale lets the red blood flow to the body.  ° Low-oxygen (blue) blood stays on the right side of the heart.  After leaving the heart, the blue blood flows through a special blood vessel known as the ductus arteriosus.  The ductus arteriosus allows the blue blood to bypass the lungs and return to the placenta to get oxygen.  ° After birth the foramen ovale and ductus arteriosus should close, but sometimes they do not.  It is impossible to predict in which children these structures will remain open.    ° Thus, on fetal echo we cannot rule out that your baby will have a patent foramen ovale (PFO) or patent ductus arteriosus (PDA) after birth.    In addition, fetal echocardiography can miss other heart defects including:  ° Small or medium-sized holes between the upper or lower heart chambers.  ° Minor abnormalities of the heart valves.  ° Narrowing of the main artery that goes to the body (coarctation of the aorta).  ° Other rare abnormalities of the veins or arteries.    If any of these defects are present, there should be findings after birth that will alert your child´s doctor that there is a problem and prompt further evaluation.  After delivery, if your pediatrician has any concerns, your child's heart can be reevaluated at that time.

## 2024-08-09 NOTE — PROGRESS NOTES
[facility]  [facility]  Division of Pediatric Cardiology  [facility] [place]   [address] [place] [address], [address], [place] [address]  Tel: [phone], Fax: [phone]      Obstetrician: Dr. Yariel Waddell    Archana Mullen was seen at the request of Dr. Elina Colby for incomplete cardiac views on obstetric ultrasound and conception via in vitro fertilization.  Records were reviewed, and a summary of those records is integrated within the history of present illness.  A report with my findings is being sent via written or electronic means to the referring physician with my recommendations.    Accompanied by: spouse  History obtained from: spouse    History of Presentation   History of Present Illness:   Archana Mullen is a 34 y.o. female presenting for initial prenatal cardiology consultation and fetal echocardiogram for incomplete cardiac views on obstetric ultrasound and conception via in vitro fertilization.  She is  and approximately 33w1d weeks pregnant (No LMP recorded (lmp unknown). Patient is pregnant., Estimated Date of Delivery: 24) with an undisclosed gender fetus.  Her obstetric history is significant for one miscarriage.  Complications of her current pregnancy include advanced maternal age, gestational diabetes, and fetal anomalies detected on obstetric ultrasound. She is on insulin to manage her blood sugars.  Ms. Archana Mullen had a normal first trimester screen.  Her level 2 obstetric ultrasound for anatomy was performed at 20 weeks gestational age which was normal. She had a repeat ultrasound at 31 weeks that showed a single umbilical artery. She has undergone cell free fetal DNA testing and it was normal.  She has not had invasive genetic testing such as amniocentesis or chorionic villous sampling.  This pregnancy was the result of in vitro fertilization.  She was not  using potentially teratogenic medication at the time of conception.  There have been no other complications of this pregnancy.  Ms. Archana Mullen plans to deliver her baby at [facility].     Ms. Archana Mullen feels well today.  She denies any shortness of breath, abdominal cramping, contractions, bleeding, or swelling of the extremities.  She notes frequent fetal movement.      Medical History     Medical Conditions:  Patient Active Problem List   Diagnosis    Attention deficit hyperactivity disorder (ADHD), combined type, moderate    Conversion disorder with seizures or convulsions    Maternal Crohn's disease affecting pregnancy, antepartum (Multi)    Genetic predisposition to breast cancer    IBS (irritable bowel syndrome)    Migraine with aura and without status migrainosus, not intractable    Anxiety and depression    Carrier of high risk cancer gene mutation    Pregnancy resulting from assisted reproductive technology, antepartum ([facility])    Advanced maternal age, primigravida, antepartum ([facility])    Gestational diabetes mellitus (GDM), antepartum ([facility])    Increased nuchal translucency space on fetal ultrasound    Maternal hypotension syndrome in third trimester ([facility])    31 weeks gestation of pregnancy ([facility])     Past Surgeries:  Past Surgical History:   Procedure Laterality Date    APPENDECTOMY  04/20/2020    COLONOSCOPY      ESOPHAGOGASTRODUODENOSCOPY      OVARIAN CYST REMOVAL Right 08/18/2009    TONSILLECTOMY       Current Outpatient Medications   Medication Instructions    Autolet lancing device Test 4 times a day: fasting and 1 hour after breakfast/lunch/dinner    blood sugar diagnostic strip 1 strip, miscellaneous, 4 times daily, Please dispense strips compatible with patients meter    blood-glucose meter misc 1 kit, soft tissue injection, 4 times daily, Test and record values 4 times a day: fasting (upon waking) and 1 hour after breakfast/lunch/dinner (1 hour from first bite  "if meal finished within 30 minutes)    blood-glucose sensor (FreeStyle Taylor 3 Sensor) device 28-day supply of FreeStyle Taylor 3 sensors (2 sensors); use as directed    escitalopram (LEXAPRO) 10 mg, oral, Daily    isopropyl alcohoL 70 % towelette Test 4 times a day: fasting and 1 hour after breakfast/lunch/dinner    meclizine (ANTIVERT) 25 mg, oral, 3 times daily PRN    prenatal no115/iron/folic acid (PRENATAL 19 ORAL) 1 capsule, oral, Daily    scopolamine (Transderm-Scop) 1 mg over 3 days patch 3 day 1 patch, transdermal, Every 72 hours PRN    vedolizumab (ENTYVIO) 300 mg, intravenous, Once, For Maintenance: every 8 weeks      Allergies:  Bupropion and Penicillin v    Social History:  Patient lives with spouse.  Occupation: Human resources  Smoking: None. Previous smoker.   Alcohol: None  Drug Use: None  She wears a seatbelt while in the car. She denies any verbal, sexual or physical abuse.     Cardiac Family History (for patient and father of baby):  There is no history of congenital heart disease.  There is no history of early sudden/unexplained death including SIDS and drowning.  There is no history of cardiomyopathy of any type or heart transplant.  There is no history of arrhythmias/pacemaker/defibrillator or arrhythmia syndromes, including Long QT syndrome, Brian-Parkinson-White syndrome or Brugada syndrome.  There is no history of heart attack or stroke before the age of 55 years in a close family member.  There is no history of Marfan syndrome or aortic aneurysm.  There is no history of deafness.  There is no history of syncope/fainting.  There is no history of high blood pressure or high cholesterol.  There is no history of DiGeorge Syndrome (22q11).    Physical Examination     /81 (BP Location: Left arm, Patient Position: Sitting, BP Cuff Size: Large adult)   Pulse 99   Ht 1.626 m (5' 4\")   Wt 98 kg (216 lb 0.8 oz)   LMP  (LMP Unknown)   BMI 37.09 kg/m²     General: Alert, well-appearing and in " no acute distress.    Abdomen: Soft, nontender, not distended. Gravid.  Extremities: No swelling or edema.  Neurologic / Psychiatric: Grossly intact without focal deficits.  Appropriate demeanor.      Results     Fetal Echocardiogram:    I ordered and interpreted a transabdominal fetal echocardiogram.  I performed a portion of the study myself.  The complete report is available under separate cover.  The results are summarized as follows:    Image quality: Good  Cardiac situs: Cardiac mass in the left chest.  Left ventricular apex points leftward.  Segmental anatomy: Atrio-ventricular concordance.  Ventriculo-arterial concordance.  Normally-related great arteries.  Superior vena cava: Normal connection to the right atrium.  Inferior vena cava: Normal connection to the right atrium.  Pulmonary veins: At least one pulmonary vein connects normally to the left atrium.  Atrial septum: Patent foramen ovale, with flap valve bowing into the left atrium.  Tricuspid valve: Structurally normal.  No obvious stenosis or insufficiency.  Mitral valve: Structurally normal.  No obvious stenosis or insufficiency.  Right ventricle: Normal ventricular size, wall thickness, and systolic function (qualitative).  Left ventricle: Normal ventricular size, wall thickness, and systolic function (qualitative).  Interventricular septum: No obvious septal defect.  Aortic valve: Structurally normal.  No obvious stenosis or insufficiency.  Pulmonary valve: Structurally normal.  No obvious stenosis or insufficiency.  Pulmonary artery: Normal in size.  Ductal arch: Patent with right to left shunting.  Aortic arch: Left-sided.  Patent.  Pericardial effusion: None.  Umbilical arteries: Two umbilical arteries.  Normal arterial flow pattern.  Umbilical vein: Normal venous flow pattern.  Electrophysiology: Normal fetal heart rate and rhythm.  Normal mechanical MO interval.      Assessment & Plan   Assessment:  Ms. Archana Mullen is a 34 y.o. female who  is  and currently at 33w1d weeks gestational age with an  undisclosed gender  fetus.  A fetal echocardiogram was performed today for conception via in vitro fertilization.     Fetal echocardiogram today demonstrated grossly normal fetal cardiac anatomy, qualitatively normal fetal cardiac function, normal fetal heart rhythm, and no evidence of in utero congestive heart failure or hydrops fetalis.  I reviewed the results of today's evaluation, including the findings of the fetal echocardiogram, with Ms. Archana Mullen in detail.      I discussed limitations of the technology of fetal echocardiography with Ms. Archana Mullen in detail.  I explained that fetal echocardiography cannot exclude all forms of congenital heart disease.  Fetal echocardiography may be insensitive to some defects of atrial and ventricular septation, minor valvular abnormalities, partial anomalous pulmonary venous return, and coarctation of the aorta.  In addition, normal fetal echocardiogram does not ensure that the fetal ductus arteriosus or foramen ovale will close.      Recommendations:  No changes to prenatal care.    Further fetal cardiac imaging is not required at this time.  We would be happy to see Ms. Archana Mullen in the future if new concerns arise.    Triage code 0:        No changes to delivery planning.  Delivery per obstetrics at patient´s preferred hospital.  Standard  care per  team.        No pediatric cardiology consult needed after birth unless there are concerns/issues.    I spent greater than 60 minutes in performance of this consultation, of which greater than 50% was related to coordination of care or counseling.    This assessment and plan, in addition to the results of relevant testing were explained to Archana. All questions were answered and understanding was demonstrated.    It was a pleasure to see Ms. Archana Mullen today.  If you have any questions or concerns regarding this evaluation, do  not hesitate to contact me.      Clarita Taylor MD, FACC, FAAP   of Pediatrics  Division of Pediatric Cardiology  [facility], [place] [address]  Phone: [phone]  Fax: [phone]  e-mail: [email or web address]

## 2024-08-12 ENCOUNTER — PATIENT MESSAGE (OUTPATIENT)
Dept: MATERNAL FETAL MEDICINE | Facility: HOSPITAL | Age: 34
End: 2024-08-12

## 2024-08-12 ENCOUNTER — APPOINTMENT (OUTPATIENT)
Dept: OBSTETRICS AND GYNECOLOGY | Facility: CLINIC | Age: 34
End: 2024-08-12
Payer: COMMERCIAL

## 2024-08-12 VITALS — DIASTOLIC BLOOD PRESSURE: 80 MMHG | SYSTOLIC BLOOD PRESSURE: 110 MMHG | WEIGHT: 216 LBS | BODY MASS INDEX: 37.08 KG/M2

## 2024-08-12 DIAGNOSIS — Z34.83 MULTIGRAVIDA IN THIRD TRIMESTER (HHS-HCC): Primary | ICD-10-CM

## 2024-08-12 DIAGNOSIS — Z23 NEED FOR TDAP VACCINATION: ICD-10-CM

## 2024-08-12 PROCEDURE — 0501F PRENATAL FLOW SHEET: CPT | Performed by: OBSTETRICS & GYNECOLOGY

## 2024-08-12 PROCEDURE — 90471 IMMUNIZATION ADMIN: CPT | Performed by: OBSTETRICS & GYNECOLOGY

## 2024-08-12 PROCEDURE — 90715 TDAP VACCINE 7 YRS/> IM: CPT | Performed by: OBSTETRICS & GYNECOLOGY

## 2024-08-12 NOTE — PROGRESS NOTES
Patient ID: Archana Mullen is a 34 y.o. female.    TDAP    Given 8/12/24  IM Left Deltoid  Lot# [id number]  Exp: 5/31/26  NDC: [id number]    Feels well.  Baby active.  Had fetal Echo  BS's controlled w diet  Has mfm appt 1 week w usn.  See me the week after for nst

## 2024-08-14 ENCOUNTER — HOSPITAL ENCOUNTER (OUTPATIENT)
Facility: HOSPITAL | Age: 34
Discharge: HOME | End: 2024-08-14
Attending: OBSTETRICS & GYNECOLOGY | Admitting: OBSTETRICS & GYNECOLOGY
Payer: COMMERCIAL

## 2024-08-14 VITALS
TEMPERATURE: 98.1 F | OXYGEN SATURATION: 97 % | RESPIRATION RATE: 18 BRPM | SYSTOLIC BLOOD PRESSURE: 120 MMHG | HEART RATE: 103 BPM | DIASTOLIC BLOOD PRESSURE: 81 MMHG

## 2024-08-14 PROBLEM — Z36.89 NST (NON-STRESS TEST) REACTIVE (HHS-HCC): Status: ACTIVE | Noted: 2024-08-14

## 2024-08-14 PROBLEM — O36.8130 DECREASED FETAL MOVEMENTS IN THIRD TRIMESTER (HHS-HCC): Status: ACTIVE | Noted: 2024-08-14

## 2024-08-14 PROCEDURE — 59025 FETAL NON-STRESS TEST: CPT | Performed by: STUDENT IN AN ORGANIZED HEALTH CARE EDUCATION/TRAINING PROGRAM

## 2024-08-14 PROCEDURE — 99214 OFFICE O/P EST MOD 30 MIN: CPT | Performed by: STUDENT IN AN ORGANIZED HEALTH CARE EDUCATION/TRAINING PROGRAM

## 2024-08-14 ASSESSMENT — PAIN SCALES - GENERAL
PAINLEVEL_OUTOF10: 0 - NO PAIN
PAINLEVEL_OUTOF10: 0 - NO PAIN

## 2024-08-15 NOTE — H&P
Obstetrical Admission History and Physical     Archana Mullen is a 34 y.o. .     Chief Complaint: Decreased Fetal Movement (Pt reports not feeling baby move since last night  at 10pm)    Assessment/Plan    Patient here for evaluation of decreased fetal movement at 33 weeks.  Nonstress test showed moderate variability with accelerations and no decelerations.  Transabdominal ultrasound showed a fetus in cephalic presentation with an DAVID of 16.  Patient states that she does feel the baby moving now and is reassured by the fetal heart rate monitoring and ultrasound evaluation..  Reviewed return precautions with patient.    I spent 30 minutes in the professional and overall care of this patient.    Assessment & Plan  Decreased fetal movements in third trimester ([facility])    NST (non-stress test) reactive ([facility])        Pregnancy #2 Problems (from 24 to present)       Problem Noted Resolved    Decreased fetal movements in third trimester ([facility]) 2024 by Shaka Richards MD No    Priority:  Medium      Suspected fetal abnormality affecting management of mother ([facility]) 2024 by Clarita Taylor MD No    Priority:  Medium      Maternal hypotension syndrome in third trimester ([facility]) 2024 by Bina Vazquez MD No    Priority:  Medium      31 weeks gestation of pregnancy ([facility]) 2024 by Bina Vazquez MD No    Priority:  Medium      Overview Signed 2024 12:50 PM by STEWART Wilson-CNP     -Primary OB: Dr. Waddell          Pregnancy resulting from assisted reproductive technology, antepartum ([facility]) 2024 by Alberto Walter MD No    Priority:  Medium      Overview Addendum 2024 12:45 PM by STEWART Wilson-CNP     US at 30 and 36w  Weekly NSTs at 34-35 wks   1 embryo in cryo, completed IVF at    Fetal echo discussed  and recommended especially in the setting of incomplete heart views (pt counseled while in ultrasound per Dr. Colby)           Advanced maternal age, primigravida, antepartum ([facility]) 2024 by Alberto Walter MD No    Priority:  Medium      Overview Signed 2024 12:50 PM by KATHERINE Wilson     -rr cfDNA   -Serial growth   -Weekly  testing at 34 wks if not initiated sooner          Gestational diabetes mellitus (GDM), antepartum ([facility]) 2024 by Alberto Walter MD No    Priority:  Medium      Overview Addendum 2024  1:34 PM by STEWART Mandujano-CNS     -Shared Care with   - Attended Boot Camp   - MFM Consult completed   -MFM 36 wk visit pending   -Serial growth ultrasounds starting at 28 weeks    Last ultrasound: : EFW 24%, AC 10%, 2VC, incomplete heart views (counseled in US per Dr. Colby re: new ultrasound findings    Fetal surveillance: if insulin initiated   -Weekly at 32 weeks  -Twice weekly at 36 weeks    Current Regimen: nutrition-> BG log reviewed and more than 80% within goal range  24 nutrition   2024 Nutrition plan    2024 Nutrition plan       Delivery Plan: pending 36 week follow up visit  Intrapartum:  GDM protocol  Postpartum: No medication    Recommend PP 2hr gtt and q3yr F/U with PCP for A1C and TSH           Maternal Crohn's disease affecting pregnancy, antepartum (Multi) 2023 by Carolin Ledezma CMA No    Priority:  Medium      Overview Addendum 2024 12:41 PM by ERICA WilsonCNP     Sees Dr. Angel Kirkland, GI  Entyvio 300 mg infusions every 8 weeks up until 34 weeks pregnant and then would hold infusions until after delivery   Last infusion --> plan for next infusion to be PP          12 weeks gestation of pregnancy ([facility]) 3/11/2024 by Aftab Swann,  2024 by Andreea John MA    Priority:  Medium            Subjective   Archana is here complaining of decreased fetal movement. Decreased fetal movement. Denies vaginal bleeding., Denies contractions., Denies leaking of fluid.           Obstetrical  History   OB History    Para Term  AB Living   2 0 0 0 1 0   SAB IAB Ectopic Multiple Live Births   1 0 0 0 0      # Outcome Date GA Lbr Silver/2nd Weight Sex Type Anes PTL Lv   2 Current            1 SAB 10/2023               Past Medical History  Past Medical History:   Diagnosis Date    12 weeks gestation of pregnancy ([facility]) 2024    Anxiety disorder, unspecified 2022    Anxiety    Chronic migraine without aura, intractable, without status migrainosus 2022    Intractable chronic migraine without aura and without status migrainosus    Convulsion, non-epileptic (Multi)     evaluated at     Crohn's disease of small intestine without complications (Multi) 2022    Crohn's disease of ileum without complication    Other urticaria 2021    Chronic urticaria    Papillomavirus as the cause of diseases classified elsewhere     HPV in female    Personal history of other diseases of the female genital tract     History of abnormal cervical Papanicolaou smear        Past Surgical History   Past Surgical History:   Procedure Laterality Date    APPENDECTOMY  2020    COLONOSCOPY      ESOPHAGOGASTRODUODENOSCOPY      OVARIAN CYST REMOVAL Right 2009    TONSILLECTOMY         Social History  Social History     Tobacco Use    Smoking status: Former     Types: Cigarettes     Passive exposure: Past    Smokeless tobacco: Never   Substance Use Topics    Alcohol use: Not Currently     Substance and Sexual Activity   Drug Use Never       Allergies  Bupropion and Penicillin v     Medications  No medications prior to admission.       Objective    Last Vitals  Temp Pulse Resp BP MAP O2 Sat   36.7 °C (98.1 °F) 103 18 120/81 96 97 %     Physical Examination  GENERAL: Examination reveals a well developed, well nourished, gravid female in no acute distress. She is alert and cooperative.  LUNGS: clear to auscultation bilaterally and unlabored  ABDOMEN: soft, gravid, nontender, nondistended,  no abnormal masses, no epigastric pain  FHR is 140 with moderate variability , with Accelerations, and a reactive  tracing.  No decelerations  [place] reading:  None  The fetus is in a vertex presentation, determined by ultrasound and DAVID 16  CERVIX: not evaluated; MEMBRANES are      Lab Review  Labs in chart were reviewed.

## 2024-08-20 ENCOUNTER — ROUTINE PRENATAL (OUTPATIENT)
Dept: MATERNAL FETAL MEDICINE | Facility: CLINIC | Age: 34
End: 2024-08-20
Payer: COMMERCIAL

## 2024-08-20 ENCOUNTER — HOSPITAL ENCOUNTER (OUTPATIENT)
Dept: RADIOLOGY | Facility: CLINIC | Age: 34
Discharge: HOME | End: 2024-08-20
Payer: COMMERCIAL

## 2024-08-20 VITALS — BODY MASS INDEX: 37.42 KG/M2 | SYSTOLIC BLOOD PRESSURE: 117 MMHG | DIASTOLIC BLOOD PRESSURE: 80 MMHG | WEIGHT: 218 LBS

## 2024-08-20 DIAGNOSIS — O99.619 MATERNAL CROHN'S DISEASE AFFECTING PREGNANCY, ANTEPARTUM (MULTI): ICD-10-CM

## 2024-08-20 DIAGNOSIS — Z3A.34 34 WEEKS GESTATION OF PREGNANCY (HHS-HCC): Primary | ICD-10-CM

## 2024-08-20 DIAGNOSIS — O09.519 ADVANCED MATERNAL AGE, PRIMIGRAVIDA, ANTEPARTUM (HHS-HCC): ICD-10-CM

## 2024-08-20 DIAGNOSIS — K50.90 MATERNAL CROHN'S DISEASE AFFECTING PREGNANCY, ANTEPARTUM (MULTI): ICD-10-CM

## 2024-08-20 DIAGNOSIS — O09.819 PREGNANCY RESULTING FROM ASSISTED REPRODUCTIVE TECHNOLOGY, ANTEPARTUM (HHS-HCC): ICD-10-CM

## 2024-08-20 DIAGNOSIS — O24.410 DIET CONTROLLED GESTATIONAL DIABETES MELLITUS (GDM), ANTEPARTUM (HHS-HCC): ICD-10-CM

## 2024-08-20 DIAGNOSIS — O24.410 DIET CONTROLLED GESTATIONAL DIABETES MELLITUS (GDM) IN THIRD TRIMESTER (HHS-HCC): ICD-10-CM

## 2024-08-20 PROCEDURE — 76819 FETAL BIOPHYS PROFIL W/O NST: CPT

## 2024-08-20 PROCEDURE — 99214 OFFICE O/P EST MOD 30 MIN: CPT | Mod: 25 | Performed by: NURSE PRACTITIONER

## 2024-08-20 PROCEDURE — 76816 OB US FOLLOW-UP PER FETUS: CPT | Performed by: OBSTETRICS & GYNECOLOGY

## 2024-08-20 PROCEDURE — 76816 OB US FOLLOW-UP PER FETUS: CPT

## 2024-08-20 PROCEDURE — 76819 FETAL BIOPHYS PROFIL W/O NST: CPT | Performed by: OBSTETRICS & GYNECOLOGY

## 2024-08-20 NOTE — PROGRESS NOTES
Archana Mullen is a 34 y.o. here for MFM F/U at 34w5d for GDM, referred by Dr. Waddell     Overall pt reports, she is feeling well. Feeling +FM. Denies VB, LOF or CTXs.    Reviewed BG log. Well controlled with nutrition plan.        issues:  GDM -nutrition controlled    Crohns - on Entyvio- q8 wks --> last   IVF pregnancy - with [facility], 1 blastocyst in cryo   Anxiety/Depression        Visit Vitals  /80   Wt 98.9 kg (218 lb)   LMP  (LMP Unknown)   BMI 37.42 kg/m²   OB Status Pregnant   Smoking Status Former   BSA 2.11 m²      Gen-NAD  Cardiac- good peripheral perfusion  Respiratory- non-labored breathing  Abdomen- soft, non-tender, gravid   Extremities- symmetrical   Pelvic- deferred      Sono-EFW 23%, AC 29%--> see report for full details   :EFW 24%, AC 10%, 2V cord, low normal growth, DAVID wnl, BPP      Problem List Items Addressed This Visit          Pregnancy #2    34 weeks gestation of pregnancy ([facility]) - Primary    Overview     -Primary OB: Dr. Waddell          Advanced maternal age, primigravida, antepartum ([facility])    Overview     -rr cfDNA   -Serial growth   -Weekly  testing at 34 wks if not initiated sooner          Gestational diabetes mellitus (GDM), antepartum ([facility])    Overview     -Shared Care with   - Attended Boot Camp   - MFM Consult completed   -MFM 36 wk visit pending   -Serial growth ultrasounds starting at 28 weeks    Last ultrasound:  : EFW 23%, AC 29%, see report for full details   : EFW 24%, AC 10%, 2VC, incomplete heart views (counseled in US per Dr. Colby re: new ultrasound findings    Fetal surveillance:   -Weekly at 34 wks     Current Regimen: nutrition-> BG log reviewed and more than 80% within goal range  24 nutrition   2024 Nutrition plan    2024 Nutrition plan  24 Nutrition plan         Delivery Plan: 39 wks pending continued BG control on nutrition plan  Intrapartum:  GDM  protocol  Postpartum: No medication    Recommend PP 2hr gtt and q3yr F/U with PCP for A1C and TSH           Maternal Crohn's disease affecting pregnancy, antepartum (Multi)    Overview     Sees Dr. Angel Kirkland, GI  Entyvio 300 mg infusions every 8 weeks up until 34 weeks pregnant and then would hold infusions until after delivery   Last infusion --> plan for next infusion to be PP          Pregnancy resulting from assisted reproductive technology, antepartum ([facility])    Overview     US at 30 and 36w  Weekly NSTs at 34-35 wks   1 embryo in cryo, completed IVF at    Fetal echo discussed  and recommended especially in the setting of incomplete heart views (pt counseled while in ultrasound per Dr. Colby) --> completed  and WNL               Continued weekly communication with blood sugar line   Weekly  testing at 34 wks    Delivery recommended at 39 wks if BG levels remains well controlled with nutrition;  if there is a recommendation to begin insulin after 37 wks would plan to deliver vs initiating insulin    Mary Anne Diop, APRN-CNP  Senior NNAMDI  Maternal Fetal Medicine

## 2024-08-26 ENCOUNTER — APPOINTMENT (OUTPATIENT)
Dept: OBSTETRICS AND GYNECOLOGY | Facility: CLINIC | Age: 34
End: 2024-08-26
Payer: COMMERCIAL

## 2024-08-26 VITALS — BODY MASS INDEX: 37.93 KG/M2 | WEIGHT: 221 LBS | SYSTOLIC BLOOD PRESSURE: 130 MMHG | DIASTOLIC BLOOD PRESSURE: 80 MMHG

## 2024-08-26 DIAGNOSIS — O09.523 AMA (ADVANCED MATERNAL AGE) MULTIGRAVIDA 35+, THIRD TRIMESTER (HHS-HCC): ICD-10-CM

## 2024-08-26 DIAGNOSIS — Z3A.36 36 WEEKS GESTATION OF PREGNANCY (HHS-HCC): Primary | ICD-10-CM

## 2024-08-26 PROCEDURE — 59025 FETAL NON-STRESS TEST: CPT | Performed by: OBSTETRICS & GYNECOLOGY

## 2024-08-26 PROCEDURE — 0501F PRENATAL FLOW SHEET: CPT | Performed by: OBSTETRICS & GYNECOLOGY

## 2024-08-26 PROCEDURE — 87081 CULTURE SCREEN ONLY: CPT

## 2024-08-26 NOTE — PROGRESS NOTES
"C/O waking up with \"slippery\" liquid all over her legs, still feels like liquid is trickling out, feels a little crampy and some stomach tightness.  Trace Protein in urine today.    Baby active  Neg fern/ntz/pool  Cx ltc  Gbs obtaine    No evidence of rom    "

## 2024-08-26 NOTE — PROCEDURES
Archana Mullen, a  at 35w4d with an RACHEL of 2024, by Est. Date of Conception, was seen at [facility] for a nonstress test.    Non-Stress Test   Baseline Fetal Heart Rate for Non-Stress Test: 130 BPM  Variability in Waveform for Non-Stress Test: Moderate  Accelerations in Non-Stress Test: Yes  Decelerations in Non-Stress Test: None  Interpretation of Non-Stress Test   Interpretation of Non-Stress Test: Reactive

## 2024-08-27 ENCOUNTER — TELEPHONE (OUTPATIENT)
Dept: OBSTETRICS AND GYNECOLOGY | Facility: CLINIC | Age: 34
End: 2024-08-27
Payer: COMMERCIAL

## 2024-08-27 NOTE — TELEPHONE ENCOUNTER
Patient tested positive for covid, gave her  the appropriate instructions and what to look for - out of the ordinary cold symptoms. He asked you give Archana a call, as she is concerned still so close to delivery.

## 2024-08-28 ENCOUNTER — PATIENT MESSAGE (OUTPATIENT)
Dept: MATERNAL FETAL MEDICINE | Facility: CLINIC | Age: 34
End: 2024-08-28
Payer: COMMERCIAL

## 2024-08-28 DIAGNOSIS — U07.1 COVID: Primary | ICD-10-CM

## 2024-08-28 RX ORDER — NIRMATRELVIR AND RITONAVIR 300-100 MG
3 KIT ORAL 2 TIMES DAILY
Qty: 30 TABLET | Refills: 0 | Status: SHIPPED | OUTPATIENT
Start: 2024-08-28 | End: 2024-09-02

## 2024-08-29 LAB — GP B STREP GENITAL QL CULT: NORMAL

## 2024-09-03 ENCOUNTER — PATIENT MESSAGE (OUTPATIENT)
Dept: MATERNAL FETAL MEDICINE | Facility: HOSPITAL | Age: 34
End: 2024-09-03
Payer: COMMERCIAL

## 2024-09-04 ENCOUNTER — APPOINTMENT (OUTPATIENT)
Dept: OBSTETRICS AND GYNECOLOGY | Facility: CLINIC | Age: 34
End: 2024-09-04
Payer: COMMERCIAL

## 2024-09-04 VITALS — SYSTOLIC BLOOD PRESSURE: 118 MMHG | WEIGHT: 221 LBS | BODY MASS INDEX: 37.93 KG/M2 | DIASTOLIC BLOOD PRESSURE: 78 MMHG

## 2024-09-04 DIAGNOSIS — O09.523 AMA (ADVANCED MATERNAL AGE) MULTIGRAVIDA 35+, THIRD TRIMESTER (HHS-HCC): ICD-10-CM

## 2024-09-04 DIAGNOSIS — Z34.83 MULTIGRAVIDA IN THIRD TRIMESTER (HHS-HCC): ICD-10-CM

## 2024-09-04 DIAGNOSIS — Z3A.36 36 WEEKS GESTATION OF PREGNANCY (HHS-HCC): ICD-10-CM

## 2024-09-04 PROCEDURE — 59025 FETAL NON-STRESS TEST: CPT | Performed by: OBSTETRICS & GYNECOLOGY

## 2024-09-04 PROCEDURE — 0501F PRENATAL FLOW SHEET: CPT | Performed by: OBSTETRICS & GYNECOLOGY

## 2024-09-04 NOTE — PROGRESS NOTES
Yesterday, patient was having significant right-sided sciatic pain.  Had inquired about delivery prior to 39 weeks.  Discussed that, unless there are mitigating circumstances, typically would wait till 39 weeks for elective induction.  Patient is feeling better today with respect to her sciatica.    Discussed recommendation of induction at 39 weeks given advanced maternal age and IVF.  Patient will consider but also may opt for expectant management    NST reactive.   -hold oral hypoglycemics for now, given AIC 7.2, consider uptitrated metformin to 1000mg po bid on discharge  -start moderate HISS  -cc diet

## 2024-09-04 NOTE — PROCEDURES
Archana Mullen, a  at 36w6d with an RACHEL of 2024, by Est. Date of Conception, was seen at [facility] for a nonstress test.    Non-Stress Test   Baseline Fetal Heart Rate for Non-Stress Test: 130 BPM  Variability in Waveform for Non-Stress Test: Moderate  Accelerations in Non-Stress Test: Yes  Decelerations in Non-Stress Test: None  Interpretation of Non-Stress Test   Interpretation of Non-Stress Test: Reactive

## 2024-09-09 ENCOUNTER — APPOINTMENT (OUTPATIENT)
Dept: OBSTETRICS AND GYNECOLOGY | Facility: CLINIC | Age: 34
End: 2024-09-09
Payer: COMMERCIAL

## 2024-09-09 VITALS — BODY MASS INDEX: 38.96 KG/M2 | DIASTOLIC BLOOD PRESSURE: 80 MMHG | WEIGHT: 227 LBS | SYSTOLIC BLOOD PRESSURE: 130 MMHG

## 2024-09-09 DIAGNOSIS — Z3A.37 37 WEEKS GESTATION OF PREGNANCY (HHS-HCC): ICD-10-CM

## 2024-09-09 DIAGNOSIS — Z34.83 MULTIGRAVIDA IN THIRD TRIMESTER (HHS-HCC): Primary | ICD-10-CM

## 2024-09-09 PROBLEM — O26.53: Status: RESOLVED | Noted: 2024-07-16 | Resolved: 2024-09-09

## 2024-09-09 PROBLEM — O28.3 INCREASED NUCHAL TRANSLUCENCY SPACE ON FETAL ULTRASOUND: Status: RESOLVED | Noted: 2024-07-16 | Resolved: 2024-09-09

## 2024-09-09 PROCEDURE — 90471 IMMUNIZATION ADMIN: CPT | Performed by: OBSTETRICS & GYNECOLOGY

## 2024-09-09 PROCEDURE — 59025 FETAL NON-STRESS TEST: CPT | Performed by: OBSTETRICS & GYNECOLOGY

## 2024-09-09 PROCEDURE — 90656 IIV3 VACC NO PRSV 0.5 ML IM: CPT | Performed by: OBSTETRICS & GYNECOLOGY

## 2024-09-09 PROCEDURE — 0501F PRENATAL FLOW SHEET: CPT | Performed by: OBSTETRICS & GYNECOLOGY

## 2024-09-09 NOTE — PROGRESS NOTES
More crampiness, some ctx over last 2 d  Baby active  Nst reactive.    Cx closed/60/-2  ELG reviewed

## 2024-09-09 NOTE — PROGRESS NOTES
Patient ID: Archana Mullen is a 34 y.o. female.    Flu Vaccine    Given 9/9/24  IM Left Deltoid  Lot# [id number]  EXP: 6/30/25  NDC: [id number]

## 2024-09-09 NOTE — PROCEDURES
Archana Mullen, a  at 37w4d with an RACHEL of 2024, by Est. Date of Conception, was seen at [facility] for a nonstress test.    Non-Stress Test   Baseline Fetal Heart Rate for Non-Stress Test: 120 BPM  Variability in Waveform for Non-Stress Test: Moderate  Accelerations in Non-Stress Test: Yes  Decelerations in Non-Stress Test: None  Interpretation of Non-Stress Test   Interpretation of Non-Stress Test: Reactive

## 2024-09-10 ENCOUNTER — PATIENT MESSAGE (OUTPATIENT)
Dept: MATERNAL FETAL MEDICINE | Facility: HOSPITAL | Age: 34
End: 2024-09-10
Payer: COMMERCIAL

## 2024-09-16 ENCOUNTER — APPOINTMENT (OUTPATIENT)
Dept: OBSTETRICS AND GYNECOLOGY | Facility: CLINIC | Age: 34
End: 2024-09-16
Payer: COMMERCIAL

## 2024-09-16 VITALS — WEIGHT: 223 LBS | SYSTOLIC BLOOD PRESSURE: 120 MMHG | BODY MASS INDEX: 38.28 KG/M2 | DIASTOLIC BLOOD PRESSURE: 84 MMHG

## 2024-09-16 DIAGNOSIS — Z3A.38 38 WEEKS GESTATION OF PREGNANCY (HHS-HCC): ICD-10-CM

## 2024-09-16 DIAGNOSIS — Z34.83 MULTIGRAVIDA IN THIRD TRIMESTER (HHS-HCC): ICD-10-CM

## 2024-09-16 PROCEDURE — 59025 FETAL NON-STRESS TEST: CPT | Performed by: OBSTETRICS & GYNECOLOGY

## 2024-09-16 PROCEDURE — 0501F PRENATAL FLOW SHEET: CPT | Performed by: OBSTETRICS & GYNECOLOGY

## 2024-09-16 NOTE — PROCEDURES
Archana Mullen, a  at 38w4d with an RACHEL of 2024, by Est. Date of Conception, was seen at [facility] for a nonstress test.    Non-Stress Test   Baseline Fetal Heart Rate for Non-Stress Test: 130 BPM  Variability in Waveform for Non-Stress Test: Moderate  Accelerations in Non-Stress Test: Yes  Decelerations in Non-Stress Test: None  Interpretation of Non-Stress Test   Interpretation of Non-Stress Test: Reactive      Baby active.  Feels well.    NST reactive.  GDMa1 (growth AGA, EFW ~ 23%)  IVF preg  AMA    Disc these factors w pt and  and disc general recommendations for deliver at 39 weeks, including mfm rec.  Pt and  would like to proceed w IOL, 1st available time evening of  or after.  Spoke w scheduling and IOL set for 24 at 8 am

## 2024-09-16 NOTE — PROGRESS NOTES
Today cervix is closed/50%  Given diagnosis of advanced maternal age, IVF pregnancy, GDM A1,   Maternal-fetal medicine recommendation is for induction at 39 weeks.  Patient would not like done until after 9/20.  Schedulers have 9/23 available.  Induction scheduled for 8 AM.  See patient in 4 days for additional NST.

## 2024-09-19 ENCOUNTER — PATIENT MESSAGE (OUTPATIENT)
Dept: MATERNAL FETAL MEDICINE | Facility: CLINIC | Age: 34
End: 2024-09-19
Payer: COMMERCIAL

## 2024-09-19 DIAGNOSIS — F32.A ANXIETY AND DEPRESSION: ICD-10-CM

## 2024-09-19 DIAGNOSIS — F41.9 ANXIETY AND DEPRESSION: ICD-10-CM

## 2024-09-19 PROBLEM — F39 MOOD DISORDER (CMS-HCC): Status: ACTIVE | Noted: 2024-09-19

## 2024-09-19 RX ORDER — ESCITALOPRAM OXALATE 10 MG/1
10 TABLET ORAL DAILY
Qty: 90 TABLET | Refills: 0 | Status: SHIPPED | OUTPATIENT
Start: 2024-09-19 | End: 2024-12-18

## 2024-09-20 ENCOUNTER — ROUTINE PRENATAL (OUTPATIENT)
Dept: OBSTETRICS AND GYNECOLOGY | Facility: CLINIC | Age: 34
End: 2024-09-20
Payer: COMMERCIAL

## 2024-09-20 VITALS — WEIGHT: 221 LBS | SYSTOLIC BLOOD PRESSURE: 120 MMHG | BODY MASS INDEX: 37.93 KG/M2 | DIASTOLIC BLOOD PRESSURE: 78 MMHG

## 2024-09-20 DIAGNOSIS — O09.523 AMA (ADVANCED MATERNAL AGE) MULTIGRAVIDA 35+, THIRD TRIMESTER (HHS-HCC): ICD-10-CM

## 2024-09-20 DIAGNOSIS — Z34.83 MULTIGRAVIDA IN THIRD TRIMESTER (HHS-HCC): ICD-10-CM

## 2024-09-20 DIAGNOSIS — Z3A.39 39 WEEKS GESTATION OF PREGNANCY (HHS-HCC): ICD-10-CM

## 2024-09-20 PROCEDURE — 59025 FETAL NON-STRESS TEST: CPT | Performed by: OBSTETRICS & GYNECOLOGY

## 2024-09-20 PROCEDURE — 0501F PRENATAL FLOW SHEET: CPT | Performed by: OBSTETRICS & GYNECOLOGY

## 2024-09-20 NOTE — PROCEDURES
Archana Mullen, a  at 39w1d with an RACHEL of 2024, by Est. Date of Conception, was seen at [facility] for a nonstress test.    Non-Stress Test   Baseline Fetal Heart Rate for Non-Stress Test: 130 BPM  Variability in Waveform for Non-Stress Test: Moderate  Accelerations in Non-Stress Test: Yes  Decelerations in Non-Stress Test: None  Interpretation of Non-Stress Test   Interpretation of Non-Stress Test: Reactive

## 2024-09-23 ENCOUNTER — APPOINTMENT (OUTPATIENT)
Dept: OBSTETRICS AND GYNECOLOGY | Facility: HOSPITAL | Age: 34
End: 2024-09-23
Payer: COMMERCIAL

## 2024-09-23 ENCOUNTER — ANESTHESIA (OUTPATIENT)
Dept: OBSTETRICS AND GYNECOLOGY | Facility: HOSPITAL | Age: 34
End: 2024-09-23
Payer: COMMERCIAL

## 2024-09-23 ENCOUNTER — LAB REQUISITION (OUTPATIENT)
Dept: LAB | Facility: HOSPITAL | Age: 34
End: 2024-09-23
Payer: COMMERCIAL

## 2024-09-23 ENCOUNTER — ANESTHESIA EVENT (OUTPATIENT)
Dept: OBSTETRICS AND GYNECOLOGY | Facility: HOSPITAL | Age: 34
End: 2024-09-23
Payer: COMMERCIAL

## 2024-09-23 ENCOUNTER — HOSPITAL ENCOUNTER (OUTPATIENT)
Facility: HOSPITAL | Age: 34
Setting detail: OBSERVATION
Discharge: ANOTHER HOSPITAL | End: 2024-09-24
Attending: OBSTETRICS & GYNECOLOGY | Admitting: OBSTETRICS & GYNECOLOGY
Payer: COMMERCIAL

## 2024-09-23 DIAGNOSIS — Z3A.38 38 WEEKS GESTATION OF PREGNANCY (HHS-HCC): ICD-10-CM

## 2024-09-23 PROBLEM — O36.8130 DECREASED FETAL MOVEMENTS IN THIRD TRIMESTER (HHS-HCC): Status: RESOLVED | Noted: 2024-08-14 | Resolved: 2024-09-23

## 2024-09-23 PROBLEM — Z3A.34 34 WEEKS GESTATION OF PREGNANCY (HHS-HCC): Status: RESOLVED | Noted: 2024-07-16 | Resolved: 2024-09-23

## 2024-09-23 PROBLEM — Z34.90 ENCOUNTER FOR INDUCTION OF LABOR: Status: ACTIVE | Noted: 2024-09-23

## 2024-09-23 PROBLEM — O35.9XX0 SUSPECTED FETAL ABNORMALITY AFFECTING MANAGEMENT OF MOTHER (HHS-HCC): Status: RESOLVED | Noted: 2024-08-09 | Resolved: 2024-09-23

## 2024-09-23 LAB
ABO GROUP (TYPE) IN BLOOD: NORMAL
ALBUMIN SERPL BCP-MCNC: 3 G/DL (ref 3.4–5)
ALBUMIN SERPL BCP-MCNC: 3.2 G/DL (ref 3.4–5)
ALP SERPL-CCNC: 183 U/L (ref 33–110)
ALP SERPL-CCNC: 191 U/L (ref 33–110)
ALT SERPL W P-5'-P-CCNC: 13 U/L (ref 7–45)
ALT SERPL W P-5'-P-CCNC: 13 U/L (ref 7–45)
ANION GAP SERPL CALC-SCNC: 14 MMOL/L (ref 10–20)
ANION GAP SERPL CALC-SCNC: 17 MMOL/L (ref 10–20)
ANTIBODY SCREEN: NORMAL
AST SERPL W P-5'-P-CCNC: 45 U/L (ref 9–39)
AST SERPL W P-5'-P-CCNC: 48 U/L (ref 9–39)
BILIRUB SERPL-MCNC: 0.2 MG/DL (ref 0–1.2)
BILIRUB SERPL-MCNC: 0.2 MG/DL (ref 0–1.2)
BUN SERPL-MCNC: 14 MG/DL (ref 6–23)
BUN SERPL-MCNC: 15 MG/DL (ref 6–23)
CALCIUM SERPL-MCNC: 7.6 MG/DL (ref 8.6–10.3)
CALCIUM SERPL-MCNC: 8.3 MG/DL (ref 8.6–10.3)
CHLORIDE SERPL-SCNC: 104 MMOL/L (ref 98–107)
CHLORIDE SERPL-SCNC: 107 MMOL/L (ref 98–107)
CO2 SERPL-SCNC: 19 MMOL/L (ref 21–32)
CO2 SERPL-SCNC: 20 MMOL/L (ref 21–32)
CREAT SERPL-MCNC: 0.65 MG/DL (ref 0.5–1.05)
CREAT SERPL-MCNC: 0.74 MG/DL (ref 0.5–1.05)
CREAT UR-MCNC: 356.9 MG/DL (ref 20–320)
EGFRCR SERPLBLD CKD-EPI 2021: >90 ML/MIN/1.73M*2
EGFRCR SERPLBLD CKD-EPI 2021: >90 ML/MIN/1.73M*2
ERYTHROCYTE [DISTWIDTH] IN BLOOD BY AUTOMATED COUNT: 14.7 % (ref 11.5–14.5)
ERYTHROCYTE [DISTWIDTH] IN BLOOD BY AUTOMATED COUNT: 14.8 % (ref 11.5–14.5)
ERYTHROCYTE [DISTWIDTH] IN BLOOD BY AUTOMATED COUNT: 14.8 % (ref 11.5–14.5)
GLUCOSE BLD MANUAL STRIP-MCNC: 107 MG/DL (ref 74–99)
GLUCOSE BLD MANUAL STRIP-MCNC: 77 MG/DL (ref 74–99)
GLUCOSE BLD MANUAL STRIP-MCNC: 83 MG/DL (ref 74–99)
GLUCOSE BLD MANUAL STRIP-MCNC: 93 MG/DL (ref 74–99)
GLUCOSE SERPL-MCNC: 103 MG/DL (ref 74–99)
GLUCOSE SERPL-MCNC: 82 MG/DL (ref 74–99)
HCT VFR BLD AUTO: 33.7 % (ref 36–46)
HCT VFR BLD AUTO: 34.5 % (ref 36–46)
HCT VFR BLD AUTO: 34.8 % (ref 36–46)
HGB BLD-MCNC: 11.1 G/DL (ref 12–16)
HGB BLD-MCNC: 11.2 G/DL (ref 12–16)
HGB BLD-MCNC: 11.4 G/DL (ref 12–16)
LDH SERPL L TO P-CCNC: 220 U/L (ref 84–246)
MCH RBC QN AUTO: 31 PG (ref 26–34)
MCH RBC QN AUTO: 31.4 PG (ref 26–34)
MCH RBC QN AUTO: 31.5 PG (ref 26–34)
MCHC RBC AUTO-ENTMCNC: 32.2 G/DL (ref 32–36)
MCHC RBC AUTO-ENTMCNC: 32.9 G/DL (ref 32–36)
MCHC RBC AUTO-ENTMCNC: 33 G/DL (ref 32–36)
MCV RBC AUTO: 95 FL (ref 80–100)
MCV RBC AUTO: 95 FL (ref 80–100)
MCV RBC AUTO: 96 FL (ref 80–100)
NRBC BLD-RTO: 0 /100 WBCS (ref 0–0)
PLATELET # BLD AUTO: 145 X10*3/UL (ref 150–450)
PLATELET # BLD AUTO: 147 X10*3/UL (ref 150–450)
PLATELET # BLD AUTO: 150 X10*3/UL (ref 150–450)
POTASSIUM SERPL-SCNC: 4 MMOL/L (ref 3.5–5.3)
POTASSIUM SERPL-SCNC: 4.2 MMOL/L (ref 3.5–5.3)
PROT SERPL-MCNC: 5.3 G/DL (ref 6.4–8.2)
PROT SERPL-MCNC: 5.4 G/DL (ref 6.4–8.2)
PROT UR-ACNC: 796 MG/DL (ref 5–24)
PROT/CREAT UR: 2.23 MG/MG CREAT (ref 0–0.17)
RBC # BLD AUTO: 3.54 X10*6/UL (ref 4–5.2)
RBC # BLD AUTO: 3.61 X10*6/UL (ref 4–5.2)
RBC # BLD AUTO: 3.62 X10*6/UL (ref 4–5.2)
RH FACTOR (ANTIGEN D): NORMAL
SODIUM SERPL-SCNC: 134 MMOL/L (ref 136–145)
SODIUM SERPL-SCNC: 139 MMOL/L (ref 136–145)
TREPONEMA PALLIDUM IGG+IGM AB [PRESENCE] IN SERUM OR PLASMA BY IMMUNOASSAY: NONREACTIVE
WBC # BLD AUTO: 8.1 X10*3/UL (ref 4.4–11.3)
WBC # BLD AUTO: 8.3 X10*3/UL (ref 4.4–11.3)
WBC # BLD AUTO: 8.8 X10*3/UL (ref 4.4–11.3)

## 2024-09-23 PROCEDURE — 86900 BLOOD TYPING SEROLOGIC ABO: CPT

## 2024-09-23 PROCEDURE — 36415 COLL VENOUS BLD VENIPUNCTURE: CPT | Performed by: OBSTETRICS & GYNECOLOGY

## 2024-09-23 PROCEDURE — G0378 HOSPITAL OBSERVATION PER HR: HCPCS

## 2024-09-23 PROCEDURE — 59050 FETAL MONITOR W/REPORT: CPT

## 2024-09-23 PROCEDURE — 85027 COMPLETE CBC AUTOMATED: CPT | Performed by: MIDWIFE

## 2024-09-23 PROCEDURE — 7210000002 HC LABOR PER HOUR

## 2024-09-23 PROCEDURE — 86850 RBC ANTIBODY SCREEN: CPT

## 2024-09-23 PROCEDURE — 96374 THER/PROPH/DIAG INJ IV PUSH: CPT

## 2024-09-23 PROCEDURE — 80053 COMPREHEN METABOLIC PANEL: CPT | Performed by: OBSTETRICS & GYNECOLOGY

## 2024-09-23 PROCEDURE — 86901 BLOOD TYPING SEROLOGIC RH(D): CPT

## 2024-09-23 PROCEDURE — 99199 UNLISTED SPECIAL SVC PX/RPRT: CPT

## 2024-09-23 PROCEDURE — 2500000001 HC RX 250 WO HCPCS SELF ADMINISTERED DRUGS (ALT 637 FOR MEDICARE OP): Performed by: OBSTETRICS & GYNECOLOGY

## 2024-09-23 PROCEDURE — 82947 ASSAY GLUCOSE BLOOD QUANT: CPT

## 2024-09-23 PROCEDURE — 85027 COMPLETE CBC AUTOMATED: CPT | Performed by: OBSTETRICS & GYNECOLOGY

## 2024-09-23 PROCEDURE — 96375 TX/PRO/DX INJ NEW DRUG ADDON: CPT

## 2024-09-23 PROCEDURE — 36415 COLL VENOUS BLD VENIPUNCTURE: CPT | Performed by: MIDWIFE

## 2024-09-23 PROCEDURE — 83615 LACTATE (LD) (LDH) ENZYME: CPT | Performed by: OBSTETRICS & GYNECOLOGY

## 2024-09-23 PROCEDURE — 2500000004 HC RX 250 GENERAL PHARMACY W/ HCPCS (ALT 636 FOR OP/ED): Performed by: MIDWIFE

## 2024-09-23 PROCEDURE — 82570 ASSAY OF URINE CREATININE: CPT | Performed by: OBSTETRICS & GYNECOLOGY

## 2024-09-23 PROCEDURE — 2500000001 HC RX 250 WO HCPCS SELF ADMINISTERED DRUGS (ALT 637 FOR MEDICARE OP): Performed by: MIDWIFE

## 2024-09-23 PROCEDURE — 2500000004 HC RX 250 GENERAL PHARMACY W/ HCPCS (ALT 636 FOR OP/ED): Performed by: OBSTETRICS & GYNECOLOGY

## 2024-09-23 PROCEDURE — 86780 TREPONEMA PALLIDUM: CPT | Mod: AHULAB | Performed by: MIDWIFE

## 2024-09-23 PROCEDURE — 96376 TX/PRO/DX INJ SAME DRUG ADON: CPT

## 2024-09-23 RX ORDER — NIFEDIPINE 10 MG/1
10 CAPSULE ORAL ONCE AS NEEDED
Status: DISCONTINUED | OUTPATIENT
Start: 2024-09-23 | End: 2024-09-24 | Stop reason: HOSPADM

## 2024-09-23 RX ORDER — FENTANYL/ROPIVACAINE/NS/PF 2MCG/ML-.2
0-25 PLASTIC BAG, INJECTION (ML) INJECTION CONTINUOUS
Status: CANCELLED | OUTPATIENT
Start: 2024-09-23

## 2024-09-23 RX ORDER — ONDANSETRON 4 MG/1
4 TABLET, FILM COATED ORAL EVERY 6 HOURS PRN
Status: DISCONTINUED | OUTPATIENT
Start: 2024-09-23 | End: 2024-09-24 | Stop reason: HOSPADM

## 2024-09-23 RX ORDER — HYDRALAZINE HYDROCHLORIDE 20 MG/ML
10 INJECTION INTRAMUSCULAR; INTRAVENOUS ONCE AS NEEDED
Status: DISCONTINUED | OUTPATIENT
Start: 2024-09-23 | End: 2024-09-24 | Stop reason: HOSPADM

## 2024-09-23 RX ORDER — LOPERAMIDE HYDROCHLORIDE 2 MG/1
4 CAPSULE ORAL EVERY 2 HOUR PRN
Status: DISCONTINUED | OUTPATIENT
Start: 2024-09-23 | End: 2024-09-24 | Stop reason: HOSPADM

## 2024-09-23 RX ORDER — HYDRALAZINE HYDROCHLORIDE 20 MG/ML
5 INJECTION INTRAMUSCULAR; INTRAVENOUS ONCE AS NEEDED
Status: DISCONTINUED | OUTPATIENT
Start: 2024-09-23 | End: 2024-09-24 | Stop reason: HOSPADM

## 2024-09-23 RX ORDER — LABETALOL HYDROCHLORIDE 5 MG/ML
40 INJECTION, SOLUTION INTRAVENOUS ONCE AS NEEDED
Status: DISCONTINUED | OUTPATIENT
Start: 2024-09-23 | End: 2024-09-24 | Stop reason: HOSPADM

## 2024-09-23 RX ORDER — METOCLOPRAMIDE HYDROCHLORIDE 5 MG/ML
10 INJECTION INTRAMUSCULAR; INTRAVENOUS EVERY 6 HOURS PRN
Status: DISCONTINUED | OUTPATIENT
Start: 2024-09-23 | End: 2024-09-24 | Stop reason: HOSPADM

## 2024-09-23 RX ORDER — LIDOCAINE HYDROCHLORIDE 10 MG/ML
30 INJECTION, SOLUTION INFILTRATION; PERINEURAL ONCE AS NEEDED
Status: DISCONTINUED | OUTPATIENT
Start: 2024-09-23 | End: 2024-09-24 | Stop reason: HOSPADM

## 2024-09-23 RX ORDER — SODIUM CHLORIDE, SODIUM LACTATE, POTASSIUM CHLORIDE, CALCIUM CHLORIDE 600; 310; 30; 20 MG/100ML; MG/100ML; MG/100ML; MG/100ML
125 INJECTION, SOLUTION INTRAVENOUS CONTINUOUS
Status: DISCONTINUED | OUTPATIENT
Start: 2024-09-23 | End: 2024-09-24 | Stop reason: HOSPADM

## 2024-09-23 RX ORDER — TERBUTALINE SULFATE 1 MG/ML
0.25 INJECTION SUBCUTANEOUS ONCE AS NEEDED
Status: DISCONTINUED | OUTPATIENT
Start: 2024-09-23 | End: 2024-09-24 | Stop reason: HOSPADM

## 2024-09-23 RX ORDER — MISOPROSTOL 200 UG/1
800 TABLET ORAL ONCE AS NEEDED
Status: DISCONTINUED | OUTPATIENT
Start: 2024-09-23 | End: 2024-09-24 | Stop reason: HOSPADM

## 2024-09-23 RX ORDER — CARBOPROST TROMETHAMINE 250 UG/ML
250 INJECTION, SOLUTION INTRAMUSCULAR ONCE AS NEEDED
Status: DISCONTINUED | OUTPATIENT
Start: 2024-09-23 | End: 2024-09-24 | Stop reason: HOSPADM

## 2024-09-23 RX ORDER — MAGNESIUM SULFATE HEPTAHYDRATE 40 MG/ML
2 INJECTION, SOLUTION INTRAVENOUS CONTINUOUS
Status: DISCONTINUED | OUTPATIENT
Start: 2024-09-23 | End: 2024-09-24 | Stop reason: HOSPADM

## 2024-09-23 RX ORDER — OXYTOCIN 10 [USP'U]/ML
10 INJECTION, SOLUTION INTRAMUSCULAR; INTRAVENOUS ONCE AS NEEDED
Status: DISCONTINUED | OUTPATIENT
Start: 2024-09-23 | End: 2024-09-24 | Stop reason: HOSPADM

## 2024-09-23 RX ORDER — ACETAMINOPHEN 325 MG/1
975 TABLET ORAL EVERY 6 HOURS PRN
Status: DISCONTINUED | OUTPATIENT
Start: 2024-09-23 | End: 2024-09-24 | Stop reason: HOSPADM

## 2024-09-23 RX ORDER — METHYLERGONOVINE MALEATE 0.2 MG/ML
0.2 INJECTION INTRAVENOUS ONCE AS NEEDED
Status: DISCONTINUED | OUTPATIENT
Start: 2024-09-23 | End: 2024-09-24 | Stop reason: HOSPADM

## 2024-09-23 RX ORDER — ONDANSETRON HYDROCHLORIDE 2 MG/ML
4 INJECTION, SOLUTION INTRAVENOUS EVERY 6 HOURS PRN
Status: DISCONTINUED | OUTPATIENT
Start: 2024-09-23 | End: 2024-09-24 | Stop reason: HOSPADM

## 2024-09-23 RX ORDER — DIPHENHYDRAMINE HYDROCHLORIDE 50 MG/ML
25 INJECTION INTRAMUSCULAR; INTRAVENOUS EVERY 6 HOURS PRN
Status: DISCONTINUED | OUTPATIENT
Start: 2024-09-23 | End: 2024-09-24 | Stop reason: HOSPADM

## 2024-09-23 RX ORDER — CALCIUM GLUCONATE 98 MG/ML
1 INJECTION, SOLUTION INTRAVENOUS ONCE AS NEEDED
Status: DISCONTINUED | OUTPATIENT
Start: 2024-09-23 | End: 2024-09-24 | Stop reason: HOSPADM

## 2024-09-23 RX ORDER — TRANEXAMIC ACID 100 MG/ML
1000 INJECTION, SOLUTION INTRAVENOUS ONCE AS NEEDED
Status: DISCONTINUED | OUTPATIENT
Start: 2024-09-23 | End: 2024-09-24 | Stop reason: HOSPADM

## 2024-09-23 RX ORDER — METOCLOPRAMIDE 10 MG/1
10 TABLET ORAL EVERY 6 HOURS PRN
Status: DISCONTINUED | OUTPATIENT
Start: 2024-09-23 | End: 2024-09-24 | Stop reason: HOSPADM

## 2024-09-23 RX ORDER — OXYTOCIN/0.9 % SODIUM CHLORIDE 30/500 ML
60 PLASTIC BAG, INJECTION (ML) INTRAVENOUS ONCE AS NEEDED
Status: DISCONTINUED | OUTPATIENT
Start: 2024-09-23 | End: 2024-09-24 | Stop reason: HOSPADM

## 2024-09-23 RX ORDER — LABETALOL HYDROCHLORIDE 5 MG/ML
20 INJECTION, SOLUTION INTRAVENOUS ONCE AS NEEDED
Status: COMPLETED | OUTPATIENT
Start: 2024-09-23 | End: 2024-09-23

## 2024-09-23 RX ORDER — LABETALOL HYDROCHLORIDE 5 MG/ML
40 INJECTION, SOLUTION INTRAVENOUS ONCE
Status: COMPLETED | OUTPATIENT
Start: 2024-09-23 | End: 2024-09-23

## 2024-09-23 RX ORDER — ESCITALOPRAM OXALATE 20 MG/1
10 TABLET ORAL NIGHTLY
Status: DISCONTINUED | OUTPATIENT
Start: 2024-09-23 | End: 2024-09-24 | Stop reason: HOSPADM

## 2024-09-23 RX ORDER — LABETALOL HYDROCHLORIDE 5 MG/ML
80 INJECTION, SOLUTION INTRAVENOUS ONCE AS NEEDED
Status: DISCONTINUED | OUTPATIENT
Start: 2024-09-23 | End: 2024-09-24 | Stop reason: HOSPADM

## 2024-09-23 RX ORDER — ESCITALOPRAM OXALATE 20 MG/1
10 TABLET ORAL DAILY
Status: DISCONTINUED | OUTPATIENT
Start: 2024-09-23 | End: 2024-09-23

## 2024-09-23 SDOH — ECONOMIC STABILITY: HOUSING INSECURITY: DO YOU FEEL UNSAFE GOING BACK TO THE PLACE WHERE YOU ARE LIVING?: NO

## 2024-09-23 SDOH — HEALTH STABILITY: MENTAL HEALTH: HAVE YOU USED ANY SUBSTANCES (CANABIS, COCAINE, HEROIN, HALLUCINOGENS, INHALANTS, ETC.) IN THE PAST 12 MONTHS?: NO

## 2024-09-23 SDOH — SOCIAL STABILITY: SOCIAL INSECURITY: HAVE YOU HAD ANY THOUGHTS OF HARMING ANYONE ELSE?: NO

## 2024-09-23 SDOH — HEALTH STABILITY: MENTAL HEALTH
STRENGTHS (MUST CHOOSE TWO): SUPPORT FROM ORGANIZED COMMUNITY;SENSE OF HUMOR;MOTIVATION LEVEL FOR TREATMENT;STABLE DOMESTIC SITUATION;SUPPORT FROM FAMILY;SUPPORT FROM FRIENDS

## 2024-09-23 SDOH — SOCIAL STABILITY: SOCIAL INSECURITY: DOES ANYONE TRY TO KEEP YOU FROM HAVING/CONTACTING OTHER FRIENDS OR DOING THINGS OUTSIDE YOUR HOME?: NO

## 2024-09-23 SDOH — SOCIAL STABILITY: SOCIAL INSECURITY: PHYSICAL ABUSE: DENIES

## 2024-09-23 SDOH — HEALTH STABILITY: MENTAL HEALTH: NON-SPECIFIC ACTIVE SUICIDAL THOUGHTS (PAST 1 MONTH): NO

## 2024-09-23 SDOH — HEALTH STABILITY: MENTAL HEALTH: HAVE YOU USED ANY PRESCRIPTION DRUGS OTHER THAN PRESCRIBED IN THE PAST 12 MONTHS?: NO

## 2024-09-23 SDOH — HEALTH STABILITY: MENTAL HEALTH: SUICIDAL BEHAVIOR (LIFETIME): NO

## 2024-09-23 SDOH — SOCIAL STABILITY: SOCIAL INSECURITY: VERBAL ABUSE: DENIES

## 2024-09-23 SDOH — SOCIAL STABILITY: SOCIAL INSECURITY: ARE YOU OR HAVE YOU BEEN THREATENED OR ABUSED PHYSICALLY, EMOTIONALLY, OR SEXUALLY BY ANYONE?: NO

## 2024-09-23 SDOH — SOCIAL STABILITY: SOCIAL INSECURITY: ABUSE SCREEN: ADULT

## 2024-09-23 SDOH — HEALTH STABILITY: MENTAL HEALTH: CURRENT SMOKER: 0

## 2024-09-23 SDOH — HEALTH STABILITY: MENTAL HEALTH: WERE YOU ABLE TO COMPLETE ALL THE BEHAVIORAL HEALTH SCREENINGS?: YES

## 2024-09-23 SDOH — SOCIAL STABILITY: SOCIAL INSECURITY: HAS ANYONE EVER THREATENED TO HURT YOUR FAMILY OR YOUR PETS?: NO

## 2024-09-23 SDOH — SOCIAL STABILITY: SOCIAL INSECURITY: ARE THERE ANY APPARENT SIGNS OF INJURIES/BEHAVIORS THAT COULD BE RELATED TO ABUSE/NEGLECT?: NO

## 2024-09-23 SDOH — SOCIAL STABILITY: SOCIAL INSECURITY: HAVE YOU HAD THOUGHTS OF HARMING ANYONE ELSE?: NO

## 2024-09-23 SDOH — HEALTH STABILITY: MENTAL HEALTH: WISH TO BE DEAD (PAST 1 MONTH): NO

## 2024-09-23 SDOH — SOCIAL STABILITY: SOCIAL INSECURITY: DO YOU FEEL ANYONE HAS EXPLOITED OR TAKEN ADVANTAGE OF YOU FINANCIALLY OR OF YOUR PERSONAL PROPERTY?: NO

## 2024-09-23 ASSESSMENT — LIFESTYLE VARIABLES
HOW OFTEN DO YOU HAVE 6 OR MORE DRINKS ON ONE OCCASION: NEVER
AUDIT-C TOTAL SCORE: 0
HOW OFTEN DO YOU HAVE A DRINK CONTAINING ALCOHOL: NEVER
HOW MANY STANDARD DRINKS CONTAINING ALCOHOL DO YOU HAVE ON A TYPICAL DAY: PATIENT DOES NOT DRINK
SKIP TO QUESTIONS 9-10: 1
AUDIT-C TOTAL SCORE: 0

## 2024-09-23 ASSESSMENT — PAIN SCALES - GENERAL
PAINLEVEL_OUTOF10: 0 - NO PAIN
PAINLEVEL_OUTOF10: 0 - NO PAIN
PAINLEVEL_OUTOF10: 5 - MODERATE PAIN
PAINLEVEL_OUTOF10: 7
PAINLEVEL_OUTOF10: 5 - MODERATE PAIN
PAINLEVEL_OUTOF10: 0 - NO PAIN
PAINLEVEL_OUTOF10: 0 - NO PAIN
PAINLEVEL_OUTOF10: 10 - WORST POSSIBLE PAIN
PAINLEVEL_OUTOF10: 7
PAINLEVEL_OUTOF10: 5 - MODERATE PAIN
PAINLEVEL_OUTOF10: 0 - NO PAIN
PAINLEVEL_OUTOF10: 10 - WORST POSSIBLE PAIN
PAINLEVEL_OUTOF10: 7
PAINLEVEL_OUTOF10: 0 - NO PAIN
PAINLEVEL_OUTOF10: 0 - NO PAIN

## 2024-09-23 ASSESSMENT — PAIN DESCRIPTION - LOCATION
LOCATION: HEAD
LOCATION: HEAD

## 2024-09-23 ASSESSMENT — ACTIVITIES OF DAILY LIVING (ADL): LACK_OF_TRANSPORTATION: NO

## 2024-09-23 ASSESSMENT — PAIN - FUNCTIONAL ASSESSMENT: PAIN_FUNCTIONAL_ASSESSMENT: 0-10

## 2024-09-23 ASSESSMENT — PATIENT HEALTH QUESTIONNAIRE - PHQ9
SUM OF ALL RESPONSES TO PHQ9 QUESTIONS 1 & 2: 0
1. LITTLE INTEREST OR PLEASURE IN DOING THINGS: NOT AT ALL
2. FEELING DOWN, DEPRESSED OR HOPELESS: NOT AT ALL

## 2024-09-23 NOTE — CARE PLAN
The patient's goals for the shift include      The clinical goals for the shift include Maintain reassuring FHR through delivery    Over the shift, the patient did not make progress toward the following goals. Barriers to progression include Bps.    BP on admission noted in mild range. Multiple severe range blood pressures necessitating treatment with IV anti-hypertensives to achieve stabilization. Pt with new diagnosis of severe PEC. Magnesium sulfate started. Patient with headache that improved with tylenol, benadryl, and reglan. Assessments WNL. Patient resting in bed with misoprostol in place and magnesium sulfate infusing. Spouse at bedside supportive of patient and involved in care.

## 2024-09-23 NOTE — SIGNIFICANT EVENT
P:C 2.23 after sitting in lab for several hours. Will send new specimen when pt voids again.  BP's currently normal range. Resting comfortably at this time.  Cyto#3 placed by CNPOWER at 1600, min change.

## 2024-09-23 NOTE — ANESTHESIA PREPROCEDURE EVALUATION
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 Per pt last seizure occurrence was one year ago.     (+) Anxiety and depression      GI   (+) IBS (irritable bowel syndrome)   (+) Maternal Crohn's disease affecting pregnancy, antepartum (Multi)      /Renal (within normal limits)      Liver (within normal limits)      Endocrine   (+) Gestational diabetes mellitus (GDM), antepartum ([facility])      Musculoskeletal  Per neurology note from 2018 history of bulging of lumbar intervertebral disc without myelopathy      GYN   (+) Pregnancy resulting from assisted reproductive technology, antepartum ([facility])       Clinical information reviewed:   Tobacco  Allergies   Problems     Fam Hx          NPO Detail: Last meal 9/23 at 0730  No data recorded     OB/Gyn Evaluation    Present Pregnancy    Patient is pregnant now.  (+) , gestational diabetes   Obstetric History                Physical Exam    Airway  Mallampati: II  TM distance: >3 FB  Neck ROM: full     Cardiovascular   Rate: normal     Dental    Pulmonary    Abdominal            Anesthesia Plan    History of general anesthesia?: yes  History of complications of general anesthesia?: no    ASA 3     epidural   (I informed and discussed the risks and benefits of general, spinal and epidural anesthesia with the patient.  The patient expressed her understanding and her questions were answered.  A verbal consent was given by the patient.   )  The patient is not a current smoker.  Patient did not smoke on day of procedure.    Anesthetic plan and risks discussed with patient.  Use of blood products  discussed with patient who consented to blood products.    Plan discussed with CRNA.

## 2024-09-23 NOTE — SIGNIFICANT EVENT
"S:   Pt seen for repeat dose cytotec at MD request as MD unavailable at this time.  Pt resting comfortably, tired but responsive and appropriate.  Denies HA, vision changes, RUQ pain, SOB, chest pain.  She did have migraine headache consistent w known history, which is resolved now s/p meds.  Agreeable to recheck now.  Some increased cramping after last cytotec.    O:  /79   Pulse 80   Temp 36.6 °C (97.9 °F)   Resp 18   Ht 1.626 m (5' 4\")   Wt 102 kg (225 lb 8.5 oz)   LMP  (LMP Unknown)   SpO2 97%   BMI 38.71 kg/m²   Magnesium infusing at 2g/hr    , moderate variability, +accels, no decels  Rare ctxs, irregular  2+ edema bilateral LE    Cervix unchanged, 0/60/-3, firm, midposition  Cytotec #3 placed now PV without difficulty.    A:  35 yo  at 39w4d for term IOL  Unfavorable cervix s/p cytotec x2  Severe preeclampsia by BP, currently normotensive s/p antihypertensives, on mag 2g/hr  Category I FHT  GBS neg  GDMA1  AMA  IVF pregnancy  Crohns disease on meds     P:  Cytotec #3 placed now without difficulty.  Discussed w pt and partner potential CRB with future exam as appropriate.  IUR as indicated  Continue magnesium and BP mgmt per MD orders  Continue glucose mgmt per MD orders  Continuous EFM  Reassess as indicated  Dr. Kent updated on pt status and mgmt as noted above, agrees with continuing with cervical ripening with cytotec now, she will continue managing care of this pt at this time due to high risk status.     Zoë Maciel, STEWART-HAILYM  "

## 2024-09-23 NOTE — H&P
Note Type:  OB Admission H&P    ASSESSMENT & PLAN: Archana Mullen is a 34 y.o.  at 39w4d who presents for  Induction of Labor    Plan:    -Admit to L&D, consented,    - IOL for GDMA1, IVF pregnancy, Crohns, AMA at 39+ weeks  -Labs drawn:  T&S, CBC, and Syphilis  -Epidural at patient request  -Recheck as clinically indicated by maternal or fetal status  -Have discussed likely plan for cytotec, CRB, when able, then Pitocin IOL as necessary.        Fetal Status    -EFW 7# by USN and leisaacds    -GBS neg    -Postpartum Contraception Plan:  none    Pregnancy #2 Problems (from 24 to present)       Problem Noted Resolved    Decreased fetal movements in third trimester ([facility]) 2024 by Shaka Richards MD No    Priority:  Medium      Suspected fetal abnormality affecting management of mother ([facility]) 2024 by Clarita Taylor MD No    Priority:  Medium      34 weeks gestation of pregnancy ([facility]) 2024 by Bina Vazquez MD No    Priority:  Medium      Overview Signed 2024 12:50 PM by STEWART Wilson-CNP     -Primary OB: Dr. Waddell          Pregnancy resulting from assisted reproductive technology, antepartum ([facility]) 2024 by Alberto Walter MD No    Priority:  Medium      Overview Addendum 2024  5:20 PM by STEWART Wilson-CNP     US at 30 and 36w  Weekly NSTs at 34-35 wks   1 embryo in cryo, completed IVF at    Fetal echo discussed  and recommended especially in the setting of incomplete heart views (pt counseled while in ultrasound per Dr. Colby) --> completed  and WNL         Advanced maternal age, primigravida, antepartum ([facility]) 2024 by Alberto Walter MD No    Priority:  Medium      Overview Signed 2024 12:50 PM by STEWART Wilson-CNP     -rr cfDNA   -Serial growth   -Weekly  testing at 34 wks if not initiated sooner          Gestational diabetes mellitus (GDM), antepartum ([facility]) 2024 by Alberto Walter MD No     Priority:  Medium      Overview Addendum 2024 10:38 AM by Beata Snyder, RN     -Shared Care with   - Attended Boot Camp   - MFM Consult completed   -MFM 36 wk visit pending   -Serial growth ultrasounds starting at 28 weeks    Last ultrasound:  : EFW 23%, AC 29%, see report for full details   : EFW 24%, AC 10%, 2VC, incomplete heart views (counseled in US per Dr. Colby re: new ultrasound findings    Fetal surveillance:   -Weekly at 34 wks     Current Regimen: nutrition-> BG log reviewed and more than 80% within goal range  24 nutrition   2024 Nutrition plan    2024 Nutrition plan  24 Nutrition plan  2024 :   9/3/2024 Nutrition plan    9/10/2024 Nutrition plan    2024 : nutrition             Delivery Plan: 39 wks pending continued BG control on nutrition plan  Intrapartum:  GDM protocol  Postpartum: No medication    Recommend PP 2hr gtt and q3yr F/U with PCP for A1C and TSH      IOL set for 39.4 weeks, per pt request         Maternal Crohn's disease affecting pregnancy, antepartum ([facility]) 2023 by Carolin Ledezma CMA No    Priority:  Medium      Overview Addendum 2024 12:41 PM by Mary Anne Diop APRN-CNP     Sees Dr. Angel Kirkland, GI  Entyvio 300 mg infusions every 8 weeks up until 34 weeks pregnant and then would hold infusions until after delivery   Last infusion --> plan for next infusion to be PP          Maternal hypotension syndrome in third trimester ([facility]) 2024 by Bina Vazquez MD 2024 by Yariel Waddell MD    Priority:  Medium      12 weeks gestation of pregnancy ([facility]) 3/11/2024 by Aftab Swann DO 2024 by Andreea John MA            Subjective   Presents for IOL    Prenatal Provider Nadira    OB History    Para Term  AB Living   2 0 0 0 1 0   SAB IAB Ectopic Multiple Live Births   1 0 0 0 0      # Outcome Date GA Lbr Silver/2nd Weight Sex Type Anes PTL Lv   2 Current             1 SAB 10/2023               Past Surgical History:   Procedure Laterality Date    APPENDECTOMY  04/20/2020    COLONOSCOPY      ESOPHAGOGASTRODUODENOSCOPY      OVARIAN CYST REMOVAL Right 08/18/2009    TONSILLECTOMY         Social History     Tobacco Use    Smoking status: Former     Types: Cigarettes     Passive exposure: Past    Smokeless tobacco: Never   Substance Use Topics    Alcohol use: Not Currently       Allergies   Allergen Reactions    Bupropion Swelling and Rash    Penicillin V Rash     Penicillin V Potassium TABS       (Not in a hospital admission)    Objective     Last Vitals  Temp Pulse Resp BP MAP O2 Sat                   Blood Pressures    No data found in the last 1 encounters.          Physical Exam  General: NAD, mood appropriate  Cardiopulmonary: warm and well perfused, breathing comfortably on room air  Abdomen: Gravid, non-tender  Extremities: full range of motion  SVE cl/60%/-3, vertex by BSUS

## 2024-09-23 NOTE — SIGNIFICANT EVENT
"S:   Pt seen for repeat dose cytotec at MD request as MD unavailable at this time.  Pt resting comfortably, tired but responsive and appropriate.  Denies HA, vision changes, RUQ pain, SOB, chest pain.  Agreeable to exam now.    O:  BP (!) 140/87   Pulse 86   Temp 36.6 °C (97.9 °F) (Oral)   Resp 18   Ht 1.626 m (5' 4\")   Wt 102 kg (225 lb 8.5 oz)   LMP  (LMP Unknown)   SpO2 (!) 95%   BMI 38.71 kg/m²   Magnesium infusing at 2g/hr    FHR baseline 120  Moderate variability  +accels, - decels  Rare ctxs, not endorsed by pt.    Cervix unchanged, 0/60/-3  Cytotec #2 placed now without difficulty.  Apparent prolonged deceleration at 1302 immediately after cytotec placement, pt lying on back for exam.  FHR resolved with position change to R side lying.  Will continue to monitor.    A:  35 yo  at 39w4d for term IOL  Unfavorable cervix  Severe preeclampsia by BP, currently mild range s/p antihypertensives, on mag 2g/hr  Category II FHT, improved with position change  GBS neg  GDMA1  AMA  IVF pregnancy  Crohns disease on meds    P:  Cytotec #2 placed now without difficulty.  IUR as indicated  Continue magnesium and BP mgmt per MD orders  Continue glucose mgmt per MD orders  Continuous EFM  Reassess as indicated  Dr. Kent updated on pt status and mgmt as noted above, she will continue primary care of this pt at this time due to high risk status.    Zoë Maciel, STEWART-HAILYM        "

## 2024-09-23 NOTE — SIGNIFICANT EVENT
"Pt with 2 severe range BP. Discussed diagnosis of sPEC and need to do labs, treat BP and start magnesium. Pt understands and agrees w plan. She received labetalol 20mg IV and magnesium started.  While I was in OR, notified of 2 more severe range BP's and patient had a \"shaking episode\". Labetalol 40mg ordered verbally.    Once out of the OR, I was able to assess patient. She is sitting up in bed with cool packs. She is conversing normally and has no obvious neurologic deficits. Pt states she has this \"seizure like activity\" when she is very stressed and is conscious during the shaking but unable to talk. She has been tested for epilepsy and is on no antiseizure meds. Was diagnosed with conversion disorder after workup in 2018.     Labs show plts of 147K and AST of 48. Will repeat labs at 1600. Urine P:C is pending.    Continue to closely monitor with low threshold for transfer  "

## 2024-09-23 NOTE — SIGNIFICANT EVENT
Cyto #1 placed without difficulty, pt tolerated well.  FHT's 140 with moderate variability and accels.

## 2024-09-24 ENCOUNTER — APPOINTMENT (OUTPATIENT)
Dept: RADIOLOGY | Facility: HOSPITAL | Age: 34
End: 2024-09-24
Payer: COMMERCIAL

## 2024-09-24 ENCOUNTER — ANESTHESIA EVENT (OUTPATIENT)
Dept: OBSTETRICS AND GYNECOLOGY | Facility: HOSPITAL | Age: 34
End: 2024-09-24
Payer: COMMERCIAL

## 2024-09-24 ENCOUNTER — ANESTHESIA (OUTPATIENT)
Dept: OBSTETRICS AND GYNECOLOGY | Facility: HOSPITAL | Age: 34
End: 2024-09-24
Payer: COMMERCIAL

## 2024-09-24 ENCOUNTER — HOSPITAL ENCOUNTER (INPATIENT)
Facility: HOSPITAL | Age: 34
LOS: 3 days | Discharge: HOME | End: 2024-09-27
Attending: OBSTETRICS & GYNECOLOGY | Admitting: OBSTETRICS & GYNECOLOGY
Payer: COMMERCIAL

## 2024-09-24 VITALS
RESPIRATION RATE: 18 BRPM | DIASTOLIC BLOOD PRESSURE: 81 MMHG | TEMPERATURE: 99.1 F | WEIGHT: 225.53 LBS | OXYGEN SATURATION: 97 % | HEART RATE: 93 BPM | SYSTOLIC BLOOD PRESSURE: 127 MMHG | HEIGHT: 64 IN | BODY MASS INDEX: 38.5 KG/M2

## 2024-09-24 DIAGNOSIS — L50.8 CHRONIC URTICARIA: ICD-10-CM

## 2024-09-24 LAB
ABO GROUP (TYPE) IN BLOOD: NORMAL
ABO GROUP (TYPE) IN BLOOD: NORMAL
ALBUMIN SERPL BCP-MCNC: 3.2 G/DL (ref 3.4–5)
ALBUMIN SERPL BCP-MCNC: 3.3 G/DL (ref 3.4–5)
ALP SERPL-CCNC: 195 U/L (ref 33–110)
ALP SERPL-CCNC: 197 U/L (ref 33–110)
ALT SERPL W P-5'-P-CCNC: 14 U/L (ref 7–45)
ALT SERPL W P-5'-P-CCNC: 15 U/L (ref 7–45)
ANION GAP SERPL CALC-SCNC: 15 MMOL/L (ref 10–20)
ANION GAP SERPL CALC-SCNC: 18 MMOL/L (ref 10–20)
ANTIBODY SCREEN: NORMAL
ANTIBODY SCREEN: NORMAL
AST SERPL W P-5'-P-CCNC: 43 U/L (ref 9–39)
AST SERPL W P-5'-P-CCNC: 48 U/L (ref 9–39)
BASE EXCESS BLDCOV CALC-SCNC: -4 MMOL/L (ref -8.1–-0.5)
BILIRUB SERPL-MCNC: 0.2 MG/DL (ref 0–1.2)
BILIRUB SERPL-MCNC: 0.3 MG/DL (ref 0–1.2)
BODY TEMPERATURE: 37 DEGREES CELSIUS
BUN SERPL-MCNC: 12 MG/DL (ref 6–23)
BUN SERPL-MCNC: 13 MG/DL (ref 6–23)
CALCIUM SERPL-MCNC: 7 MG/DL (ref 8.6–10.3)
CALCIUM SERPL-MCNC: 7.3 MG/DL (ref 8.6–10.6)
CHLORIDE SERPL-SCNC: 101 MMOL/L (ref 98–107)
CHLORIDE SERPL-SCNC: 102 MMOL/L (ref 98–107)
CO2 SERPL-SCNC: 18 MMOL/L (ref 21–32)
CO2 SERPL-SCNC: 20 MMOL/L (ref 21–32)
CREAT SERPL-MCNC: 0.68 MG/DL (ref 0.5–1.05)
CREAT SERPL-MCNC: 0.75 MG/DL (ref 0.5–1.05)
EGFRCR SERPLBLD CKD-EPI 2021: >90 ML/MIN/1.73M*2
EGFRCR SERPLBLD CKD-EPI 2021: >90 ML/MIN/1.73M*2
ERYTHROCYTE [DISTWIDTH] IN BLOOD BY AUTOMATED COUNT: 14.7 % (ref 11.5–14.5)
ERYTHROCYTE [DISTWIDTH] IN BLOOD BY AUTOMATED COUNT: 14.8 % (ref 11.5–14.5)
ERYTHROCYTE [DISTWIDTH] IN BLOOD BY AUTOMATED COUNT: 14.9 % (ref 11.5–14.5)
GLUCOSE BLD MANUAL STRIP-MCNC: 100 MG/DL (ref 74–99)
GLUCOSE BLD MANUAL STRIP-MCNC: 103 MG/DL (ref 74–99)
GLUCOSE BLD MANUAL STRIP-MCNC: 109 MG/DL (ref 74–99)
GLUCOSE BLD MANUAL STRIP-MCNC: 94 MG/DL (ref 74–99)
GLUCOSE SERPL-MCNC: 89 MG/DL (ref 74–99)
GLUCOSE SERPL-MCNC: 98 MG/DL (ref 74–99)
HCO3 BLDCOV-SCNC: 20.9 MMOL/L (ref 16–26)
HCT VFR BLD AUTO: 28.7 % (ref 36–46)
HCT VFR BLD AUTO: 34.5 % (ref 36–46)
HCT VFR BLD AUTO: 35 % (ref 36–46)
HGB BLD-MCNC: 11.3 G/DL (ref 12–16)
HGB BLD-MCNC: 11.6 G/DL (ref 12–16)
HGB BLD-MCNC: 9.3 G/DL (ref 12–16)
INHALED O2 CONCENTRATION: 21 %
LDH SERPL L TO P-CCNC: 226 U/L (ref 84–246)
MAGNESIUM SERPL-MCNC: 5.5 MG/DL (ref 1.6–2.4)
MCH RBC QN AUTO: 31.3 PG (ref 26–34)
MCH RBC QN AUTO: 31.4 PG (ref 26–34)
MCH RBC QN AUTO: 31.6 PG (ref 26–34)
MCHC RBC AUTO-ENTMCNC: 32.4 G/DL (ref 32–36)
MCHC RBC AUTO-ENTMCNC: 32.8 G/DL (ref 32–36)
MCHC RBC AUTO-ENTMCNC: 33.1 G/DL (ref 32–36)
MCV RBC AUTO: 95 FL (ref 80–100)
MCV RBC AUTO: 96 FL (ref 80–100)
MCV RBC AUTO: 97 FL (ref 80–100)
NRBC BLD-RTO: 0 /100 WBCS (ref 0–0)
OXYHGB MFR BLDCOV: 65.5 % (ref 94–98)
PCO2 BLDCOV: 37 MM HG (ref 22–53)
PH BLDCOV: 7.36 PH (ref 7.19–7.47)
PLATELET # BLD AUTO: 137 X10*3/UL (ref 150–450)
PLATELET # BLD AUTO: 153 X10*3/UL (ref 150–450)
PLATELET # BLD AUTO: 154 X10*3/UL (ref 150–450)
PO2 BLDCOV: 33 MM HG (ref 13–37)
POTASSIUM SERPL-SCNC: 4.1 MMOL/L (ref 3.5–5.3)
POTASSIUM SERPL-SCNC: 4.2 MMOL/L (ref 3.5–5.3)
PROT SERPL-MCNC: 5.6 G/DL (ref 6.4–8.2)
PROT SERPL-MCNC: 5.9 G/DL (ref 6.4–8.2)
RBC # BLD AUTO: 2.97 X10*6/UL (ref 4–5.2)
RBC # BLD AUTO: 3.6 X10*6/UL (ref 4–5.2)
RBC # BLD AUTO: 3.67 X10*6/UL (ref 4–5.2)
RH FACTOR (ANTIGEN D): NORMAL
RH FACTOR (ANTIGEN D): NORMAL
SAO2 % BLDCOV: 67 % (ref 16–84)
SODIUM SERPL-SCNC: 133 MMOL/L (ref 136–145)
SODIUM SERPL-SCNC: 133 MMOL/L (ref 136–145)
URATE SERPL-MCNC: 6.1 MG/DL (ref 2.3–6.7)
WBC # BLD AUTO: 10.9 X10*3/UL (ref 4.4–11.3)
WBC # BLD AUTO: 10.9 X10*3/UL (ref 4.4–11.3)
WBC # BLD AUTO: 14.5 X10*3/UL (ref 4.4–11.3)

## 2024-09-24 PROCEDURE — 2500000005 HC RX 250 GENERAL PHARMACY W/O HCPCS

## 2024-09-24 PROCEDURE — 2500000001 HC RX 250 WO HCPCS SELF ADMINISTERED DRUGS (ALT 637 FOR MEDICARE OP)

## 2024-09-24 PROCEDURE — 3700000014 HC AN EPIDURAL BLOCK CHARGE: Performed by: OBSTETRICS & GYNECOLOGY

## 2024-09-24 PROCEDURE — 86923 COMPATIBILITY TEST ELECTRIC: CPT

## 2024-09-24 PROCEDURE — 88307 TISSUE EXAM BY PATHOLOGIST: CPT | Mod: TC,SUR | Performed by: OBSTETRICS & GYNECOLOGY

## 2024-09-24 PROCEDURE — G0378 HOSPITAL OBSERVATION PER HR: HCPCS

## 2024-09-24 PROCEDURE — 51702 INSERT TEMP BLADDER CATH: CPT

## 2024-09-24 PROCEDURE — 7210000002 HC LABOR PER HOUR

## 2024-09-24 PROCEDURE — 01967 NEURAXL LBR ANES VAG DLVR: CPT | Performed by: STUDENT IN AN ORGANIZED HEALTH CARE EDUCATION/TRAINING PROGRAM

## 2024-09-24 PROCEDURE — 99199 UNLISTED SPECIAL SVC PX/RPRT: CPT

## 2024-09-24 PROCEDURE — 2500000004 HC RX 250 GENERAL PHARMACY W/ HCPCS (ALT 636 FOR OP/ED): Performed by: MIDWIFE

## 2024-09-24 PROCEDURE — 83615 LACTATE (LD) (LDH) ENZYME: CPT | Performed by: STUDENT IN AN ORGANIZED HEALTH CARE EDUCATION/TRAINING PROGRAM

## 2024-09-24 PROCEDURE — 10H07YZ INSERTION OF OTHER DEVICE INTO PRODUCTS OF CONCEPTION, VIA NATURAL OR ARTIFICIAL OPENING: ICD-10-PCS | Performed by: OBSTETRICS & GYNECOLOGY

## 2024-09-24 PROCEDURE — 85027 COMPLETE CBC AUTOMATED: CPT

## 2024-09-24 PROCEDURE — 2500000004 HC RX 250 GENERAL PHARMACY W/ HCPCS (ALT 636 FOR OP/ED)

## 2024-09-24 PROCEDURE — 84075 ASSAY ALKALINE PHOSPHATASE: CPT

## 2024-09-24 PROCEDURE — 01968 ANES/ANALG CS DLVR NEURAXIAL: CPT | Performed by: STUDENT IN AN ORGANIZED HEALTH CARE EDUCATION/TRAINING PROGRAM

## 2024-09-24 PROCEDURE — 36415 COLL VENOUS BLD VENIPUNCTURE: CPT | Performed by: STUDENT IN AN ORGANIZED HEALTH CARE EDUCATION/TRAINING PROGRAM

## 2024-09-24 PROCEDURE — 2720000007 HC OR 272 NO HCPCS: Performed by: OBSTETRICS & GYNECOLOGY

## 2024-09-24 PROCEDURE — 59510 CESAREAN DELIVERY: CPT

## 2024-09-24 PROCEDURE — 36415 COLL VENOUS BLD VENIPUNCTURE: CPT

## 2024-09-24 PROCEDURE — 84550 ASSAY OF BLOOD/URIC ACID: CPT | Performed by: STUDENT IN AN ORGANIZED HEALTH CARE EDUCATION/TRAINING PROGRAM

## 2024-09-24 PROCEDURE — 2500000004 HC RX 250 GENERAL PHARMACY W/ HCPCS (ALT 636 FOR OP/ED): Performed by: STUDENT IN AN ORGANIZED HEALTH CARE EDUCATION/TRAINING PROGRAM

## 2024-09-24 PROCEDURE — 2500000005 HC RX 250 GENERAL PHARMACY W/O HCPCS: Performed by: STUDENT IN AN ORGANIZED HEALTH CARE EDUCATION/TRAINING PROGRAM

## 2024-09-24 PROCEDURE — 2500000001 HC RX 250 WO HCPCS SELF ADMINISTERED DRUGS (ALT 637 FOR MEDICARE OP): Performed by: STUDENT IN AN ORGANIZED HEALTH CARE EDUCATION/TRAINING PROGRAM

## 2024-09-24 PROCEDURE — 86901 BLOOD TYPING SEROLOGIC RH(D): CPT

## 2024-09-24 PROCEDURE — 1100000001 HC PRIVATE ROOM DAILY

## 2024-09-24 PROCEDURE — 85027 COMPLETE CBC AUTOMATED: CPT | Performed by: STUDENT IN AN ORGANIZED HEALTH CARE EDUCATION/TRAINING PROGRAM

## 2024-09-24 PROCEDURE — 99222 1ST HOSP IP/OBS MODERATE 55: CPT

## 2024-09-24 PROCEDURE — 7100000016 HC LABOR RECOVERY PER HOUR: Performed by: OBSTETRICS & GYNECOLOGY

## 2024-09-24 PROCEDURE — 83735 ASSAY OF MAGNESIUM: CPT | Performed by: STUDENT IN AN ORGANIZED HEALTH CARE EDUCATION/TRAINING PROGRAM

## 2024-09-24 PROCEDURE — 2500000002 HC RX 250 W HCPCS SELF ADMINISTERED DRUGS (ALT 637 FOR MEDICARE OP, ALT 636 FOR OP/ED)

## 2024-09-24 PROCEDURE — 82947 ASSAY GLUCOSE BLOOD QUANT: CPT

## 2024-09-24 PROCEDURE — 84075 ASSAY ALKALINE PHOSPHATASE: CPT | Performed by: STUDENT IN AN ORGANIZED HEALTH CARE EDUCATION/TRAINING PROGRAM

## 2024-09-24 PROCEDURE — 59514 CESAREAN DELIVERY ONLY: CPT | Performed by: OBSTETRICS & GYNECOLOGY

## 2024-09-24 PROCEDURE — 71045 X-RAY EXAM CHEST 1 VIEW: CPT

## 2024-09-24 PROCEDURE — 96376 TX/PRO/DX INJ SAME DRUG ADON: CPT | Mod: 59

## 2024-09-24 PROCEDURE — 82805 BLOOD GASES W/O2 SATURATION: CPT | Performed by: STUDENT IN AN ORGANIZED HEALTH CARE EDUCATION/TRAINING PROGRAM

## 2024-09-24 PROCEDURE — 86901 BLOOD TYPING SEROLOGIC RH(D): CPT | Performed by: ADVANCED PRACTICE MIDWIFE

## 2024-09-24 PROCEDURE — 2500000005 HC RX 250 GENERAL PHARMACY W/O HCPCS: Performed by: NURSE ANESTHETIST, CERTIFIED REGISTERED

## 2024-09-24 PROCEDURE — 3700000014 EPIDURAL BLOCK: Performed by: STUDENT IN AN ORGANIZED HEALTH CARE EDUCATION/TRAINING PROGRAM

## 2024-09-24 RX ORDER — LABETALOL HYDROCHLORIDE 5 MG/ML
20 INJECTION, SOLUTION INTRAVENOUS ONCE AS NEEDED
Status: COMPLETED | OUTPATIENT
Start: 2024-09-24 | End: 2024-09-24

## 2024-09-24 RX ORDER — OXYTOCIN/0.9 % SODIUM CHLORIDE 30/500 ML
2-30 PLASTIC BAG, INJECTION (ML) INTRAVENOUS CONTINUOUS
Status: DISCONTINUED | OUTPATIENT
Start: 2024-09-24 | End: 2024-09-24

## 2024-09-24 RX ORDER — SODIUM CHLORIDE, SODIUM LACTATE, POTASSIUM CHLORIDE, CALCIUM CHLORIDE 600; 310; 30; 20 MG/100ML; MG/100ML; MG/100ML; MG/100ML
20 INJECTION, SOLUTION INTRAVENOUS CONTINUOUS
Status: DISCONTINUED | OUTPATIENT
Start: 2024-09-24 | End: 2024-09-27 | Stop reason: HOSPADM

## 2024-09-24 RX ORDER — NIFEDIPINE 30 MG/1
30 TABLET, FILM COATED, EXTENDED RELEASE ORAL DAILY
Status: DISCONTINUED | OUTPATIENT
Start: 2024-09-24 | End: 2024-09-24

## 2024-09-24 RX ORDER — NIFEDIPINE 60 MG/1
60 TABLET, FILM COATED, EXTENDED RELEASE ORAL DAILY
Status: DISCONTINUED | OUTPATIENT
Start: 2024-09-24 | End: 2024-09-27 | Stop reason: HOSPADM

## 2024-09-24 RX ORDER — NIFEDIPINE 10 MG/1
10 CAPSULE ORAL ONCE AS NEEDED
Status: DISCONTINUED | OUTPATIENT
Start: 2024-09-24 | End: 2024-09-24

## 2024-09-24 RX ORDER — DIPHENHYDRAMINE HCL 25 MG
25 CAPSULE ORAL EVERY 6 HOURS PRN
Status: DISCONTINUED | OUTPATIENT
Start: 2024-09-24 | End: 2024-09-24

## 2024-09-24 RX ORDER — OXYTOCIN/0.9 % SODIUM CHLORIDE 30/500 ML
60 PLASTIC BAG, INJECTION (ML) INTRAVENOUS ONCE AS NEEDED
Status: DISCONTINUED | OUTPATIENT
Start: 2024-09-24 | End: 2024-09-24

## 2024-09-24 RX ORDER — TERBUTALINE SULFATE 1 MG/ML
0.25 INJECTION SUBCUTANEOUS ONCE AS NEEDED
Status: DISCONTINUED | OUTPATIENT
Start: 2024-09-24 | End: 2024-09-24

## 2024-09-24 RX ORDER — PHENYLEPHRINE HCL IN 0.9% NACL 0.4MG/10ML
SYRINGE (ML) INTRAVENOUS AS NEEDED
Status: DISCONTINUED | OUTPATIENT
Start: 2024-09-24 | End: 2024-09-24

## 2024-09-24 RX ORDER — METOCLOPRAMIDE 10 MG/1
10 TABLET ORAL ONCE
Status: COMPLETED | OUTPATIENT
Start: 2024-09-24 | End: 2024-09-24

## 2024-09-24 RX ORDER — ESCITALOPRAM OXALATE 10 MG/1
10 TABLET ORAL NIGHTLY
Status: DISCONTINUED | OUTPATIENT
Start: 2024-09-24 | End: 2024-09-27 | Stop reason: HOSPADM

## 2024-09-24 RX ORDER — MAGNESIUM SULFATE HEPTAHYDRATE 40 MG/ML
2 INJECTION, SOLUTION INTRAVENOUS CONTINUOUS
Status: DISCONTINUED | OUTPATIENT
Start: 2024-09-24 | End: 2024-09-27 | Stop reason: HOSPADM

## 2024-09-24 RX ORDER — OXYTOCIN 10 [USP'U]/ML
10 INJECTION, SOLUTION INTRAMUSCULAR; INTRAVENOUS ONCE AS NEEDED
Status: DISCONTINUED | OUTPATIENT
Start: 2024-09-24 | End: 2024-09-24

## 2024-09-24 RX ORDER — ACETAMINOPHEN 325 MG/1
925 TABLET ORAL EVERY 6 HOURS PRN
Status: DISCONTINUED | OUTPATIENT
Start: 2024-09-24 | End: 2024-09-24

## 2024-09-24 RX ORDER — HYDRALAZINE HYDROCHLORIDE 20 MG/ML
5 INJECTION INTRAMUSCULAR; INTRAVENOUS ONCE AS NEEDED
Status: DISCONTINUED | OUTPATIENT
Start: 2024-09-24 | End: 2024-09-24

## 2024-09-24 RX ORDER — CALCIUM GLUCONATE 98 MG/ML
1 INJECTION, SOLUTION INTRAVENOUS ONCE AS NEEDED
Status: DISCONTINUED | OUTPATIENT
Start: 2024-09-24 | End: 2024-09-27 | Stop reason: HOSPADM

## 2024-09-24 RX ORDER — INSULIN LISPRO 100 [IU]/ML
0-10 INJECTION, SOLUTION INTRAVENOUS; SUBCUTANEOUS EVERY 4 HOURS
Status: DISCONTINUED | OUTPATIENT
Start: 2024-09-24 | End: 2024-09-24

## 2024-09-24 RX ORDER — LOPERAMIDE HYDROCHLORIDE 2 MG/1
4 CAPSULE ORAL EVERY 2 HOUR PRN
Status: DISCONTINUED | OUTPATIENT
Start: 2024-09-24 | End: 2024-09-24

## 2024-09-24 RX ORDER — GLYCOPYRROLATE 0.2 MG/ML
INJECTION INTRAMUSCULAR; INTRAVENOUS AS NEEDED
Status: DISCONTINUED | OUTPATIENT
Start: 2024-09-24 | End: 2024-09-24

## 2024-09-24 RX ORDER — AZITHROMYCIN 100 MG/ML
INJECTION, POWDER, LYOPHILIZED, FOR SOLUTION INTRAVENOUS AS NEEDED
Status: DISCONTINUED | OUTPATIENT
Start: 2024-09-24 | End: 2024-09-24

## 2024-09-24 RX ORDER — CARBOPROST TROMETHAMINE 250 UG/ML
250 INJECTION, SOLUTION INTRAMUSCULAR ONCE AS NEEDED
Status: COMPLETED | OUTPATIENT
Start: 2024-09-24 | End: 2024-09-24

## 2024-09-24 RX ORDER — LABETALOL HYDROCHLORIDE 5 MG/ML
40 INJECTION, SOLUTION INTRAVENOUS ONCE
Status: COMPLETED | OUTPATIENT
Start: 2024-09-24 | End: 2024-09-24

## 2024-09-24 RX ORDER — IBUPROFEN 600 MG/1
600 TABLET ORAL EVERY 6 HOURS
Status: DISCONTINUED | OUTPATIENT
Start: 2024-09-25 | End: 2024-09-27 | Stop reason: HOSPADM

## 2024-09-24 RX ORDER — FENTANYL/BUPIVACAINE/NS/PF 2MCG/ML-.1
PLASTIC BAG, INJECTION (ML) INJECTION CONTINUOUS PRN
Status: DISCONTINUED | OUTPATIENT
Start: 2024-09-24 | End: 2024-09-24

## 2024-09-24 RX ORDER — ACETAMINOPHEN 325 MG/1
975 TABLET ORAL EVERY 6 HOURS
Status: DISCONTINUED | OUTPATIENT
Start: 2024-09-25 | End: 2024-09-27 | Stop reason: HOSPADM

## 2024-09-24 RX ORDER — FAMOTIDINE 10 MG/ML
INJECTION INTRAVENOUS AS NEEDED
Status: DISCONTINUED | OUTPATIENT
Start: 2024-09-24 | End: 2024-09-24

## 2024-09-24 RX ORDER — MISOPROSTOL 200 UG/1
800 TABLET ORAL ONCE AS NEEDED
Status: DISCONTINUED | OUTPATIENT
Start: 2024-09-24 | End: 2024-09-24

## 2024-09-24 RX ORDER — CEFAZOLIN 1 G/1
INJECTION, POWDER, FOR SOLUTION INTRAVENOUS AS NEEDED
Status: DISCONTINUED | OUTPATIENT
Start: 2024-09-24 | End: 2024-09-24

## 2024-09-24 RX ORDER — TRANEXAMIC ACID 100 MG/ML
1000 INJECTION, SOLUTION INTRAVENOUS ONCE AS NEEDED
Status: DISCONTINUED | OUTPATIENT
Start: 2024-09-24 | End: 2024-09-24

## 2024-09-24 RX ORDER — LABETALOL HYDROCHLORIDE 5 MG/ML
INJECTION, SOLUTION INTRAVENOUS
Status: COMPLETED
Start: 2024-09-24 | End: 2024-09-24

## 2024-09-24 RX ORDER — DIPHENHYDRAMINE HYDROCHLORIDE 50 MG/ML
INJECTION INTRAMUSCULAR; INTRAVENOUS AS NEEDED
Status: DISCONTINUED | OUTPATIENT
Start: 2024-09-24 | End: 2024-09-24

## 2024-09-24 RX ORDER — METOCLOPRAMIDE 10 MG/1
10 TABLET ORAL EVERY 6 HOURS PRN
Status: DISCONTINUED | OUTPATIENT
Start: 2024-09-24 | End: 2024-09-24

## 2024-09-24 RX ORDER — DEXTROSE 40 %
15 GEL (GRAM) ORAL
Status: DISCONTINUED | OUTPATIENT
Start: 2024-09-24 | End: 2024-09-24

## 2024-09-24 RX ORDER — KETOROLAC TROMETHAMINE 30 MG/ML
INJECTION, SOLUTION INTRAMUSCULAR; INTRAVENOUS AS NEEDED
Status: DISCONTINUED | OUTPATIENT
Start: 2024-09-24 | End: 2024-09-24

## 2024-09-24 RX ORDER — ONDANSETRON HYDROCHLORIDE 2 MG/ML
4 INJECTION, SOLUTION INTRAVENOUS EVERY 6 HOURS PRN
Status: DISCONTINUED | OUTPATIENT
Start: 2024-09-24 | End: 2024-09-24

## 2024-09-24 RX ORDER — SUMATRIPTAN 50 MG/1
50 TABLET, FILM COATED ORAL ONCE
Status: COMPLETED | OUTPATIENT
Start: 2024-09-24 | End: 2024-09-24

## 2024-09-24 RX ORDER — DIPHENHYDRAMINE HCL 25 MG
25 CAPSULE ORAL ONCE
Status: COMPLETED | OUTPATIENT
Start: 2024-09-24 | End: 2024-09-24

## 2024-09-24 RX ORDER — ONDANSETRON 4 MG/1
4 TABLET, FILM COATED ORAL EVERY 6 HOURS PRN
Status: DISCONTINUED | OUTPATIENT
Start: 2024-09-24 | End: 2024-09-24

## 2024-09-24 RX ORDER — DEXMEDETOMIDINE IN 0.9 % NACL 20 MCG/5ML
SYRINGE (ML) INTRAVENOUS AS NEEDED
Status: DISCONTINUED | OUTPATIENT
Start: 2024-09-24 | End: 2024-09-24

## 2024-09-24 RX ORDER — KETOROLAC TROMETHAMINE 30 MG/ML
30 INJECTION, SOLUTION INTRAMUSCULAR; INTRAVENOUS EVERY 6 HOURS
Status: COMPLETED | OUTPATIENT
Start: 2024-09-25 | End: 2024-09-25

## 2024-09-24 RX ORDER — LIDOCAINE HYDROCHLORIDE 10 MG/ML
30 INJECTION, SOLUTION INFILTRATION; PERINEURAL ONCE AS NEEDED
Status: DISCONTINUED | OUTPATIENT
Start: 2024-09-24 | End: 2024-09-24

## 2024-09-24 RX ORDER — LIDOCAINE HCL/EPINEPHRINE/PF 2%-1:200K
VIAL (ML) INJECTION AS NEEDED
Status: DISCONTINUED | OUTPATIENT
Start: 2024-09-24 | End: 2024-09-24

## 2024-09-24 RX ORDER — METHYLERGONOVINE MALEATE 0.2 MG/ML
0.2 INJECTION INTRAVENOUS ONCE AS NEEDED
Status: DISCONTINUED | OUTPATIENT
Start: 2024-09-24 | End: 2024-09-24

## 2024-09-24 RX ORDER — CAFFEINE 200 MG
100 TABLET ORAL ONCE
Status: COMPLETED | OUTPATIENT
Start: 2024-09-24 | End: 2024-09-24

## 2024-09-24 RX ORDER — MORPHINE SULFATE 0.5 MG/ML
INJECTION, SOLUTION EPIDURAL; INTRATHECAL; INTRAVENOUS AS NEEDED
Status: DISCONTINUED | OUTPATIENT
Start: 2024-09-24 | End: 2024-09-24

## 2024-09-24 RX ORDER — FENTANYL/BUPIVACAINE/NS/PF 2MCG/ML-.1
PLASTIC BAG, INJECTION (ML) INJECTION AS NEEDED
Status: DISCONTINUED | OUTPATIENT
Start: 2024-09-24 | End: 2024-09-24

## 2024-09-24 RX ORDER — METOCLOPRAMIDE HYDROCHLORIDE 5 MG/ML
10 INJECTION INTRAMUSCULAR; INTRAVENOUS EVERY 6 HOURS PRN
Status: DISCONTINUED | OUTPATIENT
Start: 2024-09-24 | End: 2024-09-24

## 2024-09-24 SDOH — SOCIAL STABILITY: SOCIAL INSECURITY: ARE THERE ANY APPARENT SIGNS OF INJURIES/BEHAVIORS THAT COULD BE RELATED TO ABUSE/NEGLECT?: NO

## 2024-09-24 SDOH — SOCIAL STABILITY: SOCIAL INSECURITY: ARE YOU OR HAVE YOU BEEN THREATENED OR ABUSED PHYSICALLY, EMOTIONALLY, OR SEXUALLY BY ANYONE?: NO

## 2024-09-24 SDOH — SOCIAL STABILITY: SOCIAL INSECURITY: HAS ANYONE EVER THREATENED TO HURT YOUR FAMILY OR YOUR PETS?: NO

## 2024-09-24 SDOH — HEALTH STABILITY: MENTAL HEALTH: SUICIDAL BEHAVIOR (LIFETIME): NO

## 2024-09-24 SDOH — ECONOMIC STABILITY: TRANSPORTATION INSECURITY
IN THE PAST 12 MONTHS, HAS THE LACK OF TRANSPORTATION KEPT YOU FROM MEDICAL APPOINTMENTS OR FROM GETTING MEDICATIONS?: NO

## 2024-09-24 SDOH — HEALTH STABILITY: MENTAL HEALTH: WISH TO BE DEAD (PAST 1 MONTH): NO

## 2024-09-24 SDOH — SOCIAL STABILITY: SOCIAL INSECURITY: HAVE YOU HAD ANY THOUGHTS OF HARMING ANYONE ELSE?: NO

## 2024-09-24 SDOH — ECONOMIC STABILITY: HOUSING INSECURITY: DO YOU FEEL UNSAFE GOING BACK TO THE PLACE WHERE YOU ARE LIVING?: NO

## 2024-09-24 SDOH — HEALTH STABILITY: MENTAL HEALTH: WERE YOU ABLE TO COMPLETE ALL THE BEHAVIORAL HEALTH SCREENINGS?: YES

## 2024-09-24 SDOH — SOCIAL STABILITY: SOCIAL INSECURITY: DO YOU FEEL ANYONE HAS EXPLOITED OR TAKEN ADVANTAGE OF YOU FINANCIALLY OR OF YOUR PERSONAL PROPERTY?: NO

## 2024-09-24 SDOH — SOCIAL STABILITY: SOCIAL INSECURITY: HAVE YOU HAD THOUGHTS OF HARMING ANYONE ELSE?: NO

## 2024-09-24 SDOH — SOCIAL STABILITY: SOCIAL INSECURITY: DOES ANYONE TRY TO KEEP YOU FROM HAVING/CONTACTING OTHER FRIENDS OR DOING THINGS OUTSIDE YOUR HOME?: NO

## 2024-09-24 SDOH — SOCIAL STABILITY: SOCIAL INSECURITY: ABUSE SCREEN: ADULT

## 2024-09-24 SDOH — ECONOMIC STABILITY: INCOME INSECURITY: HOW HARD IS IT FOR YOU TO PAY FOR THE VERY BASICS LIKE FOOD, HOUSING, MEDICAL CARE, AND HEATING?: NOT HARD AT ALL

## 2024-09-24 SDOH — HEALTH STABILITY: MENTAL HEALTH: NON-SPECIFIC ACTIVE SUICIDAL THOUGHTS (PAST 1 MONTH): NO

## 2024-09-24 SDOH — HEALTH STABILITY: MENTAL HEALTH: CURRENT SMOKER: 0

## 2024-09-24 SDOH — ECONOMIC STABILITY: TRANSPORTATION INSECURITY
IN THE PAST 12 MONTHS, HAS LACK OF TRANSPORTATION KEPT YOU FROM MEETINGS, WORK, OR FROM GETTING THINGS NEEDED FOR DAILY LIVING?: NO

## 2024-09-24 SDOH — SOCIAL STABILITY: SOCIAL INSECURITY: VERBAL ABUSE: DENIES

## 2024-09-24 SDOH — SOCIAL STABILITY: SOCIAL INSECURITY: PHYSICAL ABUSE: DENIES

## 2024-09-24 ASSESSMENT — PAIN SCALES - GENERAL
PAINLEVEL_OUTOF10: 0 - NO PAIN
PAINLEVEL_OUTOF10: 5 - MODERATE PAIN
PAINLEVEL_OUTOF10: 0 - NO PAIN
PAINLEVEL_OUTOF10: 0 - NO PAIN
PAINLEVEL_OUTOF10: 10 - WORST POSSIBLE PAIN
PAINLEVEL_OUTOF10: 0 - NO PAIN
PAINLEVEL_OUTOF10: 0 - NO PAIN
PAINLEVEL_OUTOF10: 4
PAIN_LEVEL: 0
PAINLEVEL_OUTOF10: 10 - WORST POSSIBLE PAIN
PAINLEVEL_OUTOF10: 0 - NO PAIN
PAINLEVEL_OUTOF10: 0 - NO PAIN
PAINLEVEL_OUTOF10: 10 - WORST POSSIBLE PAIN
PAINLEVEL_OUTOF10: 0 - NO PAIN
PAINLEVEL_OUTOF10: 5 - MODERATE PAIN
PAINLEVEL_OUTOF10: 0 - NO PAIN

## 2024-09-24 ASSESSMENT — PAIN - FUNCTIONAL ASSESSMENT: PAIN_FUNCTIONAL_ASSESSMENT: 0-10

## 2024-09-24 ASSESSMENT — LIFESTYLE VARIABLES
HOW OFTEN DO YOU HAVE A DRINK CONTAINING ALCOHOL: NEVER
AUDIT-C TOTAL SCORE: 0
AUDIT-C TOTAL SCORE: 0
SKIP TO QUESTIONS 9-10: 1
HOW OFTEN DO YOU HAVE 6 OR MORE DRINKS ON ONE OCCASION: NEVER
HOW MANY STANDARD DRINKS CONTAINING ALCOHOL DO YOU HAVE ON A TYPICAL DAY: PATIENT DOES NOT DRINK

## 2024-09-24 ASSESSMENT — PATIENT HEALTH QUESTIONNAIRE - PHQ9
2. FEELING DOWN, DEPRESSED OR HOPELESS: NOT AT ALL
1. LITTLE INTEREST OR PLEASURE IN DOING THINGS: NOT AT ALL
SUM OF ALL RESPONSES TO PHQ9 QUESTIONS 1 & 2: 0

## 2024-09-24 NOTE — H&P
Note Type:  OB Admission H&P    ASSESSMENT & PLAN: Archana Mullen is a 34 y.o.  at 39w5d by 5d embryo transfer c/w 6w ultrasound who presents as a transfer from [facility] for IOL in the setting of sPEC    Plan:    IOL  -transfer from [facility]   -Epidural at patient request  -Recheck as clinically indicated by maternal or fetal status  -s/p cyto x4 at [facility], unable to place CRB  -Patient felt she was peeing herself, however, paniagua in place. On exam, patient experienced a contraction and meconium stained fluid was noted coming from vagina, nitrazine positive   -Start pitocin given patient is ruptured     sPEC  -dx by sustained severe range BP requiring IV treatment   -s/p labetalol 20/40 at [facility]   -PO regimen: nifed 30   -Mg   -HELLP labs neg x2, P:C 2.23    Conversion Disorder   -dx in 2018  -experiences shaking when she is stressed. She is conscious during these episodes, able to breath through them.   -1x episode at [facility]  -Occurred while provider was in [place] [facility], patient was responsive throughout and remembers the event  -no concern for seizures at this time     Fetal Status  -cat 1 fetal tracing, CEFM  -Presentation cephalic based on cervical exam  -GBS negative    Pregnancy Notables  -A1DM   -Chron's disease, on Entyvio 300mg infusion, last infusion , plan for next infusion PP  -anxiety/depression, on lexapro  -conversion d/o with sz or convulsions, shaking when very stressed, conscious but doesn't talk during episodes  -AMA 34y.o.   -IVF pregnancy     -Postpartum Contraception Plan:  unknown at this time, will reassess when patient feeling better     Discussed with Dr. Mcfarland and Dr. Andrzej Crump MD  PGY1, Obstetrics and Gynecology    Subjective   Notes she has had a HA for the past few weeks, has worsened in the last couple of days. Has a history of migraines, prior to pregnancy took Qulipta but was told she could not take it during pregnancy or while breastfeeding. Reports this HA  feels different than her migraines. Reports 10/10 HA, has not improved with medications. Notes blurry vision. Denies SOB, chest pain, RUQ pain. Good fetal movement.  Notes her Chron's is in remission, feels well in this regard. Of note at [facility], patient had severe range blood pressures treated with labetalol 20/40.         Prenatal Provider Dr. Waddell     OB History    Para Term  AB Living   2 0 0 0 1 0   SAB IAB Ectopic Multiple Live Births   1 0 0 0 0      # Outcome Date GA Lbr Silver/2nd Weight Sex Type Anes PTL Lv   2 Current            1 SAB 10/2023               Past Surgical History:   Procedure Laterality Date    APPENDECTOMY  2020    COLONOSCOPY      ESOPHAGOGASTRODUODENOSCOPY      OVARIAN CYST REMOVAL Right 2009    TONSILLECTOMY         Social History     Tobacco Use    Smoking status: Former     Types: Cigarettes     Passive exposure: Past    Smokeless tobacco: Never   Substance Use Topics    Alcohol use: Not Currently       Allergies   Allergen Reactions    Wellbutrin [Bupropion Hcl] Hives    Bupropion Swelling and Rash    Penicillin V Rash     Penicillin V Potassium TABS       Medications Prior to Admission   Medication Sig Dispense Refill Last Dose    Autolet lancing device Test 4 times a day: fasting and 1 hour after breakfast/lunch/dinner 120 each 11     blood sugar diagnostic strip 1 strip 4 times a day. Please dispense strips compatible with patients meter 120 strip 5     blood-glucose meter misc 1 kit by soft tissue injection route 4 times a day. Test and record values 4 times a day: fasting (upon waking) and 1 hour after breakfast/lunch/dinner (1 hour from first bite if meal finished within 30 minutes) 1 each 0     blood-glucose sensor (FreeStyle Taylor 3 Sensor) device 28-day supply of FreeStyle Taylor 3 sensors (2 sensors); use as directed 2 each 11     escitalopram (Lexapro) 10 mg tablet Take 1 tablet (10 mg) by mouth once daily. 90 tablet 0     isopropyl alcohoL 70 %  towelette Test 4 times a day: fasting and 1 hour after breakfast/lunch/dinner 120 each 11     meclizine (Antivert) 25 mg tablet Take 1 tablet (25 mg) by mouth 3 times a day as needed for dizziness. (Patient not taking: Reported on 9/23/2024) 30 tablet 0     prenatal no115/iron/folic acid (PRENATAL 19 ORAL) Take 1 capsule by mouth once daily.       vedolizumab (Entyvio) 300 mg injection Infuse 300 mg into a venous catheter 1 time.  For Maintenance: every 8 weeks        Objective     Last Vitals  Temp Pulse Resp BP MAP O2 Sat   37.4 °C (99.3 °F) 91 16 (!) 143/88 111 (!) 95 %     Blood Pressures         9/24/2024  0217             BP: 143/88             Physical Exam  General: NAD, mood appropriate  Cardiopulmonary: warm and well perfused, breathing comfortably on room air  Abdomen: Gravid, non-tender  Extremities: full range of motion  Speculum Exam: deferred  Cervix: 1 /30 /-3      Fetal Monitoring  Baseline: 125 bpm, Variability: moderate,  Accelerations: present and Decelerations: none    Uterine Activity: q5-7 minutes    Interpretation: Category 1    Bedside ultrasound: No    Labs in chart were reviewed.     Results from last 7 days   Lab Units 09/24/24  0023 09/23/24  1609 09/23/24  1314 09/23/24  1033   WBC AUTO x10*3/uL 10.9 8.3 8.8  --    HEMOGLOBIN g/dL 11.6* 11.1* 11.2*  --    HEMATOCRIT % 35.0* 33.7* 34.8*  --    PLATELETS AUTO x10*3/uL 154 150 145*  --    AST U/L 48* 45*  --  48*   ALT U/L 14 13  --  13   CREATININE mg/dL 0.68 0.65  --  0.74        Prenatal labs reviewed, remarkable for abnl gtt, dx with GDM.

## 2024-09-24 NOTE — ANESTHESIA PROCEDURE NOTES
Epidural Block    Start time: 9/24/2024 3:47 AM  End time: 9/24/2024 3:57 AM  Reason for block: labor analgesia  Staffing  Performed: CRNA   Authorized by: Tuan Fregoso MD PhD    Performed by: STEWART Lewis-CRNA    Preanesthetic Checklist  Completed: patient identified, IV checked, risks and benefits discussed, surgical consent, monitors and equipment checked, pre-op evaluation, timeout performed and sterile techniques followed  Block Timeout  RN/Licensed healthcare professional reads aloud to the Anesthesia provider and entire team: Patient identity, procedure with side and site, patient position, and as applicable the availability of implants/special equipment/special requirements.  Patient on coagulant treatment: no  Timeout performed at: 9/24/2024 3:47 AM  Block Placement  Patient position: sitting  Prep: ChloraPrep  Sterility prep: cap, drape, gloves, hand and mask  Sedation level: no sedation  Patient monitoring: blood pressure and continuous pulse oximetry  Approach: midline  Local numbing: lidocaine 1% to skin and subcutaneous tissues  Vertebral space: lumbar  Lumbar location: L3-L4  Epidural  Loss of resistance technique: saline  Guidance: landmark technique        Needle  Needle type: Tuohy   Needle gauge: 17  Needle length: 9 cm  Needle insertion depth: 5 cm  Catheter type: multi-orifice  Catheter size: 20 G  Catheter at skin depth: 10 cm  Catheter securement method: clear occlusive dressing and liquid medical adhesive    Test dose: lidocaine 1.5% with epinephrine 1-to-200,000  Test dose: lidocaine 1.5% with epinephrine 1-to-200,000  Test dose result: no positive test dose    PCEA  Medication concentration used: 0.044% Bupivacaine with 1.25 mcg/mL Fentanyl and [id number] Epinephrine  Dose (mL): 10  Lockout (minutes): 15  1-Hour Limit (boluses/hr): 4  Basal Rate: 14        Assessment  Block outcome: pain improved  Number of attempts: 1  Events: no positive test dose  Procedure assessment:  patient tolerated procedure well with no immediate complications  Additional Notes  First pass placement w/o issue.

## 2024-09-24 NOTE — PROGRESS NOTES
Patient seen and examined for labor check.  FHT: 145/mod ca/pos accel/neg decel  TOCO: q4-5min  CE: 1/30/-3  Plan: CRB attempted, cervix is too posterior. Placed another dose of cytotec and will re-evaluate. Cat I tracing.

## 2024-09-24 NOTE — SIGNIFICANT EVENT
At bedside to provide fetal scalp stimulation for minimal variability, with appropriate accel. Exam unchanged at 60/-2. IUPC in place. Discussed with patient that she has been on pitocin for 10.5 hours s/p SROM for mec. Discussed possibility of  if continues to not make change or if fetal status changes. Patient feels very tired and would like to re-discuss at 12hr lanette. Continues to be on 1L NC. CXR wnl. UOP adequate. No si/sx Mg toxicity.    Anne Marie Kyle MD PGY-2  Obstetrics & Gynecology

## 2024-09-24 NOTE — CARE PLAN
Problem: Vaginal Birth or  Section  Goal: Fetal and maternal status remain reassuring during the birth process  Outcome: Progressing  Goal: Tolerate CRB for IOL placement maintenance until dislodgement/removal 12hrs after placement  Outcome: Progressing  Goal: Prevention of malpresentation/labor dystocia through delivery  Outcome: Progressing  Goal: Demonstrates labor coping techniques through delivery  Outcome: Progressing  Goal: Minimal s/sx of HDP and BP<160/110  Outcome: Progressing  Goal: No s/sx of infection through recovery  Outcome: Progressing  Goal: No s/sx of hemorrhage through recovery  Outcome: Progressing     Problem: Pain - Adult  Goal: Verbalizes/displays adequate comfort level or baseline comfort level  Outcome: Progressing     Problem: Safety - Adult  Goal: Free from fall injury  Outcome: Progressing

## 2024-09-24 NOTE — SIGNIFICANT EVENT
Labor Progress Note    Subjective: Patient feeling increasingly short of breath. Denies headaches, vision changes, chest pain, or RUQ pain.      Objective:  Vitals: BP (!) 141/65   Pulse 92   Temp 36.6 °C (97.9 °F) (Oral)   Resp 18   LMP  (LMP Unknown)   SpO2 (!) 93%   Cervical Exam  Dilation: 1  Effacement (%): 60  Fetal Station: -2  Method: Manual  OB Examiner: Yoandy CHOW  Fetal Assessment  Movement: Present  Mode: External US  Baseline Fetal Heart Rate (bpm): 115 bpm  Baseline Classification: Normal  Variability: Moderate (Between 6 and 25 BPM)  Pattern: Accelerations  Pattern Observations: RN at bedside adjusting external US  FHR Category: Category I     IUPC placed for better tracing of contractions given on pitocin  Continue pitocin per protocol  CEFM, currently Category I  Epidural infusing  sPEC, new O2 req, now on 1L NC, lung exam diminished at bases, CXR ordered    Anne Marie Kyle MD PGY-2  Obstetrics & Gynecology

## 2024-09-24 NOTE — PROGRESS NOTES
Antepartum Progress Note    Assessment/Plan   Archana Mullen is a 34 y.o.  at 39w5d. RACHEL: 2024, by Est. Date of Conception.     Pre-Eclampsia with Severe features  -Patient is having worsening HA. Not relieved by tylenol. 10/10, quality of HA is her whole head. She is having blurred vision as well.  -Repeat HELLP labs have been ordered.   -Bps are stable.  -Patient has received 20 then 40 mg of Labetalol for severe range Bps at 11:00 am. They have been stable since then.   -Patient now has hyperreflexia (+3 upper and lower extremities), with 3 beats of clonus on dorsiflexion.  -Given the new development of HA and neurologic symptoms, will transport to [facility] for management of her labor.   -Patient did eat at 8 pm.  Discussed patient with Dr. Mcfarland. She is stable for transport at this time.    GDMA1  -Bgs stable. Most recent was 94.  -Growth at 34 2285 g 23%, AC 29%.    Assessment & Plan  Encounter for induction of labor    Pregnancy #2 Problems (from 24 to present)       Problem Noted Resolved    Encounter for induction of labor ([facility]) 2024 by MAURO Quiroz No    Priority:  Medium      Pregnancy resulting from assisted reproductive technology, antepartum ([facility]) 2024 by Alberto Walter MD No    Priority:  Medium      Overview Addendum 2024  5:20 PM by KATHERINE Wilson     US at 30 and 36w  Weekly NSTs at 34-35 wks   1 embryo in cryo, completed IVF at    Fetal echo discussed  and recommended especially in the setting of incomplete heart views (pt counseled while in ultrasound per Dr. Colby) --> completed  and WNL         Advanced maternal age, primigravida, antepartum ([facility]) 2024 by Alberto Walter MD No    Priority:  Medium      Overview Signed 2024 12:50 PM by KATHERINE Wilson     -rr cfDNA   -Serial growth   -Weekly  testing at 34 wks if not initiated sooner          Gestational diabetes mellitus (GDM),  antepartum ([facility]) 7/14/2024 by Alberto Walter MD No    Priority:  Medium      Overview Addendum 9/19/2024 10:38 AM by Beata Snyder, RN     -Shared Care with   - Attended Boot Camp 7/25  - MFM Consult completed 7/30  -MFM 36 wk visit pending   -Serial growth ultrasounds starting at 28 weeks    Last ultrasound:  8/20: EFW 23%, AC 29%, see report for full details   7/30: EFW 24%, AC 10%, 2VC, incomplete heart views (counseled in US per Dr. Colby re: new ultrasound findings    Fetal surveillance:   -Weekly at 34 wks     Current Regimen: nutrition-> BG log reviewed and more than 80% within goal range  07/30/24 nutrition   8/5/2024 Nutrition plan    8/12/2024 Nutrition plan  08/20/24 Nutrition plan  8/28/2024 :   9/3/2024 Nutrition plan    9/10/2024 Nutrition plan    9/19/2024 : nutrition             Delivery Plan: 39 wks pending continued BG control on nutrition plan  Intrapartum:  GDM protocol  Postpartum: No medication    Recommend PP 2hr gtt and q3yr F/U with PCP for A1C and TSH      IOL set for 39.4 weeks, per pt request         Maternal Crohn's disease affecting pregnancy, antepartum ([facility]) 8/23/2023 by Carolin Ledezma CMA No    Priority:  Medium      Overview Addendum 7/30/2024 12:41 PM by STEWART Wilson-CNP     Sees Dr. Angel Kirkland, GI  Entyvio 300 mg infusions every 8 weeks up until 34 weeks pregnant and then would hold infusions until after delivery   Last infusion 7/16--> plan for next infusion to be PP          Decreased fetal movements in third trimester ([facility]) 8/14/2024 by Shaka Richards MD 9/23/2024 by STEWART Quiroz-HAILYM    Priority:  Medium      Suspected fetal abnormality affecting management of mother ([facility]) 8/9/2024 by Clarita Taylor MD 9/23/2024 by STEWART Quiroz-CNM    Priority:  Medium      Maternal hypotension syndrome in third trimester ([facility]) 7/16/2024 by Bina Vazquez MD 9/9/2024 by Yariel Waddell MD    Priority:  Medium       34 weeks gestation of pregnancy ([facility]) 7/16/2024 by Bian Vazquez MD 9/23/2024 by STEWART Quiroz-JANENE    Priority:  Medium      Overview Signed 7/30/2024 12:50 PM by STEWART Wilson-CNP     -Primary OB: Dr. Waddell          12 weeks gestation of pregnancy ([facility]) 3/11/2024 by Aftab Swann, DO 4/11/2024 by Andreea John MA    Priority:  Medium              Subjective   Called into patient room for HA. Patient had a HA earlier today relieved by tylenol. This one is different. It involves her whole head with new onset blurred vision. She is feeling more nauseous. She has not had relief by Tylenol, Benadryl, or Reglan. No SOB. No RUQ pain at this time. She has had an episode of emesis.    Objective   Allergies:   Wellbutrin [bupropion hcl], Bupropion, and Penicillin v    Last Vitals:  Temp Pulse Resp BP MAP Pulse Ox   36.5 °C (97.7 °F) 94 18 (!) 147/77 106 96 %     Vitals Min/Max Last 24 Hours:  Temp  Min: 35.1 °C (95.2 °F)  Max: 36.7 °C (98.1 °F)  Pulse  Min: 72  Max: 100  Resp  Min: 17  Max: 19  BP  Min: 120/75  Max: 172/102  MAP (mmHg)  Min: 91  Max: 129    Intake/Output:     Intake/Output Summary (Last 24 hours) at 9/24/2024 0000  Last data filed at 9/23/2024 2308  Gross per 24 hour   Intake 1749.08 ml   Output 760 ml   Net 989.08 ml       Physical Exam:  GENERAL: Examination reveals a well developed, well nourished, gravid female  Patient is visibly in pain . She is alert and cooperative.  HEENT: PERRLA. External ears normal. Nose normal, no erythema or discharge. Mouth and throat clear.  NECK: supple, no significant adenopathy  LUNGS: clear to auscultation bilaterally  HEART: regular rate and rhythm, S1, S2 normal, no murmur, click, rub or gallop  ABDOMEN: soft, gravid, nontender, nondistended, no abnormal masses, no epigastric pain  CERVIX: 1 cm dilated, 30 % effaced, -3 station; MEMBRANES are Intact  EXTREMITIES: edema +2+  NEUROLOGICAL: clonus present lower extremities, Reflexes  +3 all extremities.    Lab Data:  Labs in chart were reviewed.

## 2024-09-24 NOTE — SIGNIFICANT EVENT
Decision for      Patient requesting elective . She states she is no longer wanting to undergo labor given her protracted her course. Discussed risks of  including increased blood loss and increased risk of infection and damage to surrounding structures. Patient still wanting to proceed with elective . Will move forward with non-scheduled, non-urgent . Pitocin turned off. Nursing and anesthesia teams made aware.     Seen and discussed w/ Dr. Brett Kyle MD PGY-2  Obstetrics & Gynecology

## 2024-09-24 NOTE — ANESTHESIA POSTPROCEDURE EVALUATION
Patient: Archana Mullen    Procedure Summary       Date: 24 Room / Location: [facility] 2 OB    Anesthesia Start: 347 Anesthesia Stop:     Procedure: OBGYN Delivery  Section Diagnosis: (Failure to Progress)    Surgeons: Jannette Noble MD Responsible Provider: Alley Dillon MD    Anesthesia Type: epidural ASA Status: 3            Anesthesia Type: epidural    Vitals Value Taken Time   /72 24   Temp 36.1 24   Pulse 87 24   Resp 15 24   SpO2 96 24       Anesthesia Post Evaluation    Patient location during evaluation: PACU  Patient participation: complete - patient participated  Level of consciousness: awake and alert  Pain score: 0  Pain management: adequate  Airway patency: patent  Cardiovascular status: acceptable  Respiratory status: acceptable and nasal airway  Hydration status: acceptable  Postoperative Nausea and Vomiting: none        No notable events documented.

## 2024-09-24 NOTE — PROGRESS NOTES
Intrapartum Progress Note    Assessment/Plan   Archana Mullen is a 34 y.o.  at 39w5d. RACHEL: 2024, by Est. Date of Conception.     IOL  -in s/o sPEC, t/f [facility]  -s/p cyto x4 at [facility], unable to place CRB  -s/p SROM for mec at 1300 on   -continue pitocin per protocol  -Epidural infusing    sPEC  -dx by sustained severe range BP requiring IV treatment   -s/p labetalol 20/40 at [facility], labetalol 20/40 ( @ 0710)  -PO regimen: nifed 60 daily   -Mg, no si/sx Mg toxicity, UOP adequate   -HELLP labs neg x2, P:C 2.23  -HA now resolved     Conversion Disorder   -dx in 2018  -experiences shaking when she is stressed. She is conscious during these episodes, able to breath through them.   -1x episode at [facility]  -Occurred while [place] was [place] [facility] patient was responsive throughout and remembers the event  -no concern for seizures at this time      Fetal Status  -cat 1 fetal tracing, CEFM  -Presentation cephalic based on cervical exam  -GBS negative     Pregnancy Notables  -A1DM   -Chron's disease, on Entyvio 300mg infusion, last infusion , plan for next infusion PP  -anxiety/depression, on lexapro  -conversion d/o with sz or convulsions, shaking when very stressed, conscious but doesn't talk during episodes  -AMA 34y.o.   -IVF pregnancy      -Postpartum Contraception Plan: declines ( w male factor infertility)    Seen and discussed w/ Dr. Brett Kyle MD PGY-2  Obstetrics & Gynecology    Assessment & Plan  Severe pre-eclampsia complicating childbirth ([facility])    Pregnancy #2 Problems (from 24 to present)       Problem Noted Resolved    Severe pre-eclampsia complicating childbirth ([facility]) 2024 by Raina Donnelly MD No    Priority:  Medium      Encounter for induction of labor ([facility]) 2024 by STEWART Quiroz-CN No    Priority:  Medium      Pregnancy resulting from assisted reproductive technology, antepartum ([facility]) 2024 by Alberto Walter MD No     Priority:  Medium      Overview Addendum 2024  5:20 PM by STEWART Wilson-CNP     US at 30 and 36w  Weekly NSTs at 34-35 wks   1 embryo in cryo, completed IVF at    Fetal echo discussed  and recommended especially in the setting of incomplete heart views (pt counseled while in ultrasound per Dr. Colby) --> completed  and WNL         Advanced maternal age, primigravida, antepartum ([facility]) 2024 by Alberto Walter MD No    Priority:  Medium      Overview Signed 2024 12:50 PM by STEWART Wilson-CNP     -rr cfDNA   -Serial growth   -Weekly  testing at 34 wks if not initiated sooner          Gestational diabetes mellitus (GDM), antepartum ([facility]) 2024 by Alberto Walter MD No    Priority:  Medium      Overview Addendum 2024 10:38 AM by Beata Snyder RN     -Shared Care with   - Attended Boot Camp   - MFM Consult completed   -MFM 36 wk visit pending   -Serial growth ultrasounds starting at 28 weeks    Last ultrasound:  : EFW 23%, AC 29%, see report for full details   : EFW 24%, AC 10%, 2VC, incomplete heart views (counseled in US per Dr. Colby re: new ultrasound findings    Fetal surveillance:   -Weekly at 34 wks     Current Regimen: nutrition-> BG log reviewed and more than 80% within goal range  24 nutrition   2024 Nutrition plan    2024 Nutrition plan  24 Nutrition plan  2024 :   9/3/2024 Nutrition plan    9/10/2024 Nutrition plan    2024 : nutrition             Delivery Plan: 39 wks pending continued BG control on nutrition plan  Intrapartum:  GDM protocol  Postpartum: No medication    Recommend PP 2hr gtt and q3yr F/U with PCP for A1C and TSH      IOL set for 39.4 weeks, per pt request         Maternal Crohn's disease affecting pregnancy, antepartum ([facility]) 2023 by Carolin Ledezma CMA No    Priority:  Medium      Overview Addendum 2024 12:41 PM by Mary Anne Diop, STEWART-CNP     Sees  Dr. Angel Kirkland, GI  Entyvio 300 mg infusions every 8 weeks up until 34 weeks pregnant and then would hold infusions until after delivery   Last infusion 7/16--> plan for next infusion to be PP          Decreased fetal movements in third trimester ([facility]) 8/14/2024 by Shaka Richards MD 9/23/2024 by STEWART Quiroz-JANENE    Priority:  Medium      Suspected fetal abnormality affecting management of mother ([facility]) 8/9/2024 by Clarita Taylor MD 9/23/2024 by STEWART Quiroz-JANENE    Priority:  Medium      Maternal hypotension syndrome in third trimester ([facility]) 7/16/2024 by Bina Vazquez MD 9/9/2024 by Yariel Waddell MD    Priority:  Medium      34 weeks gestation of pregnancy ([facility]) 7/16/2024 by Bina Vazquez MD 9/23/2024 by STEWART Quiroz-JANENE    Priority:  Medium      Overview Signed 7/30/2024 12:50 PM by STEWART Wilson-CNP     -Primary OB: Dr. Waddell          12 weeks gestation of pregnancy ([facility]) 3/11/2024 by Aftab Swann DO 4/11/2024 by Andreea John MA    Priority:  Medium              Subjective   Patient reports feeling much better this morning. Denies headaches, vision changes, chest pain, shortness of breath, or RUQ pain.    Objective   Last Vitals:  Temp Pulse Resp BP MAP Pulse Ox   36.9 °C (98.4 °F) 90 16 (!) 158/88 113 98 %     Vitals Min/Max Last 24 Hours:  Temp  Min: 35.1 °C (95.2 °F)  Max: 37.4 °C (99.3 °F)  Pulse  Min: 72  Max: 118  Resp  Min: 16  Max: 19  BP  Min: 120/75  Max: 176/97  MAP (mmHg)  Min: 91  Max: 129    Intake/Output:    Intake/Output Summary (Last 24 hours) at 9/24/2024 0706  Last data filed at 9/24/2024 0731  Gross per 24 hour   Intake 785.3 ml   Output 500 ml   Net 285.3 ml       Physical Examination:  GENERAL: Examination reveals a well developed, well nourished, gravid female in no acute distress. She is alert and cooperative.  HEENT: External ears normal. Nose normal, no erythema or discharge. Mouth and throat  clear.  NECK: supple, no significant adenopathy  LUNGS: Normal respiratory effort  HEART: RRR  ABDOMEN: Gravid  EXTREMITIES: no limitation in range of motion  SKIN: normal coloration and turgor, no rashes  NEUROLOGICAL: alert, oriented, normal speech, no focal findings or movement disorder noted  PSYCHOLOGICAL: awake and alert; oriented to person, place, and time    Lab Review:  Lab Results   Component Value Date    WBC 10.9 09/24/2024    HGB 11.3 (L) 09/24/2024    HCT 34.5 (L) 09/24/2024     09/24/2024     Lab Results   Component Value Date    GLUCOSE 98 09/24/2024     (L) 09/24/2024    K 4.2 09/24/2024     09/24/2024    CO2 18 (L) 09/24/2024    ANIONGAP 18 09/24/2024    BUN 12 09/24/2024    CREATININE 0.75 09/24/2024    EGFR >90 09/24/2024    CALCIUM 7.3 (L) 09/24/2024    ALBUMIN 3.3 (L) 09/24/2024    PROT 5.9 (L) 09/24/2024    ALKPHOS 195 (H) 09/24/2024    ALT 15 09/24/2024    AST 43 (H) 09/24/2024    BILITOT 0.3 09/24/2024

## 2024-09-24 NOTE — ANESTHESIA PREPROCEDURE EVALUATION
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 Per pt last seizure occurrence was one year ago.     (+) Anxiety and depression      GI   (+) IBS (irritable bowel syndrome)   (+) Maternal Crohn's disease affecting pregnancy, antepartum ([facility])      /Renal (within normal limits)      Liver (within normal limits)      Endocrine   (+) Gestational diabetes mellitus (GDM), antepartum ([facility])      Musculoskeletal  Per neurology note from 2018 history of bulging of lumbar intervertebral disc without myelopathy      GYN   (+) Pregnancy resulting from assisted reproductive technology, antepartum ([facility])       Clinical information reviewed:    Allergies                NPO Detail: Last meal 9/23 at 0730  No data recorded     OB/Gyn Evaluation    Present Pregnancy    Patient is pregnant now.  (+) , gestational diabetes   Obstetric History                Physical Exam    Airway  Mallampati: II  TM distance: >3 FB  Neck ROM: full     Cardiovascular   Rate: normal     Dental    Pulmonary    Abdominal            Anesthesia Plan    History of general anesthesia?: yes  History of complications of general anesthesia?: no    ASA 3     epidural   (I informed and discussed the risks and benefits of general, spinal and epidural anesthesia with the patient.  The patient expressed her understanding and her questions were answered.  A verbal consent was given by the patient.   )  The patient is not a current smoker.  Patient did not smoke on day of procedure.    Anesthetic plan and risks discussed with patient.  Use of  blood products discussed with patient who consented to blood products.    Plan discussed with CRNA.

## 2024-09-24 NOTE — SIGNIFICANT EVENT
Cervical Exam  Dilation: 1  Effacement (%): 60  Fetal Station: -2  Method: Manual  OB Examiner: Yoandy CHOW  Fetal Assessment  Movement: Present  Mode: External US  Baseline Fetal Heart Rate (bpm): 115 bpm  Baseline Classification: Normal  Variability: Moderate (Between 6 and 25 BPM)  Pattern: Accelerations  FHR Category: Category I      Contraction Frequency: 3-5     To bedside, patient feeling increased pressure. /mod ca/- accels/- decels, toco q2-4, difficulty tracing contractions but patient feeling increased pressure with ctxs. FHR cat 1, pitocin started at 0440, SROM prior, unsure timing but on arrival to [facility] grossly ruptured for meconium stained fluid, around 0100     Patient on 1L O2, satting 98%, lungs clear on auscultation. will wean. If unable to wean or develops crackles on exam will order CXR     Continue pitocin per protocol   Continue Mg gtt for seizure prophylaxis sPEC   Zoë Pitt MD  OB/GYN PGY4

## 2024-09-25 LAB
ERYTHROCYTE [DISTWIDTH] IN BLOOD BY AUTOMATED COUNT: 15.1 % (ref 11.5–14.5)
GLUCOSE BLD MANUAL STRIP-MCNC: 124 MG/DL (ref 74–99)
HCT VFR BLD AUTO: 26.1 % (ref 36–46)
HGB BLD-MCNC: 8.4 G/DL (ref 12–16)
MAGNESIUM SERPL-MCNC: 8.77 MG/DL (ref 1.6–2.4)
MCH RBC QN AUTO: 31.6 PG (ref 26–34)
MCHC RBC AUTO-ENTMCNC: 32.2 G/DL (ref 32–36)
MCV RBC AUTO: 98 FL (ref 80–100)
NRBC BLD-RTO: 0 /100 WBCS (ref 0–0)
PLATELET # BLD AUTO: 130 X10*3/UL (ref 150–450)
RBC # BLD AUTO: 2.66 X10*6/UL (ref 4–5.2)
WBC # BLD AUTO: 14.1 X10*3/UL (ref 4.4–11.3)

## 2024-09-25 PROCEDURE — 99199 UNLISTED SPECIAL SVC PX/RPRT: CPT

## 2024-09-25 PROCEDURE — 2500000004 HC RX 250 GENERAL PHARMACY W/ HCPCS (ALT 636 FOR OP/ED)

## 2024-09-25 PROCEDURE — 2500000001 HC RX 250 WO HCPCS SELF ADMINISTERED DRUGS (ALT 637 FOR MEDICARE OP)

## 2024-09-25 PROCEDURE — 85027 COMPLETE CBC AUTOMATED: CPT | Performed by: STUDENT IN AN ORGANIZED HEALTH CARE EDUCATION/TRAINING PROGRAM

## 2024-09-25 PROCEDURE — 36415 COLL VENOUS BLD VENIPUNCTURE: CPT | Performed by: STUDENT IN AN ORGANIZED HEALTH CARE EDUCATION/TRAINING PROGRAM

## 2024-09-25 PROCEDURE — 82947 ASSAY GLUCOSE BLOOD QUANT: CPT

## 2024-09-25 PROCEDURE — 2500000005 HC RX 250 GENERAL PHARMACY W/O HCPCS

## 2024-09-25 PROCEDURE — 83735 ASSAY OF MAGNESIUM: CPT | Performed by: STUDENT IN AN ORGANIZED HEALTH CARE EDUCATION/TRAINING PROGRAM

## 2024-09-25 PROCEDURE — 2500000002 HC RX 250 W HCPCS SELF ADMINISTERED DRUGS (ALT 637 FOR MEDICARE OP, ALT 636 FOR OP/ED)

## 2024-09-25 PROCEDURE — 1210000001 HC SEMI-PRIVATE ROOM DAILY

## 2024-09-25 RX ORDER — HYDROMORPHONE HYDROCHLORIDE 1 MG/ML
0.2 INJECTION, SOLUTION INTRAMUSCULAR; INTRAVENOUS; SUBCUTANEOUS EVERY 5 MIN PRN
Status: DISCONTINUED | OUTPATIENT
Start: 2024-09-25 | End: 2024-09-27 | Stop reason: HOSPADM

## 2024-09-25 RX ORDER — TRANEXAMIC ACID 100 MG/ML
1000 INJECTION, SOLUTION INTRAVENOUS ONCE AS NEEDED
Status: DISCONTINUED | OUTPATIENT
Start: 2024-09-25 | End: 2024-09-27 | Stop reason: HOSPADM

## 2024-09-25 RX ORDER — ADHESIVE BANDAGE
10 BANDAGE TOPICAL
Status: DISCONTINUED | OUTPATIENT
Start: 2024-09-25 | End: 2024-09-27 | Stop reason: HOSPADM

## 2024-09-25 RX ORDER — OXYTOCIN/0.9 % SODIUM CHLORIDE 30/500 ML
60 PLASTIC BAG, INJECTION (ML) INTRAVENOUS ONCE AS NEEDED
Status: DISCONTINUED | OUTPATIENT
Start: 2024-09-25 | End: 2024-09-27 | Stop reason: HOSPADM

## 2024-09-25 RX ORDER — MISOPROSTOL 200 UG/1
800 TABLET ORAL ONCE AS NEEDED
Status: DISCONTINUED | OUTPATIENT
Start: 2024-09-25 | End: 2024-09-27 | Stop reason: HOSPADM

## 2024-09-25 RX ORDER — ENOXAPARIN SODIUM 100 MG/ML
40 INJECTION SUBCUTANEOUS EVERY 24 HOURS
Status: DISCONTINUED | OUTPATIENT
Start: 2024-09-25 | End: 2024-09-27 | Stop reason: HOSPADM

## 2024-09-25 RX ORDER — NIFEDIPINE 10 MG/1
10 CAPSULE ORAL ONCE AS NEEDED
Status: DISCONTINUED | OUTPATIENT
Start: 2024-09-25 | End: 2024-09-27 | Stop reason: HOSPADM

## 2024-09-25 RX ORDER — ONDANSETRON HYDROCHLORIDE 2 MG/ML
4 INJECTION, SOLUTION INTRAVENOUS EVERY 6 HOURS PRN
Status: DISCONTINUED | OUTPATIENT
Start: 2024-09-25 | End: 2024-09-27 | Stop reason: HOSPADM

## 2024-09-25 RX ORDER — ONDANSETRON 4 MG/1
4 TABLET, FILM COATED ORAL EVERY 6 HOURS PRN
Status: DISCONTINUED | OUTPATIENT
Start: 2024-09-25 | End: 2024-09-27 | Stop reason: HOSPADM

## 2024-09-25 RX ORDER — OXYCODONE HYDROCHLORIDE 5 MG/1
5 TABLET ORAL EVERY 4 HOURS PRN
Status: DISCONTINUED | OUTPATIENT
Start: 2024-09-25 | End: 2024-09-27 | Stop reason: HOSPADM

## 2024-09-25 RX ORDER — LIDOCAINE 560 MG/1
1 PATCH PERCUTANEOUS; TOPICAL; TRANSDERMAL
Status: DISCONTINUED | OUTPATIENT
Start: 2024-09-25 | End: 2024-09-27 | Stop reason: HOSPADM

## 2024-09-25 RX ORDER — FENTANYL/ROPIVACAINE/NS/PF 2MCG/ML-.2
0-25 PLASTIC BAG, INJECTION (ML) INJECTION CONTINUOUS
Status: DISCONTINUED | OUTPATIENT
Start: 2024-09-25 | End: 2024-09-27 | Stop reason: HOSPADM

## 2024-09-25 RX ORDER — METHYLERGONOVINE MALEATE 0.2 MG/ML
0.2 INJECTION INTRAVENOUS ONCE AS NEEDED
Status: DISCONTINUED | OUTPATIENT
Start: 2024-09-25 | End: 2024-09-27 | Stop reason: HOSPADM

## 2024-09-25 RX ORDER — FERROUS SULFATE 325(65) MG
65 TABLET ORAL
Status: DISCONTINUED | OUTPATIENT
Start: 2024-09-26 | End: 2024-09-27 | Stop reason: HOSPADM

## 2024-09-25 RX ORDER — LOPERAMIDE HYDROCHLORIDE 2 MG/1
4 CAPSULE ORAL EVERY 2 HOUR PRN
Status: DISCONTINUED | OUTPATIENT
Start: 2024-09-25 | End: 2024-09-27 | Stop reason: HOSPADM

## 2024-09-25 RX ORDER — CARBOPROST TROMETHAMINE 250 UG/ML
250 INJECTION, SOLUTION INTRAMUSCULAR ONCE AS NEEDED
Status: DISCONTINUED | OUTPATIENT
Start: 2024-09-25 | End: 2024-09-27 | Stop reason: HOSPADM

## 2024-09-25 RX ORDER — OXYCODONE HYDROCHLORIDE 10 MG/1
10 TABLET ORAL EVERY 4 HOURS PRN
Status: DISCONTINUED | OUTPATIENT
Start: 2024-09-25 | End: 2024-09-27 | Stop reason: HOSPADM

## 2024-09-25 RX ORDER — SIMETHICONE 80 MG
80 TABLET,CHEWABLE ORAL 4 TIMES DAILY PRN
Status: DISCONTINUED | OUTPATIENT
Start: 2024-09-25 | End: 2024-09-27 | Stop reason: HOSPADM

## 2024-09-25 RX ORDER — OXYTOCIN 10 [USP'U]/ML
10 INJECTION, SOLUTION INTRAMUSCULAR; INTRAVENOUS ONCE AS NEEDED
Status: DISCONTINUED | OUTPATIENT
Start: 2024-09-25 | End: 2024-09-27 | Stop reason: HOSPADM

## 2024-09-25 RX ORDER — HYDROMORPHONE HYDROCHLORIDE 1 MG/ML
0.2 INJECTION, SOLUTION INTRAMUSCULAR; INTRAVENOUS; SUBCUTANEOUS EVERY 5 MIN PRN
Status: DISCONTINUED | OUTPATIENT
Start: 2024-09-25 | End: 2024-09-25 | Stop reason: HOSPADM

## 2024-09-25 RX ORDER — POLYETHYLENE GLYCOL 3350 17 G/17G
17 POWDER, FOR SOLUTION ORAL 2 TIMES DAILY PRN
Status: DISCONTINUED | OUTPATIENT
Start: 2024-09-25 | End: 2024-09-27 | Stop reason: HOSPADM

## 2024-09-25 RX ORDER — HYDRALAZINE HYDROCHLORIDE 20 MG/ML
5 INJECTION INTRAMUSCULAR; INTRAVENOUS ONCE AS NEEDED
Status: DISCONTINUED | OUTPATIENT
Start: 2024-09-25 | End: 2024-09-27 | Stop reason: HOSPADM

## 2024-09-25 RX ORDER — BISACODYL 10 MG/1
10 SUPPOSITORY RECTAL DAILY PRN
Status: DISCONTINUED | OUTPATIENT
Start: 2024-09-25 | End: 2024-09-27 | Stop reason: HOSPADM

## 2024-09-25 RX ORDER — NALOXONE HYDROCHLORIDE 0.4 MG/ML
0.1 INJECTION, SOLUTION INTRAMUSCULAR; INTRAVENOUS; SUBCUTANEOUS EVERY 5 MIN PRN
Status: DISCONTINUED | OUTPATIENT
Start: 2024-09-25 | End: 2024-09-27 | Stop reason: HOSPADM

## 2024-09-25 RX ORDER — DIPHENHYDRAMINE HYDROCHLORIDE 50 MG/ML
25 INJECTION INTRAMUSCULAR; INTRAVENOUS EVERY 4 HOURS PRN
Status: DISCONTINUED | OUTPATIENT
Start: 2024-09-25 | End: 2024-09-27 | Stop reason: HOSPADM

## 2024-09-25 RX ORDER — LABETALOL HYDROCHLORIDE 5 MG/ML
20 INJECTION, SOLUTION INTRAVENOUS ONCE AS NEEDED
Status: DISCONTINUED | OUTPATIENT
Start: 2024-09-25 | End: 2024-09-27 | Stop reason: HOSPADM

## 2024-09-25 RX ORDER — DIPHENHYDRAMINE HCL 25 MG
25 CAPSULE ORAL EVERY 4 HOURS PRN
Status: DISCONTINUED | OUTPATIENT
Start: 2024-09-25 | End: 2024-09-27 | Stop reason: HOSPADM

## 2024-09-25 ASSESSMENT — PAIN SCALES - GENERAL
PAINLEVEL_OUTOF10: 0 - NO PAIN
PAINLEVEL_OUTOF10: 0 - NO PAIN
PAINLEVEL_OUTOF10: 3

## 2024-09-25 NOTE — PROGRESS NOTES
Postpartum Magnesium Note    Assessment/Plan    Archana Mullen is a 34 y.o.  now POD #1 s/p PLTCS at 39w5d for elective primary  section in the setting of Preeclampsia with Severe Features    Preeclampsia with Severe Features  Dx by SRBPs that required IV treatment   IV Antihypertensives req: L20/40 at OSH  PEC labs neg x2; P:C 2.23  Current Antihypertensive regimen: Nifedipine 60 daily  BPc: normotensive  Mag turned off at 1600 due to concern for Mg toxicity, labs notable for Mg 8.77. Will discontinue Mag given received ~ 22.5 hours of Mag s/p delivery and supra therapeutic.   Patiently previously on O2, now on RA, asymptomatic, s/p CXR neg    Routine post-op care  Pain well controlled, bleeding mild  Admit Hgb 11.3 > > EBL 1400 > 9.3 (post-op) > POD#1 Hgb 8.4, acute blood loss anemia, will start PO iron  PPFP: Declines    Dispo: stable for transfer to PP floor    Seen and discussed with Dr. Moises Candelaria MD PGY-3    Subjective    Patient denies HA, vision changes, RUQ pain. Denies chest pain, SOB.     Objective    Visit Vitals  /67   Pulse 79   Temp 36.8 °C (98.2 °F) (Temporal)   Resp 18   LMP  (LMP Unknown)   SpO2 97%   Breastfeeding Unknown   OB Status Recent pregnancy   Smoking Status Former        Physical Examination  Lungs: clear to auscultation bilaterally, no wheezes, rhonchi, crackles   Heart: regular rate and rhythm, normal S1 and S2   Abd: soft. fundus firm below umbilicus, abdominal incision covered with bandage, no strikethrough present  Ext: no edema or pain  Neuro: DTR 2+ bilaterally in UE    Lab Review  Results from last 7 days   Lab Units 24  0913 24  2240 24  0518 24  0023 24  1609   WBC AUTO x10*3/uL 14.1* 14.5* 10.9 10.9 8.3   HEMOGLOBIN g/dL 8.4* 9.3* 11.3* 11.6* 11.1*   HEMATOCRIT % 26.1* 28.7* 34.5* 35.0* 33.7*   PLATELETS AUTO x10*3/uL 130* 137* 153 154 150   AST U/L  --   --  43* 48* 45*   ALT U/L  --   --  15 14 13    CREATININE mg/dL  --   --  0.75 0.68 0.65       I/O this shift:  In: 743.9 [I.V.:743.9]  Out: 1087 [Urine:1087]     Intake/Output Summary (Last 24 hours) at 9/25/2024 1838  Last data filed at 9/25/2024 1529  Gross per 24 hour   Intake 3725.89 ml   Output 3505 ml   Net 220.89 ml

## 2024-09-25 NOTE — LACTATION NOTE
Lactation Consultant Note  Lactation Consultation  Reason for Consult: Initial assessment, NICU baby, Other (Comment) (called by bedside RN to see patient in L&D for assistance with pumping)  Consultant Name: Aileen Ritter, FOREST, IBCLC    Maternal Information  Has mother  before?: No  Infant to breast within first 2 hours of birth?: No  Breastfeeding Delayed Due to: Infant status  Exclusive Pump and Bottle Feed: No    Maternal Assessment  Breast Assessment: Symmetrical, Medium, Compressible  Nipple Assessment: Intact, Short, Erect with stimulation  Areola Assessment: Other (Comment) (edematous on the areolas- showed reverse pressure softening)    Infant Assessment  Infant Behavior: Other (Comment) (baby in the NICU)    Feeding Assessment  Nutrition Source: Breastmilk, Donor human milk  Unable to assess infant feeding at this time: Other (Comment) (baby in the NICU)    LATCH TOOL       Breast Pump  Pump: Hospital grade electric pump, Double breast pumping, Massage  Frequency: 8-10 times per day  Duration: Initiate phase  Breast Shield Size and Type: 21 mm, Other (comment) (she measures to be 19 MM diameter nipples bilaterally- advised touse 21 MM)    Other OB Lactation Tools       Patient Follow-up  Inpatient Lactation Follow-up Needed : Yes  Outpatient Lactation Follow-up: Recommended    Other OB Lactation Documentation  Maternal Risk Factors: Age over 30, primiparity, Preeclampsia, Diabetes (gestational, types 1 or 2), Other (comment), Extreme tiredness, fatigue or stress,  delivery (history oif anxiety and deperession , seperation from baby (NICU))  Infant Risk Factors: Infant Apgar <8, Early term birth 37-39 weeks    Recommendations/Summary  Called down to L&D to assist mom with pumping per bedside RN.     Mom stated she was concerned that she wasn't able to pump anything out of her breasts and baby was born last evening.     Reviewed the difference between latching and pumping, the  benefit of skin to skin, the benefits of breast massage prior to pumping, expectations of volume with pumping, milk storage and cleaning of pump parts.   Measured her nipples to be 19 MM- advised her to use 20 MM/ 21 MM or 22 MM breast flanges. We have 21 MM and I set her up with theses.     Re-Oriented mom to pump set up- use- and cleaning of pump parts.     Encouraged her to pump every 3 hours (8-12 times in a 24 hour period) for 20 minutes on both breasts and to give the baby any pumped breast milk prior to any use of supplement.      Will give all PI sheets and other educational information/ outpatient lactation resources tomorrow when she is off MAGSO4 and up on the postpartum floor.     Report to bedside RN.     Questions answered.

## 2024-09-25 NOTE — INDIVIDUALIZED OVERALL PLAN OF CARE NOTE
Check in Note    S) Patient resting in bed comfortably. Denies headache, visual disturbances, RUQ pain, chest pain, SOB.     O)  Blood pressure 102/56, pulse 80, temperature 36.1 °C (97 °F), temperature source Temporal, resp. rate 18, SpO2 (!) 94%, unknown if currently breastfeeding.      A&P)  34 y.o.  now POD #1 s/p PLTCS at 39w5d for elective primary  section in the setting of Preeclampsia with Severe Features     Preeclampsia with Severe Features  Dx by SRBPs that required IV treatment   IV Antihypertensives req: L20/40 at OSH  PEC labs neg x2; P:C 2.23  On Nifedipine 60 daily  BPc: normotensive  Mg until 1830, UOP normal  Patiently previously on O2, now on RA, asymptomatic, s/p CXR neg     Routine post-op care  Pain well controlled, bleeding mild  PPBC: Declines  Admit Hgb 11.3 > > EBL 1400 > 9.3 (post-op)   Follow-up POD 1 CBC  O positive  DVT ppx: Lovenox 40 mg, SCDs, ambulation    Lana Blancas MD PGY2

## 2024-09-25 NOTE — L&D DELIVERY NOTE
OB Delivery Note  2024  Archana Mullen  34 y.o.   , Low Transverse     Date: 2024  OR Location: [facility] OB    Name: Archana Mullen, : 1990, Age: 34 y.o., MRN: [id number], Sex: female    Diagnosis  Preop diagnosis  IUP at 39.5 wga  sPEC  A1DM Postoperative diagnosis  Same     Procedures    * OBGYN Delivery  Section    Surgeons      * Jannette Noble - Primary    Resident/Fellow/Other Assistant:  Surgeons and Role:  Jacqui Candelaria MD PGY-3    Procedure Summary  Anesthesia: Epidural  ASA: III  Anesthesia Staff: Anesthesiologist: William Rocha MD; Alley Dillon MD; Tuan Fregoso MD PhD  CRNA: STEWART Lewis-CRNA  C-AA: JOVANNY Alatorre  Anesthesia Resident: Moody Fam DO  Estimated Blood Loss: 1300 mL  Intra-op Medications: * Intraprocedure medication information is unavailable because the case start and end events have not been set *           Anesthesia Record               Intraprocedure I/O Totals          Intake    Magnesium Sulfate 692.00 mL    The total shown is the total volume documented since Anesthesia Start was filed.    LR 1500.00 mL    Oxytocin Drip 155.00 mL    The total shown is the total volume documented since Anesthesia Start was filed.    fentaNYL (PF)-BUPivacaine - Epi 1.25 mcg/mL- 0.044 % 165.00 mL    Total Intake 2512 mL       Output    Urine 100 mL    Total Blood Loss - Surgical Delivery (mL) 1300 mL    Total Output 1400 mL       Net    Net Volume 1112 mL       Other (could not be determined as input or output)    Surgical Delivery Estimated Blood Loss (mL) 1300          Specimen: No specimens collected     Staff:   Circulator: Mery  Scrub Person: Lorrie      Indications: 33 yo  at 39.5 wga admitted for IOL in setting of sPEC. Patient now 12 hours s/p pit/AROM with no cervical change and electing to proceed with  section.    Procedure Details:    Findings: Normal appearing gravid uterus, fallopian tubes, and  ovaries. Amniotic fluid clear, female infant in cephalic presentation      Procedure: Patient was taken to the OR where epidural anesthesia was redosed.  She was then placed in the dorsal supine position with a left lateral tilt. A paniagua catheter was placed, SCDs were applied, and a vaginal prep was performed. A pre-procedure time out was performed.  The patient was given prophylactic dose of IV antibiotics. She was then prepped and draped in the usual sterile fashion. A Pfannenstiel skin incision was made through the skin with the scalpel and then carried through the subcutaneous fat to the underlying fascial layer with sharp dissection. The fascia was incised on either side of the midline with the scalpel, and fascia was then dissected off the rectus muscle bilaterally using sharp dissection with curved Hawley scissors. The superior aspect of the incision was grasped, tented up with Kocher clamps and the rectus muscle was dissected off bluntly. The muscles were divided in the midline, the peritoneum was identified and then entered bluntly, and incision extended superiorly and inferiorly with good visualization of bladder below. Bladder blade was inserted. A low transverse uterine incision was made with the scalpel, the uterine cavity was entered, and the hysterotomy was extended bilaterally with cephalocaudal stretching. The infant was delivered atraumatically, the cord was clamped and cut, and infant was handed off to the awaiting nursing staff. A cord segment and blood sample were collected. The placenta was then expressed. The uterus was exteriorized and cleared of all clot and debris. The uterine incision was repaired using a running stitch of 0-Vicryl. A second layer of the same suture was placed in an imbricating fashion. Hemostasis was noted. The uterus was then placed back inside the pelvis, the gutters were cleared of all clots and debris. The hysterotomy was again evaluated and found to be hemostatic, and  the underside of the fascia and bladder and the rectus muscles were also found to be hemostatic. The fascia was closed using a running stitch of 0-Vicryl. The subcutaneous layer was irrigated, small bleeding vessels were cauterized, and the subcutaneous layer was reapproximated using a running stitch of 2-0 Monocryl. The skin was closed with 4-0 Monocryl on a curved needle.  All counts were correct, the patient tolerated the procedure well. Dr. Noble was present for all key portions of the procedure. The patient was taken back to the recovery room in stable condition.    Gestational Age: 39w5d  /Para:   Quantitative Blood Loss: Admission to Discharge: 1342 mL (2024  2:07 AM - 2024  8:36 PM)    Evelyn Mullen [[id number]]      Labor Events    Rupture date/time: 2024 0100  Rupture type: Spontaneous  Fluid color: Meconium  Fluid odor: None  Labor type: Induced Onset of Labor  Labor allowed to proceed with plans for an attempted vaginal birth?: Yes  Induction: Misoprostol  First cervical ripening date/time: 2024 0958  Induction date/time: 2024 0800  Induction indications: Hypertensive Disorder of Pregnancy, Risk Reducing  Complications: Failure to Progress in First Stage       Labor Event Times    Labor onset date/time: 2024 0207       Placenta    Placenta delivery date/time: 2024 1834  Placenta removal: Expressed  Placenta appearance: Intact  Placenta disposition: pathology       Cord    Vessels: 3 vessels  Complications: Nuchal  Nuchal intervention: reduced  Nuchal cord description: loose nuchal cord  Number of loops: 1  Delayed cord clamping?: Yes  Cord clamped date/time: 2024 18:30:00  Gases sent?: Yes       Lacerations    Episiotomy: None  Perineal laceration: None  Other lacerations?: No  Repair suture: None       Anesthesia    Method: Combined spinal-epidural       Operative Delivery    Forceps attempted?: No  Vacuum extractor attempted?: No        Shoulder Dystocia    Shoulder dystocia present?: No        Delivery    Time head delivered: 2024 18:30:00  Birth date/time: 2024 18:30:00  Delivery type: , Low Transverse   categorization: primary   priority: routine  Indications for : Failure to Progress  Incision type: low transverse  Complications: Failure to Progress in First Stage       Resuscitation    Method: Suctioning, Positive pressure ventilation (PPV), Continuous positive airway pressure (CPAP), Tactile stimulation       Apgars    Living status: Living  Apgar Component Scores:  1 min.:  5 min.:  10 min.:  15 min.:  20 min.:    Skin color:  1  2       Heart rate:  1  1       Reflex irritability:  1  1       Muscle tone:  1  1       Respiratory effort:  0  2       Total:  4  7       Apgars assigned by: GENEVIEVE CHOW       Delivery Providers    Delivering clinician: Jannette Noble MD   Provider Role    Mery Mobley RN Delivery Nurse    Lorrie Seals RN Nursery Nurse    Jacqui Candelaria MD Resident                     Jacqui Candelaria MD

## 2024-09-25 NOTE — LACTATION NOTE
This note was copied from a baby's chart.  Lactation Consultant Note  Lactation Consultation   Es Wills, RN IBCLC    Maternal Information   Mom remains in L&D on Magnesium drip.  Will not be able to visit NICU until later this evening.    Recommendations/Summary       I spoke with FOB at pt's bedside to explained availability of Lactation Consult services. Provided written patient education instruction materials on the listed topics: [place], Benefits of mother's own milk for the infant, breast massage and hand expression,CDC pump cleaning & sanitizing guidelines.  Baby may be able to rejoin mom if she tolerates her wean from dextrose IV.  Dad reports that mom has been pumping at [facility]. Her plan is to directly breastfeed the baby.  FOB was invited to have mom contact LC services as needed.

## 2024-09-25 NOTE — PROGRESS NOTES
Postpartum Magnesium Note    Assessment/Plan    Archana Mullen is a 34 y.o.  now POD #1 s/p PLTCS at 39w5d for elective primary  section in the setting of Preeclampsia with Severe Features , now on 24 hours of postpartum magnesium    Preeclampsia with Severe Features  Dx by SRBPs that required IV treatment   IV Antihypertensives req: L20/40 at OSH  PEC labs neg x2; P:C 2.23  Current Antihypertensive regimen: Nifedipine 60 daily  BPc: normotensive  Continue IV Mg tx (see below)  Patiently previously on O2, now on RA, asymptomatic, s/p CXR neg    IV Magnesium Treatment  Mg 6>2  No si or sx of mag toxicity, will continue to monitor   To continue for 24 hours postpartum until 1830 ()    Heme  Admit Hgb 11.3 > > EBL 1400 > 9.3 (post-op)   Follow-up POD 1 CBC    Routine post-op care  Pain well controlled, bleeding mild  PPFP: Declines    Qing Toribio MD  OBGYN Attending Physician    Subjective    Patient denies HA, vision changes, RUQ pain. Denies chest pain, SOB.     Objective    Visit Vitals  /73   Pulse 65   Temp 37 °C (98.6 °F) (Temporal)   Resp 18   LMP  (LMP Unknown)   SpO2 96%   Breastfeeding Unknown   OB Status Recent pregnancy   Smoking Status Former        Physical Examination  Lungs: clear to auscultation bilaterally, no wheezes, rhonchi, crackles   Heart: regular rate and rhythm, normal S1 and S2   Abd: soft. fundus firm below umbilicus   Ext: no edema or pain  Neuro: DTR 2+ bilaterally in UE    Lab Review  Results from last 7 days   Lab Units 24  2240 24  0518 24  0023 24  1609   WBC AUTO x10*3/uL 14.5* 10.9 10.9 8.3   HEMOGLOBIN g/dL 9.3* 11.3* 11.6* 11.1*   HEMATOCRIT % 28.7* 34.5* 35.0* 33.7*   PLATELETS AUTO x10*3/uL 137* 153 154 150   AST U/L  --  43* 48* 45*   ALT U/L  --  15 14 13   CREATININE mg/dL  --  0.75 0.68 0.65       I/O this shift:  In: 211.1 [I.V.:211.1]  Out: 225 [Urine:225]     Intake/Output Summary (Last 24 hours) at 2024  0826  Last data filed at 9/25/2024 0735  Gross per 24 hour   Intake 4654.1 ml   Output 2948 ml   Net 1706.1 ml

## 2024-09-25 NOTE — SIGNIFICANT EVENT
Patient complained of visual blurriness. Vitals stable, otherwise asymptomatic. UOP normal, DTR normal.   Mag turned off and Mag level sent.    D/w Dr. Malu Blancas MD PGY2

## 2024-09-26 LAB
ABO GROUP (TYPE) IN BLOOD: NORMAL
ALBUMIN SERPL BCP-MCNC: 2.9 G/DL (ref 3.4–5)
ALP SERPL-CCNC: 136 U/L (ref 33–110)
ALT SERPL W P-5'-P-CCNC: 17 U/L (ref 7–45)
ANION GAP SERPL CALC-SCNC: 13 MMOL/L (ref 10–20)
ANTIBODY SCREEN: NORMAL
AST SERPL W P-5'-P-CCNC: 34 U/L (ref 9–39)
BILIRUB SERPL-MCNC: 0.2 MG/DL (ref 0–1.2)
BUN SERPL-MCNC: 16 MG/DL (ref 6–23)
CALCIUM SERPL-MCNC: 7 MG/DL (ref 8.6–10.6)
CHLORIDE SERPL-SCNC: 107 MMOL/L (ref 98–107)
CO2 SERPL-SCNC: 24 MMOL/L (ref 21–32)
CREAT SERPL-MCNC: 0.84 MG/DL (ref 0.5–1.05)
EGFRCR SERPLBLD CKD-EPI 2021: >90 ML/MIN/1.73M*2
ERYTHROCYTE [DISTWIDTH] IN BLOOD BY AUTOMATED COUNT: 15.5 % (ref 11.5–14.5)
GLUCOSE SERPL-MCNC: 86 MG/DL (ref 74–99)
HCT VFR BLD AUTO: 24.6 % (ref 36–46)
HGB BLD-MCNC: 7.9 G/DL (ref 12–16)
MCH RBC QN AUTO: 31.3 PG (ref 26–34)
MCHC RBC AUTO-ENTMCNC: 32.1 G/DL (ref 32–36)
MCV RBC AUTO: 98 FL (ref 80–100)
NRBC BLD-RTO: 0 /100 WBCS (ref 0–0)
PLATELET # BLD AUTO: 156 X10*3/UL (ref 150–450)
POTASSIUM SERPL-SCNC: 4.6 MMOL/L (ref 3.5–5.3)
PROT SERPL-MCNC: 5 G/DL (ref 6.4–8.2)
RBC # BLD AUTO: 2.52 X10*6/UL (ref 4–5.2)
RH FACTOR (ANTIGEN D): NORMAL
SODIUM SERPL-SCNC: 139 MMOL/L (ref 136–145)
WBC # BLD AUTO: 11.2 X10*3/UL (ref 4.4–11.3)

## 2024-09-26 PROCEDURE — 36415 COLL VENOUS BLD VENIPUNCTURE: CPT | Performed by: STUDENT IN AN ORGANIZED HEALTH CARE EDUCATION/TRAINING PROGRAM

## 2024-09-26 PROCEDURE — 2500000001 HC RX 250 WO HCPCS SELF ADMINISTERED DRUGS (ALT 637 FOR MEDICARE OP): Performed by: STUDENT IN AN ORGANIZED HEALTH CARE EDUCATION/TRAINING PROGRAM

## 2024-09-26 PROCEDURE — 2500000004 HC RX 250 GENERAL PHARMACY W/ HCPCS (ALT 636 FOR OP/ED): Performed by: STUDENT IN AN ORGANIZED HEALTH CARE EDUCATION/TRAINING PROGRAM

## 2024-09-26 PROCEDURE — 86901 BLOOD TYPING SEROLOGIC RH(D): CPT | Performed by: STUDENT IN AN ORGANIZED HEALTH CARE EDUCATION/TRAINING PROGRAM

## 2024-09-26 PROCEDURE — 80053 COMPREHEN METABOLIC PANEL: CPT | Performed by: STUDENT IN AN ORGANIZED HEALTH CARE EDUCATION/TRAINING PROGRAM

## 2024-09-26 PROCEDURE — 2500000002 HC RX 250 W HCPCS SELF ADMINISTERED DRUGS (ALT 637 FOR MEDICARE OP, ALT 636 FOR OP/ED): Performed by: STUDENT IN AN ORGANIZED HEALTH CARE EDUCATION/TRAINING PROGRAM

## 2024-09-26 PROCEDURE — 1210000001 HC SEMI-PRIVATE ROOM DAILY

## 2024-09-26 PROCEDURE — 85027 COMPLETE CBC AUTOMATED: CPT | Performed by: STUDENT IN AN ORGANIZED HEALTH CARE EDUCATION/TRAINING PROGRAM

## 2024-09-26 ASSESSMENT — PAIN SCALES - GENERAL
PAINLEVEL_OUTOF10: 2
PAINLEVEL_OUTOF10: 0 - NO PAIN
PAINLEVEL_OUTOF10: 0 - NO PAIN
PAIN_LEVEL: 1
PAINLEVEL_OUTOF10: 0 - NO PAIN

## 2024-09-26 NOTE — LACTATION NOTE
Lactation Consultant Note  Lactation Consultation  Reason for Consult: Follow-up assessment  Consultant Name: Nancy Garcia RN, IBCLC    Maternal Information  Has mother  before?: No  Infant to breast within first 2 hours of birth?: No  Breastfeeding Delayed Due to: Infant status    Maternal Assessment  Breast Assessment: Medium, Soft, Warm, Compressible (expressible bilaterally)  Nipple Assessment: Intact, Short (somewhat flat with compression)  Areola Assessment: Normal    Infant Assessment  Infant Behavior: Feeding cues observed  Infant Assessment: Good cupping of tongue, Tongue protrudes over alveolar ridge    Feeding Assessment  Nutrition Source: Breastmilk, Donor human milk  Feeding Method: Nursing at the breast, Paced bottle, Supplemental nursing system  Feeding Position: Cross - cradle, Skin to skin, Football/seated, Mother needs assistance with latch/positioning  Suck/Feeding: Unsustained, Sustained, Baby led rhythmically, Audible swallowing with stimulaton (unsustained without use of nipple shield and supplemental nursing system)  Latch Assessment: Shallow latch, Deep latch obtained, Bursts of sucking, swallowing, and rest, Sucks with long jaw movement, Flanged lips    LATCH TOOL  Latch: Grasps breast, tongue down, lips flanged, rhythmic sucking  Audible Swallowing: Spontaneous and intermittent (24 hours old)  Type of Nipple: Flat  Comfort (Breast/Nipple): Soft/non-tender  Hold (Positioning): Minimal assist, teach one side, mother does other, staff holds  LATCH Score: 8    Breast Pump  Pump: Hospital grade electric pump  Frequency: 8-10 times per day (encouraged/educated)  Duration: Initiate phase  Breast Shield Size and Type: 21 mm    Other OB Lactation Tools  Lactation Tools: Flanges, Nipple shields    Patient Follow-up  Inpatient Lactation Follow-up Needed : Yes  Outpatient Lactation Follow-up: Recommended    Other OB Lactation Documentation  Maternal Risk Factors: Age over 30,  primiparity,  delivery, Preeclampsia    Recommendations/Summary  Per mother and father, infant has been struggling to stay latched at breast. For the first day of life infant was in NICU receiving donor breast milk via bottle. Parents express concern that now infant not interested in latched r/t receiving donor breast milk via bottle. Discussed difference in volume as well as consistency of colostrum vs. Donor breast milk. Also upon assessment of mother, mother has shorter nipples that almost flatted with compression. Discussed that mother's anatomy mixed with flow difference could be related to reason infant not sustaining latch. Infant showing feeding cues, offered assistance with latch/feed. Parents agreeable.    Infant first placed in cross-cradle hold at right breast with pillow support. Infant trying to latch to right breast but unable to grasp to sustain latch. Infant became increasingly almost frustrated. Suggested moving to football hold to see if in this position would help achieve a deeper latch. Infant still was unable to sustain latch in football hold at right breast with pillow support. Suggested nipple shield use, mother agreeable states someone already brought a nipple shield to her earlier. Parents say infant latches easier to breast with shield. Small nipple shield placed on right breast/nipple. Infant readily latched to right breast but unable to stimulate to suck/swallow. Infant fell asleep. Discussed option for supplemental nursing system to provide flow similar to how it comes out of a bottle to use with nipple shield to see if infant could sustain latch. Parents agreeable.     Supplemental nursing system set up with donor breast milk with nipple shield at right breast with infant in football hold with pillow support. Infant able to latch to right breast/nipple shield and began to have rhythmic sucks with long jaw movements and audible swallowing/gulping. After about 10-15 minutes,  infant fell asleep and unlatched, ended up taking 5 ml donor breast milk. Suggested to mother to attempt to burp and then feed again at breast if still showing feeding cues. Infant did not burp and fell asleep. Infant placed back in bassinet and encouraged mother to pump then (after each breast feeding session). Educated mother on use of pump including initiate program, goal of pumping 8-10 times in a 24 hour period, how to assess for proper flange size, proper cleaning of pump parts as well as milk storage guidelines.     Mother has a breast pump for home use. Outpatient lactation resources discussed with mother. I encouraged mother to call for any questions, concerns or assistance.

## 2024-09-26 NOTE — CARE PLAN
Problem: Postpartum  Goal: Experiences normal postpartum course  Outcome: Progressing  Goal: Appropriate maternal -  bonding  Outcome: Progressing  Goal: Incisions, wounds, or drain sites healing without S/S of infection  Outcome: Progressing     Problem:  Recovery Care  Goal: Manages discomfort  Outcome: Progressing  Goal: Patient vital signs are stable  Outcome: Progressing  Goal: Fundus firm at midline  Outcome: Progressing     Problem: Hypertensive Disorder of Pregnancy (HDP)  Goal: Minimal s/sx of HDP and BP<160/110  Outcome: Progressing

## 2024-09-26 NOTE — PROGRESS NOTES
Social Work Assessment     Patient: Archana Mullen, 35yo,   Address: [address]  Phone: [phone]    Referral Reason: depression and anxiety per chart, on Lexapro    Prenatal Care: [facility]H x 17  Barriers: None     Name: Jessi Mullen, MR# [id number]   : 23    Other Children: none    FOB: , Ryan Mullen    Supports: Ms Mullen reports her  and mother are her supports.     IPV/DV or Safety Concerns: Per chart review, no concerns for IPV/safety.     Car-Seat: yes  Safe Sleep Space: yes  Safe Sleep Education: reviewed    School/Work/Income:  reports he has 2 weeks paternity leave (paid). Ms Mullen reports her mother is available for support when  returns to work, denies concerns. No financial/food resources needs at this time.     Insurance: [facility]    Substance Use History: none    Mental Health Diagnoses/Concerns: Ms Mullen with a history of anxiety, depression, conversion disorder, and ADHD per chart. She is connected to services and on Lexapro. Ms Mullen reports her mood is stable at this time, denies concerns. SW reviewed postpartum depression signs, symptoms, and resources and Ms Mullen indicated understanding.    Bonding: No bonding concerns noted.     Assessment: SW met with Ms Mullen for assessment. She was accepting. She reports she has needed items for  and good support. She also denies depression/mental health concerns. PPD reviewed. Ms Mullen with question about breastfeeding and swelling. SW made referrals to the medical team and bedside RN aware. No additional needs noted.     Plan: Ms Mullen and  clear from SW perspective.    Signature: AURORA Boss

## 2024-09-26 NOTE — PROGRESS NOTES
Postpartum Progress Note    Assessment/Plan   Archana Mullen is a 34 y.o., , who was admitted for IOL in s/o SPEC, delivered at 39w5d gestation and is now postpartum day 2 s/p PCS at maternal request for protracted labor.     Routine postpartum care  - meeting all postpartum and post-op milestones  - voiding spontaneously, passing flatus, ambulating, tolerating PO diet  - bonding with female infant  - pain well controlled on po medications  - DVT Score: 7 - encourage ambulation,  SCDs, and  ppx lovenox  - O+, Rhogam not indicated  - PPBC: declines     SPEC  - diagnosed by multiple severe range BPs requiring IV treatment  - s/p acute treatment with labetalol 20/40 at OSH  - asymptomatic   - HELLP labs significant for mildly elevated AST, now resolved o/w negative , and P:C 2.23  - Nifedipine ER 60 mg  - BP normotensive   - s/p Mg gtt for ~22.5 hours (dced early d/t Mg toxicity)   - patient previously on O2, now on RA, asymptomatic, s/p neg CXR  - reviewed 3 day stay for BP monitoring, pt verbalized understanding and is agreeable to plan   - BP cuff for home     Acute Blood Loss Anemia  - EBL 1400  - admission hgb 11.3 -> POD#1 8.4 -> POD#2 7.9  - offered IV Fe, pt requests PO Fe (has rx already at home)    GDMA1  - for 2 hr OGTT at 4-6 week postpartum visit    Maternal Co-morbidities   -Chron's disease, on Entyvio 300mg infusion, last infusion , plan for next infusion PP  -anxiety/depression, on lexapro  -conversion d/o with sz or convulsions, shaking when very stressed, conscious but doesn't talk during episodes  -IVF pregnancy     Maternal Well-Being  - emotional support provided     Feeding  - breastfeeding/pumping encouraged; lactation consult prn    Dispo:  anticipate d/c on POD #3 if BP well-controlled and meeting all postpartum milestones, for f/u 1 week for BP check, 2 weeks for incision check, and 1 month with Primary OB provider    FREDY Swift    Principal Problem:     Severe pre-eclampsia complicating childbirth ([facility])    Pregnancy #2 Problems (from 24 to present)       Problem Noted Resolved    Severe pre-eclampsia complicating childbirth ([facility]) 2024 by Raina Donnelly MD No    Priority:  Medium      Encounter for induction of labor ([facility]) 2024 by STEWART Quiroz-CNM No    Priority:  Medium      Pregnancy resulting from assisted reproductive technology, antepartum ([facility]) 2024 by Alberto Walter MD No    Priority:  Medium      Overview Addendum 2024  5:20 PM by STEWART Wilson-CNP     US at 30 and 36w  Weekly NSTs at 34-35 wks   1 embryo in cryo, completed IVF at    Fetal echo discussed  and recommended especially in the setting of incomplete heart views (pt counseled while in ultrasound per Dr. Colby) --> completed  and WNL         Advanced maternal age, primigravida, antepartum ([facility]) 2024 by Alberto Walter MD No    Priority:  Medium      Overview Signed 2024 12:50 PM by Mary Anne Diop, STEWART-CNP     -rr cfDNA   -Serial growth   -Weekly  testing at 34 wks if not initiated sooner          Gestational diabetes mellitus (GDM), antepartum ([facility]) 2024 by Alberto Walter MD No    Priority:  Medium      Overview Addendum 2024 10:38 AM by Beata Snyder RN     -Shared Care with   - Attended Boot Camp   - MFM Consult completed   -MFM 36 wk visit pending   -Serial growth ultrasounds starting at 28 weeks    Last ultrasound:  : EFW 23%, AC 29%, see report for full details   : EFW 24%, AC 10%, 2VC, incomplete heart views (counseled in US per Dr. Colby re: new ultrasound findings    Fetal surveillance:   -Weekly at 34 wks     Current Regimen: nutrition-> BG log reviewed and more than 80% within goal range  24 nutrition   2024 Nutrition plan    2024 Nutrition plan  24 Nutrition plan  2024 :   9/3/2024 Nutrition plan    9/10/2024 Nutrition  plan    9/19/2024 : nutrition             Delivery Plan: 39 wks pending continued BG control on nutrition plan  Intrapartum:  GDM protocol  Postpartum: No medication    Recommend PP 2hr gtt and q3yr F/U with PCP for A1C and TSH      IOL set for 39.4 weeks, per pt request         Maternal Crohn's disease affecting pregnancy, antepartum ([facility]) 8/23/2023 by Carolin Ledezma CMA No    Priority:  Medium      Overview Addendum 7/30/2024 12:41 PM by KATHERINE Wilson     Sees Dr. Angel Kirkland, GI  Entyvio 300 mg infusions every 8 weeks up until 34 weeks pregnant and then would hold infusions until after delivery   Last infusion 7/16--> plan for next infusion to be PP          Decreased fetal movements in third trimester ([facility]) 8/14/2024 by Shaka Richards MD 9/23/2024 by MAURO Quiroz    Suspected fetal abnormality affecting management of mother ([facility]) 8/9/2024 by Clarita Taylor MD 9/23/2024 by MAURO Quiroz    Maternal hypotension syndrome in third trimester ([facility]) 7/16/2024 by Bina Vazquez MD 9/9/2024 by Yariel Waddell MD    34 weeks gestation of pregnancy ([facility]) 7/16/2024 by Bina Vazquez MD 9/23/2024 by MAURO Quiroz    Overview Signed 7/30/2024 12:50 PM by KATHERINE Wilson     -Primary OB: Dr. Waddell          12 weeks gestation of pregnancy ([facility]) 3/11/2024 by Aftab Swann, DO 4/11/2024 by Andreea John MA          Subjective   Her pain is well controlled with current medications  She is passing flatus  She is ambulating well  She is tolerating a Adult diet Regular  She reports no breast or nursing problems  She denies emotional concerns today      Denies HA, vision changes, RUQ pain, chest pain, or SOB.    Objective   Allergies:   Wellbutrin [bupropion hcl], Bupropion, and Penicillin v         Last Vitals:  Temp Pulse Resp BP MAP Pulse Ox   36.9 °C (98.4 °F) 98 18 119/73   97 %     Vitals Min/Max Last 24 Hours:  Temp   Min: 36.1 °C (97 °F)  Max: 36.9 °C (98.4 °F)  Pulse  Min: 65  Max: 98  Resp  Min: 16  Max: 20  BP  Min: 103/67  Max: 120/75    Intake/Output:     Intake/Output Summary (Last 24 hours) at 9/26/2024 1551  Last data filed at 9/25/2024 2100  Gross per 24 hour   Intake --   Output 400 ml   Net -400 ml       Physical Exam:  General: well appearing, well-nourished, postpartum  Obstetric: abdomen soft/non-tender, fundus firm below umbilicus, lochia light, Pfannenstiel incision c/d/i  Skin: No rashes/lesions/erythema  Neuro: A/Ox3, conversational  GI: +BS, +flatus  Respiratory: Even and unlabored on RA, LSCTA BL  Cardiovascular: RRR, normal S1, S2  Extremities: mild and equal BLE edema, no discoloration, or pain in BLE, equal and palpable DP and PT pulses    Psych: appropriate mood and affect     Lab Data:  Lab Results   Component Value Date    WBC 11.2 09/26/2024    HGB 7.9 (L) 09/26/2024    HCT 24.6 (L) 09/26/2024     09/26/2024     Lab Results   Component Value Date    GLUCOSE 86 09/26/2024     09/26/2024    K 4.6 09/26/2024     09/26/2024    CO2 24 09/26/2024    ANIONGAP 13 09/26/2024    BUN 16 09/26/2024    CREATININE 0.84 09/26/2024    EGFR >90 09/26/2024    CALCIUM 7.0 (L) 09/26/2024    ALBUMIN 2.9 (L) 09/26/2024    PROT 5.0 (L) 09/26/2024    ALKPHOS 136 (H) 09/26/2024    ALT 17 09/26/2024    AST 34 09/26/2024    BILITOT 0.2 09/26/2024

## 2024-09-26 NOTE — ANESTHESIA POSTPROCEDURE EVALUATION
Patient: Archana Mullen    Procedure Summary       Date: 24 Room / Location: Virtual [facility] 2 OB    Anesthesia Start: 347 Anesthesia Stop:     Procedure: OBGYN Delivery  Section Diagnosis: (Failure to Progress)    Surgeons: Jannette Noble MD Responsible Provider: Alley Dillon MD    Anesthesia Type: epidural ASA Status: 3            Anesthesia Type: epidural    Vitals Value Taken Time   BP 99/58 24   Temp 36 °C (96.8 °F) 24   Pulse 92 24   Resp 18 24   SpO2 96 % 24       Anesthesia Post Evaluation    Patient location during evaluation: floor  Patient participation: complete - patient participated  Level of consciousness: awake  Pain score: 1  Pain management: adequate  Airway patency: patent  Cardiovascular status: acceptable  Respiratory status: acceptable  Hydration status: acceptable  Postoperative Nausea and Vomiting: none  Comments: Epidural sight accessed. No drainage, redness or swelling. Patient ambulating without issue. No N/V, able to void. No signs of PDPH. Taking oral intake appropriately. Pain well controlled.    No notable events documented.

## 2024-09-27 VITALS
TEMPERATURE: 98.6 F | OXYGEN SATURATION: 96 % | RESPIRATION RATE: 22 BRPM | SYSTOLIC BLOOD PRESSURE: 132 MMHG | DIASTOLIC BLOOD PRESSURE: 88 MMHG | HEART RATE: 120 BPM

## 2024-09-27 PROCEDURE — 2500000001 HC RX 250 WO HCPCS SELF ADMINISTERED DRUGS (ALT 637 FOR MEDICARE OP)

## 2024-09-27 PROCEDURE — 2500000001 HC RX 250 WO HCPCS SELF ADMINISTERED DRUGS (ALT 637 FOR MEDICARE OP): Performed by: STUDENT IN AN ORGANIZED HEALTH CARE EDUCATION/TRAINING PROGRAM

## 2024-09-27 PROCEDURE — 99238 HOSP IP/OBS DSCHRG MGMT 30/<: CPT

## 2024-09-27 PROCEDURE — 2500000002 HC RX 250 W HCPCS SELF ADMINISTERED DRUGS (ALT 637 FOR MEDICARE OP, ALT 636 FOR OP/ED): Performed by: STUDENT IN AN ORGANIZED HEALTH CARE EDUCATION/TRAINING PROGRAM

## 2024-09-27 PROCEDURE — 2500000004 HC RX 250 GENERAL PHARMACY W/ HCPCS (ALT 636 FOR OP/ED)

## 2024-09-27 RX ORDER — NIFEDIPINE 60 MG/1
60 TABLET, FILM COATED, EXTENDED RELEASE ORAL DAILY
Qty: 30 TABLET | Refills: 0 | Status: SHIPPED | OUTPATIENT
Start: 2024-09-27 | End: 2024-10-27

## 2024-09-27 RX ORDER — CETIRIZINE HYDROCHLORIDE 10 MG/1
10 TABLET ORAL DAILY
Status: DISCONTINUED | OUTPATIENT
Start: 2024-09-27 | End: 2024-09-27 | Stop reason: HOSPADM

## 2024-09-27 RX ORDER — DIPHENHYDRAMINE HYDROCHLORIDE 50 MG/ML
25 INJECTION INTRAMUSCULAR; INTRAVENOUS ONCE
Status: COMPLETED | OUTPATIENT
Start: 2024-09-27 | End: 2024-09-27

## 2024-09-27 RX ORDER — CETIRIZINE HYDROCHLORIDE 10 MG/1
10 TABLET ORAL DAILY
Qty: 30 TABLET | Refills: 0 | Status: SHIPPED | OUTPATIENT
Start: 2024-09-28

## 2024-09-27 ASSESSMENT — PAIN SCALES - GENERAL: PAINLEVEL_OUTOF10: 0 - NO PAIN

## 2024-09-27 NOTE — LACTATION NOTE
Lactation Consultant Note    Recommendations/Summary  Came in to assist mom and baby with a feed. Baby was beginning to root around in the bassinet and was sucking on her fingers. Her suck on my finger was intermittent and slightly uncoordinated. Baby has had difficulty sucking consistently at the breast--she will typically latch and then not suck. Mom has short nipples and she has been using a nipple shield. Mom states that they tried the SNS once yesterday with lactation and this was successful. Overnight, mom took a break from latching and pumped and gave bottles. She is now producing 15-20 mls per session which is excellent. Parents have also been supplementing with donor breast milk when needed.    I offered my assistance with latching and mom agreed. We positioned baby in football hold with good body alignment at the right breast. We applied the nipple shield. Parents felt overwhelmed using the SNS and were looking for another method to try at home. I suggested using a syringe at the breast to encourage baby to continue sucking and parents agreed to try this method. I dripped a few drops of donor milk into baby's mouth and put some on the tip of the nipple shield. Baby opened wide enough to latch, and sucked with long jaw movement, but then released the breast after a few sucks. Baby has a very stuffy nose, which seems to be interfering with her ability to latch. Parents plan to talk to peds about trying some saline to help with congestion. Baby was able to successfully relatch and suck rhythmically. Several swallows noted and colostrum was present in the shield when baby unlatched. When baby slowed her sucks at the breast, I showed parents how to drop some drops of colostrum into the corner of baby's mouth via syringe. This helped baby continue to suck. Baby did latch a few times throughout the feed (possibly due to congestion) but did re-latch easily. Baby fed at the breast for at least 15 mins. After the  feed, baby appeared satiated. Baby took 5 mls of donor milk during this feed and I instructed parents to try to feed 15 mls of donor milk after the breastfeed via paced bottle feeding. Parents feel much more confident using this method rather than the SNS at home. I provided parents with syringes for home and instructed them on their use and cleaning.    Feeding plan for home is to continue to attempt to latch baby every 2-3 hours with the nipple shield and syringes at the breast of pumped milk or ready to feed formula. Parents will use breast compression and infant stimulation techniques to help keep baby awake and actively feeding. After breastfeeding, mom will pump both breasts for 15 mins. Parents will follow up breastfeeds with up to 20 mls of pumped milk or ready to feed formula. Parents feel comfortable with this plan and have no further questions at this time. Mom has a pump for at home. We reviewed the outpatient lactation information.

## 2024-09-27 NOTE — CARE PLAN
The patient's goals for the shift include pt will get some rest    The clinical goals for the shift include healthy mom and healthy baby    Problem: Postpartum  Goal: Experiences normal postpartum course  Outcome: Met     Problem: Safety - Adult  Goal: Free from fall injury  Outcome: Met

## 2024-09-27 NOTE — DISCHARGE SUMMARY
Discharge Summary    Admission Date: 2024  Discharge Date: 24     Discharge Diagnosis  Severe pre-eclampsia complicating childbirth ([facility])    Hospital Course  Delivery Date: 2024 6:30 PM  Delivery type: , Low Transverse   GA at delivery: 39w5d  Outcome: Living  Anesthesia during delivery: Combined spinal-epidural  Intrapartum complications: Failure to Progress in First Stage  Feeding method: Breastfeeding Status: Yes     Procedures: none  Contraception at discharge: none. We discussed pregnancy spacing of at least one year, abstaining from intercourse for 6wks, and the ability to become pregnant in the absence of regular menses. Pt verbalized understanding.     Archana Mullen is a 34 y.o., , who was admitted for IOL in s/o SPEC, delivered at 39w5d gestation and is now postpartum day 3 s/p PCS at maternal request for protracted labor.     SPEC  - diagnosed by multiple severe range BPs requiring IV treatment  - s/p acute treatment with labetalol 20/40 at OSH  - asymptomatic   - HELLP labs significant for mildly elevated AST, now resolved o/w negative , and P:C 2.23  - Nifedipine ER 60 mg  - BP normotensive   - s/p Mg gtt for ~22.5 hours (dced early d/t Mg toxicity)   - patient previously on O2, now on RA, asymptomatic, s/p neg CXR  - BP cuff for home      Acute Blood Loss Anemia  - EBL 1400  - admission hgb 11.3 -> POD#1 8.4 -> POD#2 7.9  - offered IV Fe, pt requests PO Fe (has rx already at home)     GDMA1  - for 2 hr OGTT at 4-6 week postpartum visit    Chronic urticaria- start zyrtec daily, solumedrol x1 overnight    Tachycardia- EKG shows sinus tachycardia, 120s. Patient asymptomatic. Patient s/p 2 [employer] drinks. She hasn't had caffeine in pregnancy. Reports not drinking enough water. Encouraged increasing PO fluids and limiting caffeine intake.    Anx/dep: continue lexparo, mood stable    Meeting all postpartum milestones. OK for DC today and follow up as below.   Follow up  with your OB provider in 2-5 days for BP check, in 2 weeks for incision check, and in 4-6 weeks for postpartum visit     Pertinent Physical Exam At Time of Discharge    General: Examination reveals a well developed, well nourished, female, in no acute distress. She is alert and cooperative.  Lungs: symmetrical, non-labored breathing.  Cardiac: warm, well-perfused.  Abdomen: soft, non-tender.  Incision: Well approximated, without erythema/edema/drainage  Fundus: firm, below umbilicus, appropriately tender  Extremities: no redness or tenderness in the calves or thighs.  Neurological: alert, oriented, normal speech, no focal findings or movement disorder noted.     Last Vitals:  Temp Pulse Resp BP MAP Pulse Ox   37 °C (98.6 °F) (!) 120 (LPN notified) 22 (LPN notified) 132/88 (LPN notified) 95 96 %     Discharge Meds     Your medication list        START taking these medications        Instructions Last Dose Given Next Dose Due   cetirizine 10 mg tablet  Commonly known as: ZyrTEC  Start taking on: September 28, 2024      Take 1 tablet (10 mg) by mouth once daily.       NIFEdipine ER 60 mg 24 hr tablet  Commonly known as: Adalat CC      Take 1 tablet (60 mg) by mouth once daily. Do not crush, chew, or split.              CONTINUE taking these medications        Instructions Last Dose Given Next Dose Due   Autolet lancing device      Test 4 times a day: fasting and 1 hour after breakfast/lunch/dinner       blood-glucose meter misc      1 kit by soft tissue injection route 4 times a day. Test and record values 4 times a day: fasting (upon waking) and 1 hour after breakfast/lunch/dinner (1 hour from first bite if meal finished within 30 minutes)       Entyvio 300 mg injection  Generic drug: vedolizumab           escitalopram 10 mg tablet  Commonly known as: Lexapro      Take 1 tablet (10 mg) by mouth once daily.       FreeStyle Taylor 3 Sensor device  Generic drug: blood-glucose sensor      28-day supply of FreeStyle Taylor 3  sensors (2 sensors); use as directed       isopropyl alcohoL 70 % towelette      Test 4 times a day: fasting and 1 hour after breakfast/lunch/dinner       PRENATAL 19 ORAL                  STOP taking these medications      blood sugar diagnostic strip               ASK your doctor about these medications        Instructions Last Dose Given Next Dose Due   meclizine 25 mg tablet  Commonly known as: Antivert      Take 1 tablet (25 mg) by mouth 3 times a day as needed for dizziness.                 Where to Get Your Medications        These medications were sent to [id number] - [place], [place] - [address]  [address], [place] [address]      Phone: [phone]   cetirizine 10 mg tablet  NIFEdipine ER 60 mg 24 hr tablet          Complications Requiring Follow-Up  GDMA, sPEC    Test Results Pending At Discharge  Pending Labs       Order Current Status    Surgical Pathology Exam - [facility] In process            Outpatient Follow-Up  Future Appointments   Date Time Provider Department [place]   10/11/2024 12:45 PM Yariel Waddell MD [id number] [facility]   12/2/2024 10:00 AM Angel Kirkland MD [id number] [facility] spent 20 minutes in the professional and overall care of this patient.      Lay Woodall, APRN-CNP

## 2024-09-27 NOTE — SIGNIFICANT EVENT
"Called to room by nurse. Patient feeling flushed and hot with chills. Has hives on arms, hands, abdomen, feet and legs.  Of note, patient has a history of chronic urticaria. Notes hives have been there for a few days, but not improving with home benadryl and topical steroid cream as they usually do.Feels the trigger may be the \"poking\" around her arms. She notes this feels similar to prior episodes of her urticaria. Reports in the past has required steroids for episodes like this. Denies any new medications or foods. Denies SOB, chest pain.     Vitals:    09/26/24 2345   BP: 113/73   Pulse: (!) 115   Resp: (!) 24   Temp: 37.2 °C (99 °F)   SpO2: 96%     General: NAD  Psych: appropriate mood and affect  Lungs: clear to auscultation bilaterally  Heart: regular rhythm, tachycardic  Abdomen: Soft, non-tender, non-distended, incision clean dry and intact without erythema   Extremities: warm and well-perfused, no swelling or tenderness  Skin: urticaria present on bilateral upper and lower extremities and abdomen       Plan  -On assessment no increased work of breathing, exam unremarkable and hemodynamically stable, likely flare of chronic urticaria   -Per chart review, patient previously completed 1wk taper. Plan for IV steroid dose of 135mg solu-medrol   -s/p PO benadryl 25mg, plan for IV benadryl 25mg  -continue to monitor    Discussed with Dr. Mcdonald and Dr. Andrzej Crump MD  PGY1, Obstetrics and Gynecology    "

## 2024-09-27 NOTE — CARE PLAN
Problem: Vaginal Birth or  Section  Goal: Demonstrates labor coping techniques through delivery  Outcome: Progressing  Goal: Minimal s/sx of HDP and BP<160/110  Outcome: Progressing  Goal: No s/sx of infection through recovery  Outcome: Progressing  Goal: No s/sx of hemorrhage through recovery  Outcome: Progressing     Problem: Pain - Adult  Goal: Verbalizes/displays adequate comfort level or baseline comfort level  Outcome: Progressing     Problem:  Recovery Care  Goal: Manages discomfort  Outcome: Progressing  Goal: Patient vital signs are stable  Outcome: Progressing  Goal: Fundus firm at midline  Outcome: Progressing

## 2024-09-28 LAB
BLOOD EXPIRATION DATE: NORMAL
DISPENSE STATUS: NORMAL
PRODUCT BLOOD TYPE: 5100
PRODUCT CODE: NORMAL
UNIT ABO: NORMAL
UNIT NUMBER: NORMAL
UNIT RH: NORMAL
UNIT VOLUME: 278
XM INTEP: NORMAL

## 2024-09-30 ENCOUNTER — PATIENT OUTREACH (OUTPATIENT)
Dept: CARE COORDINATION | Facility: CLINIC | Age: 34
End: 2024-09-30
Payer: COMMERCIAL

## 2024-09-30 NOTE — PROGRESS NOTES
Outreach call to patient to support a smooth transition of care from recent admission.  Left voicemail message for patient with my contact information.  Jania SHERMAN, RN, [facility]  O: [phone]

## 2024-10-01 PROBLEM — Z34.90 ENCOUNTER FOR INDUCTION OF LABOR: Status: RESOLVED | Noted: 2024-09-23 | Resolved: 2024-10-01

## 2024-10-01 PROBLEM — O09.519 ADVANCED MATERNAL AGE, PRIMIGRAVIDA, ANTEPARTUM (HHS-HCC): Status: RESOLVED | Noted: 2024-07-14 | Resolved: 2024-10-01

## 2024-10-01 PROBLEM — Z36.89 NST (NON-STRESS TEST) REACTIVE (HHS-HCC): Status: RESOLVED | Noted: 2024-08-14 | Resolved: 2024-10-01

## 2024-10-01 LAB
LABORATORY COMMENT REPORT: NORMAL
PATH REPORT.FINAL DX SPEC: NORMAL
PATH REPORT.GROSS SPEC: NORMAL
PATH REPORT.RELEVANT HX SPEC: NORMAL
PATH REPORT.TOTAL CANCER: NORMAL

## 2024-10-02 ENCOUNTER — APPOINTMENT (OUTPATIENT)
Dept: OBSTETRICS AND GYNECOLOGY | Facility: CLINIC | Age: 34
End: 2024-10-02
Payer: COMMERCIAL

## 2024-10-02 VITALS
BODY MASS INDEX: 34.67 KG/M2 | WEIGHT: 202 LBS | TEMPERATURE: 98.1 F | DIASTOLIC BLOOD PRESSURE: 72 MMHG | SYSTOLIC BLOOD PRESSURE: 120 MMHG

## 2024-10-02 PROBLEM — O09.819 PREGNANCY RESULTING FROM ASSISTED REPRODUCTIVE TECHNOLOGY, ANTEPARTUM (HHS-HCC): Status: RESOLVED | Noted: 2024-07-14 | Resolved: 2024-10-02

## 2024-10-02 PROBLEM — O24.419 GESTATIONAL DIABETES MELLITUS (GDM), ANTEPARTUM (HHS-HCC): Status: RESOLVED | Noted: 2024-07-14 | Resolved: 2024-10-02

## 2024-10-02 PROCEDURE — 3074F SYST BP LT 130 MM HG: CPT | Performed by: OBSTETRICS & GYNECOLOGY

## 2024-10-02 PROCEDURE — 3078F DIAST BP <80 MM HG: CPT | Performed by: OBSTETRICS & GYNECOLOGY

## 2024-10-02 PROCEDURE — 99213 OFFICE O/P EST LOW 20 MIN: CPT | Performed by: OBSTETRICS & GYNECOLOGY

## 2024-10-02 NOTE — PROGRESS NOTES
"Chief Complaint   Patient presents with    Postpartum Follow-up     2 Week PP Incision Check/ BP Check      Delivered 24 C-Sec Girl \"Jessi\"  Delivered by Dr. Noble  Breastfeeding/Pumping Going well    Overall feeling well.  BP at home has been in 140/90 range at times, BP normotensive in office today.     in room with patient.       Patient is 1 week postop/postpartum.  Was admitted for induction at [facility] but was diagnosed as severe preeclamptic.  Had pseudoseizures which patient has had in the past and seem to amplify with stress.  They did amplify while there and patient was [place] [facility]  Placed on magnesium.  Did eventually undergo low-transverse  section after drawnout induction of labor and lack of progress.  Baby in NICU initially but now doing well.    Currently home.  Overall feels well physically, getting better every day.  Emotionally had baby blues for the first week but this has improved.  Feels well.    Conceived with IVF, no need for birth control per patient    REVIEW OF SYSTEMS    Please see HPI for reported pertinent positives, which would supersede this ROS    Constitutional:  Denies fever, chills, wt gain or loss, fatigue    Genito-Urinary:  Denies genital lesion or sores, vaginal dryness, itching  or pain.  No abnormal vaginal discharge or unexplained vaginal bleeding.  No dysuria, urinary incontinence or frequency.  Denies pelvic pain, dysmenorrhea or dyspareunia.    Eyes:  Denies vision changes, dryness  ENT:  No hearing loss, sinus pain or congestion, nosebleeds  Cardiovascular:  No chest pain or palpitations  Respiratory:  No SOB, cough, wheezing  GI:  No Nausea, vomiting, diarrhea, constipation, abdominal pain  Musculoskeletal:  No new back pain. joint pain, peripheral edema  Skin:  No rash or skin lesion  Neurologic:  No HA, numbness or dizziness  Psychiatric:  No new anxiety or depression  Endocrine:  No loss of hair or hirsutism  Hematologic/lymphatic:  " No swollen lymph nodes.  No reported tendency for easy bruising or bleeding    Patient admits to no other systemic complaints    Home blood pressures right around 140/90    Vitals:    10/02/24 1255   BP: 120/72   Temp: 36.7 °C (98.1 °F)         PHYSICAL EXAM      PSYCH:  Pt is alert, oriented and cooperative    SKIN: warm, dry, w/o lesion    HEAD AND FACE:  External inspection of eyes, ears, functional cranial nerves normal and intact    THYROID:  No thyromegaly    CARDIOVASCULAR:  Warm and well Perfused    PULMONARY:  No respiratory distress    ABDOMEN:  soft, nontender.  No mass or organomegaly.    Incision clean dry intact and healing    Assessment/Plan    Diagnoses and all orders for this visit:  Routine postpartum follow-up  Severe preeclampsia, resolving.  Maintain 60 mg nifedipine until blood pressures persistently less than 130/80 or symptomatically low.  At that point cut to 30 mg daily.

## 2024-10-03 ENCOUNTER — PATIENT OUTREACH (OUTPATIENT)
Dept: CARE COORDINATION | Facility: CLINIC | Age: 34
End: 2024-10-03
Payer: COMMERCIAL

## 2024-10-03 NOTE — PROGRESS NOTES
Outreach call to patient following up on appointment with primary care provider.  Patient with no additional questions at this time.  Will continue to follow.      Jania SHERMAN, RN, [facility]  O: [phone]

## 2024-10-11 ENCOUNTER — APPOINTMENT (OUTPATIENT)
Dept: OBSTETRICS AND GYNECOLOGY | Facility: CLINIC | Age: 34
End: 2024-10-11
Payer: COMMERCIAL

## 2024-10-21 ENCOUNTER — TELEPHONE (OUTPATIENT)
Dept: OBSTETRICS AND GYNECOLOGY | Facility: CLINIC | Age: 34
End: 2024-10-21

## 2024-10-21 ENCOUNTER — APPOINTMENT (OUTPATIENT)
Dept: OBSTETRICS AND GYNECOLOGY | Facility: CLINIC | Age: 34
End: 2024-10-21
Payer: COMMERCIAL

## 2024-10-21 NOTE — TELEPHONE ENCOUNTER
Patient has an appoint today for postpartum and wants to know if she needs to come in or can you just call her?

## 2024-10-28 ENCOUNTER — APPOINTMENT (OUTPATIENT)
Dept: PRIMARY CARE | Facility: CLINIC | Age: 34
End: 2024-10-28
Payer: COMMERCIAL

## 2024-10-30 ENCOUNTER — PATIENT OUTREACH (OUTPATIENT)
Dept: CARE COORDINATION | Facility: CLINIC | Age: 34
End: 2024-10-30
Payer: COMMERCIAL

## 2024-11-25 ENCOUNTER — APPOINTMENT (OUTPATIENT)
Dept: GASTROENTEROLOGY | Facility: CLINIC | Age: 34
End: 2024-11-25
Payer: COMMERCIAL

## 2024-12-02 ENCOUNTER — APPOINTMENT (OUTPATIENT)
Dept: GASTROENTEROLOGY | Facility: CLINIC | Age: 34
End: 2024-12-02
Payer: COMMERCIAL

## 2024-12-02 ENCOUNTER — LAB (OUTPATIENT)
Dept: LAB | Facility: LAB | Age: 34
End: 2024-12-02
Payer: COMMERCIAL

## 2024-12-02 VITALS — BODY MASS INDEX: 33.29 KG/M2 | HEIGHT: 64 IN | WEIGHT: 195 LBS

## 2024-12-02 DIAGNOSIS — Z51.81 THERAPEUTIC DRUG MONITORING: ICD-10-CM

## 2024-12-02 DIAGNOSIS — K50.00 CROHN'S DISEASE OF SMALL INTESTINE WITHOUT COMPLICATION (MULTI): ICD-10-CM

## 2024-12-02 DIAGNOSIS — K50.00 CROHN'S DISEASE OF SMALL INTESTINE WITHOUT COMPLICATION (MULTI): Primary | ICD-10-CM

## 2024-12-02 LAB
ALBUMIN SERPL BCP-MCNC: 4.5 G/DL (ref 3.4–5)
ALP SERPL-CCNC: 94 U/L (ref 33–110)
ALT SERPL W P-5'-P-CCNC: 39 U/L (ref 7–45)
ANION GAP SERPL CALC-SCNC: 12 MMOL/L (ref 10–20)
AST SERPL W P-5'-P-CCNC: 35 U/L (ref 9–39)
BASOPHILS # BLD AUTO: 0.06 X10*3/UL (ref 0–0.1)
BASOPHILS NFR BLD AUTO: 0.8 %
BILIRUB SERPL-MCNC: 0.3 MG/DL (ref 0–1.2)
BUN SERPL-MCNC: 14 MG/DL (ref 6–23)
CALCIUM SERPL-MCNC: 9.4 MG/DL (ref 8.6–10.3)
CHLORIDE SERPL-SCNC: 107 MMOL/L (ref 98–107)
CO2 SERPL-SCNC: 26 MMOL/L (ref 21–32)
CREAT SERPL-MCNC: 0.83 MG/DL (ref 0.5–1.05)
EGFRCR SERPLBLD CKD-EPI 2021: >90 ML/MIN/1.73M*2
EOSINOPHIL # BLD AUTO: 0.25 X10*3/UL (ref 0–0.7)
EOSINOPHIL NFR BLD AUTO: 3.4 %
ERYTHROCYTE [DISTWIDTH] IN BLOOD BY AUTOMATED COUNT: 12.4 % (ref 11.5–14.5)
FERRITIN SERPL-MCNC: 25 NG/ML (ref 8–150)
FOLATE SERPL-MCNC: 14.7 NG/ML
GLUCOSE SERPL-MCNC: 84 MG/DL (ref 74–99)
HCT VFR BLD AUTO: 38.1 % (ref 36–46)
HGB BLD-MCNC: 12.3 G/DL (ref 12–16)
IMM GRANULOCYTES # BLD AUTO: 0.02 X10*3/UL (ref 0–0.7)
IMM GRANULOCYTES NFR BLD AUTO: 0.3 % (ref 0–0.9)
IRON SATN MFR SERPL: 15 % (ref 25–45)
IRON SERPL-MCNC: 55 UG/DL (ref 35–150)
LYMPHOCYTES # BLD AUTO: 2.05 X10*3/UL (ref 1.2–4.8)
LYMPHOCYTES NFR BLD AUTO: 28.2 %
MCH RBC QN AUTO: 29.9 PG (ref 26–34)
MCHC RBC AUTO-ENTMCNC: 32.3 G/DL (ref 32–36)
MCV RBC AUTO: 93 FL (ref 80–100)
MONOCYTES # BLD AUTO: 0.59 X10*3/UL (ref 0.1–1)
MONOCYTES NFR BLD AUTO: 8.1 %
NEUTROPHILS # BLD AUTO: 4.31 X10*3/UL (ref 1.2–7.7)
NEUTROPHILS NFR BLD AUTO: 59.2 %
NRBC BLD-RTO: 0 /100 WBCS (ref 0–0)
PLATELET # BLD AUTO: 250 X10*3/UL (ref 150–450)
POTASSIUM SERPL-SCNC: 4.3 MMOL/L (ref 3.5–5.3)
PROT SERPL-MCNC: 6.8 G/DL (ref 6.4–8.2)
RBC # BLD AUTO: 4.12 X10*6/UL (ref 4–5.2)
SODIUM SERPL-SCNC: 141 MMOL/L (ref 136–145)
TIBC SERPL-MCNC: 376 UG/DL (ref 240–445)
UIBC SERPL-MCNC: 321 UG/DL (ref 110–370)
VIT B12 SERPL-MCNC: 374 PG/ML (ref 211–911)
WBC # BLD AUTO: 7.3 X10*3/UL (ref 4.4–11.3)

## 2024-12-02 PROCEDURE — 83540 ASSAY OF IRON: CPT

## 2024-12-02 PROCEDURE — 80053 COMPREHEN METABOLIC PANEL: CPT

## 2024-12-02 PROCEDURE — 36415 COLL VENOUS BLD VENIPUNCTURE: CPT

## 2024-12-02 PROCEDURE — 3008F BODY MASS INDEX DOCD: CPT | Performed by: INTERNAL MEDICINE

## 2024-12-02 PROCEDURE — 85025 COMPLETE CBC W/AUTO DIFF WBC: CPT

## 2024-12-02 PROCEDURE — 82746 ASSAY OF FOLIC ACID SERUM: CPT

## 2024-12-02 PROCEDURE — 82728 ASSAY OF FERRITIN: CPT

## 2024-12-02 PROCEDURE — 86481 TB AG RESPONSE T-CELL SUSP: CPT

## 2024-12-02 PROCEDURE — 83550 IRON BINDING TEST: CPT

## 2024-12-02 PROCEDURE — 82607 VITAMIN B-12: CPT

## 2024-12-02 PROCEDURE — 99214 OFFICE O/P EST MOD 30 MIN: CPT | Performed by: INTERNAL MEDICINE

## 2024-12-02 ASSESSMENT — ENCOUNTER SYMPTOMS: SHORTNESS OF BREATH: 0

## 2024-12-02 NOTE — PATIENT INSTRUCTIONS
Continue Entyvio as schedule. Update labs. If iron deficiency anemia, then we will start an iron supplement. Follow-up in the office in 6 months.

## 2024-12-02 NOTE — PROGRESS NOTES
REASON FOR VISIT:  Crohn's disease  PCP (requesting provider): Aftab Swann DO.    HPI:  Archana Mullen is a 34 y.o. female with a past medical history of ADHD, anxiety, and depression following for ileal Crohn's disease (diagnosed 2017) in deep remission on Entyvio (started 2019). Failed Mesalamine, intolerant of Budesonide, and loss of efficacy with Humira. Colonoscopy showed hemorrhoids (2021). Fecal calprotectin 78 (2022). Recent pregnancy complicated by pre-eclampsia and continued on Entyvio.    The patient notes she had  for pre-eclampsia. She had last Entyvio 9 weeks ago so only off schedule 1 week as she gets infusion later today. He wound healed well. Bowel habits are every few days. No blood in the stool. No IBD related abdominal pain. No EIMs. She is breastfeeding.    PSurgHx:  -Appendectomy      FamHx: MGF with colon cancer. Two maternal cousins with Crohn's disease.     Prior Endoscopy:  -Colonoscopy (2021): Fair prep, normal TI (normal TI biopsies), small EH, otherwise normal colon (normal right and left colonic biopsies).  -Colonoscopy (2018): Excellent prep, normal TI (normal TI biopsies), normal colon (normal segmental colonic biopsies).  -EGD (2017): Normal esophagus, mild gastritis (H. pylori -), normal duodenum (normal duodenal biopsies).  -Colonoscopy (2017): Excellent prep, localized area of moderately erythematous and ulcerated and congested mucosa (TI biopsies showed chronic active ileitis), normal colon (normal segmental colonic biopsies).     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    evaluated at     Crohn's disease of [facility]  intestine without complications (Multi) 08/18/2022    Crohn's disease of ileum without complication    Other urticaria 11/11/2021    Chronic urticaria    Papillomavirus as the cause of diseases classified elsewhere     HPV in female    Personal history of other diseases of the female genital tract     History of abnormal cervical Papanicolaou smear       PAST SURGICAL HISTORY  Past Surgical History:   Procedure Laterality Date    APPENDECTOMY  04/20/2020    COLONOSCOPY      ESOPHAGOGASTRODUODENOSCOPY      OVARIAN CYST REMOVAL Right 08/18/2009    TONSILLECTOMY         FAMILY HISTORY  Family History   Problem Relation Name Age of Onset    Stroke Maternal Grandmother      Lung cancer Maternal Grandmother      Colon cancer Maternal Grandfather      Stroke Paternal Grandmother      Inflammatory bowel disease Cousin Maternal        SOCIAL HISTORY   reports that she has quit smoking. Her smoking use included cigarettes. She has been exposed to tobacco smoke. She has never used smokeless tobacco. She reports that she does not currently use alcohol. She reports that she does not use drugs.    REVIEW OF SYSTEMS  Review of Systems   Respiratory:  Negative for shortness of breath.    Cardiovascular:  Negative for chest pain.   All other systems reviewed and are negative.    A 10+ point review of systems was otherwise negative except as noted and per HPI.    ALLERGIES  Allergies   Allergen Reactions    Wellbutrin [Bupropion Hcl] Hives    Bupropion Swelling and Rash    Penicillin V Rash     Penicillin V Potassium TABS       MEDICATIONS  Current Outpatient Medications   Medication Instructions    Autolet lancing device Test 4 times a day: fasting and 1 hour after breakfast/lunch/dinner    blood-glucose meter misc 1 kit, soft tissue injection, 4 times daily, Test and record values 4 times a day: fasting (upon waking) and 1 hour after breakfast/lunch/dinner (1 hour from first bite if meal finished within 30 minutes)    blood-glucose  sensor (FreeStyle Taylor 3 Sensor) device 28-day supply of FreeStyle Taylor 3 sensors (2 sensors); use as directed    cetirizine (ZYRTEC) 10 mg, oral, Daily    escitalopram (LEXAPRO) 10 mg, oral, Daily    isopropyl alcohoL 70 % towelette Test 4 times a day: fasting and 1 hour after breakfast/lunch/dinner    meclizine (ANTIVERT) 25 mg, oral, 3 times daily PRN    NIFEdipine ER (ADALAT CC) 60 mg, oral, Daily, Do not crush, chew, or split.    prenatal no115/iron/folic acid (PRENATAL 19 ORAL) 1 capsule, Daily    vedolizumab (ENTYVIO) 300 mg, Once       VITALS  There were no vitals filed for this visit.   Body mass index is 33.47 kg/m².    PHYSICAL EXAM  CONSTITUTIONAL: NAD, appears stated age  EYES: anicteric sclera, sclera clear  HEAD: normocephalic, atraumatic   NECK: supple   PULMONARY: CTAB  CARDIOVASCULAR: RRR, no M/R/G appreciated   ABDOMEN: soft, NTND, +BS, no rebound or guarding   MUSCULOSKELETAL: no edema  SKIN: no jaundice   PSYCHIATRIC: AOx3, appropriate insight and judgement    LABS  WBC (x10*3/uL)   Date Value   09/26/2024 11.2   09/25/2024 14.1 (H)   09/24/2024 14.5 (H)     Hemoglobin (g/dL)   Date Value   09/26/2024 7.9 (L)   09/25/2024 8.4 (L)   09/24/2024 9.3 (L)     Platelets (x10*3/uL)   Date Value   09/26/2024 156   09/25/2024 130 (L)   09/24/2024 137 (L)     Sodium (mmol/L)   Date Value   09/26/2024 139   09/24/2024 133 (L)   09/24/2024 133 (L)     Potassium (mmol/L)   Date Value   09/26/2024 4.6   09/24/2024 4.2   09/24/2024 4.1     Chloride (mmol/L)   Date Value   09/26/2024 107   09/24/2024 101   09/24/2024 102     Bicarbonate (mmol/L)   Date Value   09/26/2024 24   09/24/2024 18 (L)   09/24/2024 20 (L)     Urea Nitrogen (mg/dL)   Date Value   09/26/2024 16   09/24/2024 12   09/24/2024 13     Creatinine (mg/dL)   Date Value   09/26/2024 0.84   09/24/2024 0.75   09/24/2024 0.68     Calcium (mg/dL)   Date Value   09/26/2024 7.0 (L)   09/24/2024 7.3 (L)   09/24/2024 7.0 (L)     Total Protein (g/dL)    Date Value   09/26/2024 5.0 (L)   09/24/2024 5.9 (L)   09/24/2024 5.6 (L)     Bilirubin, Total (mg/dL)   Date Value   09/26/2024 0.2   09/24/2024 0.3   09/24/2024 0.2     Alkaline Phosphatase (U/L)   Date Value   09/26/2024 136 (H)   09/24/2024 195 (H)   09/24/2024 197 (H)     ALT (U/L)   Date Value   09/26/2024 17   09/24/2024 15   09/24/2024 14     AST (U/L)   Date Value   09/26/2024 34   09/24/2024 43 (H)   09/24/2024 48 (H)     Glucose (mg/dL)   Date Value   09/26/2024 86   09/24/2024 98   09/24/2024 89     Lipase (U/L)   Date Value   11/05/2018 35     C-Reactive Protein (mg/dL)   Date Value   05/13/2024 2.86 (H)     CRP (mg/dL)   Date Value   11/11/2021 0.72   11/12/2018 1.57 (A)   11/05/2018 1.19 (A)       ASSESSMENT/PLAN  Archana WHIPPLE Paz is a 34 y.o. female with a past medical history of ADHD, anxiety, and depression following for ileal Crohn's disease (diagnosed 4/2017) in deep remission on Entyvio (started 12/2019). Failed Mesalamine, intolerant of Budesonide, and loss of efficacy with Humira. Colonoscopy showed hemorrhoids (12/2021). Fecal calprotectin 78 (8/2022). Recent pregnancy complicated by pre-eclampsia and continued on Entyvio. She is doing well now and restarts Entyvio with schedule off by only 1 week. She does have anemia likely related to pregnancy so will check repeat labs to better differentiate.    -Continue Entyvio 300 mg infusions every 8 weeks  -Check labs (CBC, CMP, Tspot, ferritin, iron studies, B12, folic acid)  -If evidence of iron deficiency, then will need supplementation     Follow-up in the office in 6 months.    Signature: Angel Kirkland MD

## 2024-12-26 DIAGNOSIS — F41.9 ANXIETY AND DEPRESSION: ICD-10-CM

## 2024-12-26 DIAGNOSIS — F32.A ANXIETY AND DEPRESSION: ICD-10-CM

## 2024-12-26 RX ORDER — ESCITALOPRAM OXALATE 10 MG/1
10 TABLET ORAL DAILY
Qty: 30 TABLET | Refills: 0 | Status: SHIPPED | OUTPATIENT
Start: 2024-12-26

## 2025-01-13 ENCOUNTER — APPOINTMENT (OUTPATIENT)
Dept: PRIMARY CARE | Facility: CLINIC | Age: 35
End: 2025-01-13
Payer: COMMERCIAL

## 2025-01-13 VITALS
DIASTOLIC BLOOD PRESSURE: 80 MMHG | BODY MASS INDEX: 34.15 KG/M2 | SYSTOLIC BLOOD PRESSURE: 122 MMHG | WEIGHT: 200 LBS | HEIGHT: 64 IN | HEART RATE: 64 BPM

## 2025-01-13 DIAGNOSIS — Z00.00 ANNUAL PHYSICAL EXAM: Primary | ICD-10-CM

## 2025-01-13 DIAGNOSIS — F32.A ANXIETY AND DEPRESSION: ICD-10-CM

## 2025-01-13 DIAGNOSIS — F41.9 ANXIETY AND DEPRESSION: ICD-10-CM

## 2025-01-13 LAB
POC APPEARANCE, URINE: CLEAR
POC BILIRUBIN, URINE: NEGATIVE
POC BLOOD, URINE: NEGATIVE
POC COLOR, URINE: YELLOW
POC GLUCOSE, URINE: NEGATIVE MG/DL
POC KETONES, URINE: NEGATIVE MG/DL
POC LEUKOCYTES, URINE: NEGATIVE
POC NITRITE,URINE: NEGATIVE
POC PH, URINE: 6 PH
POC PROTEIN, URINE: NEGATIVE MG/DL
POC SPECIFIC GRAVITY, URINE: 1.02
POC UROBILINOGEN, URINE: 0.2 EU/DL

## 2025-01-13 PROCEDURE — 81002 URINALYSIS NONAUTO W/O SCOPE: CPT | Performed by: FAMILY MEDICINE

## 2025-01-13 PROCEDURE — 99213 OFFICE O/P EST LOW 20 MIN: CPT | Performed by: FAMILY MEDICINE

## 2025-01-13 PROCEDURE — 99395 PREV VISIT EST AGE 18-39: CPT | Performed by: FAMILY MEDICINE

## 2025-01-13 PROCEDURE — 3079F DIAST BP 80-89 MM HG: CPT | Performed by: FAMILY MEDICINE

## 2025-01-13 PROCEDURE — 3074F SYST BP LT 130 MM HG: CPT | Performed by: FAMILY MEDICINE

## 2025-01-13 PROCEDURE — 93000 ELECTROCARDIOGRAM COMPLETE: CPT | Performed by: FAMILY MEDICINE

## 2025-01-13 PROCEDURE — 3008F BODY MASS INDEX DOCD: CPT | Performed by: FAMILY MEDICINE

## 2025-01-13 PROCEDURE — 1036F TOBACCO NON-USER: CPT | Performed by: FAMILY MEDICINE

## 2025-01-13 RX ORDER — ESCITALOPRAM OXALATE 20 MG/1
20 TABLET ORAL DAILY
Qty: 90 TABLET | Refills: 1 | Status: SHIPPED | OUTPATIENT
Start: 2025-01-13 | End: 2025-07-12

## 2025-01-13 NOTE — PROGRESS NOTES
Subjective   Patient ID: Archana Mullen is a 34 y.o. female who presents for Annual Exam, Anxiety, and Depression.    Past Medical, Surgical, and Family History reviewed and updated in chart.    Reviewed all medications by prescribing practitioner or clinical pharmacist (such as prescriptions, OTCs, herbal therapies and supplements) and documented in the medical record.    PRASHANTH Magaña is here for her annual physical examination. She delivered a baby via  on  at 39 weeks and 5 days, following admission for severe preeclampsia.    She was initially admitted for induction at [facility], where she was diagnosed with severe preeclampsia. During her stay, she experienced pseudoseizures, which she has had in the past, and these episodes tend to intensify under stress. The seizures did increase in frequency, leading to her transfer to [facility]. She was placed on magnesium sulfate for management. Ultimately, due to a prolonged induction of labor and lack of progress, Archana underwent a low-transverse  section, delivering a baby girl, Jessi, who was initially admitted to the NICU but is now doing well.    Archana continues to take Lexapro, and would like to continue the medication, requesting a refill.    She continues to be seen by GI for her Crohn's disease, and apparently recently she had some underlying pneumonia, so she had some blood tests done through GI, and that has normalized as well.    She feels fantastic today from a physical and mental standpoint, and as noted above, her child is doing well.  She is currently breast-feeding, but since her pregnancy, she has not had a migraine headache.    Review of Systems  All pertinent positive symptoms are included in the history of present illness.    All other systems have been reviewed and are negative and noncontributory to this patient's current ailments.    Past Medical History:   Diagnosis Date    12 weeks gestation of pregnancy ([facility]) 2024     Anxiety disorder, unspecified 11/16/2022    Anxiety    Chronic migraine without aura, intractable, without status migrainosus 11/16/2022    Intractable chronic migraine without aura and without status migrainosus    Convulsion, non-epileptic (Multi) 2018    evaluated at     Crohn's disease of small intestine without complications (Multi) 08/18/2022    Crohn's disease of ileum without complication    Other urticaria 11/11/2021    Chronic urticaria    Papillomavirus as the cause of diseases classified elsewhere     HPV in female    Personal history of other diseases of the female genital tract     History of abnormal cervical Papanicolaou smear     Past Surgical History:   Procedure Laterality Date    APPENDECTOMY  04/20/2020    COLONOSCOPY      ESOPHAGOGASTRODUODENOSCOPY      OVARIAN CYST REMOVAL Right 08/18/2009    TONSILLECTOMY       Social History     Tobacco Use    Smoking status: Former     Types: Cigarettes     Passive exposure: Past    Smokeless tobacco: Never   Vaping Use    Vaping status: Never Used   Substance Use Topics    Alcohol use: Not Currently    Drug use: Never     Family History   Problem Relation Name Age of Onset    Stroke Maternal Grandmother      Lung cancer Maternal Grandmother      Colon cancer Maternal Grandfather      Stroke Paternal Grandmother      Inflammatory bowel disease Cousin Maternal      Immunization History   Administered Date(s) Administered    Flu vaccine (IIV4), preservative free *Check age/dose* 11/19/2019, 09/13/2020, 11/15/2021, 11/16/2022    Flu vaccine, trivalent, preservative free, age 6 months and greater (Fluarix/Fluzone/Flulaval) 09/09/2024    Influenza, Unspecified 12/01/2017, 10/18/2018, 12/10/2018    MMR vaccine, subcutaneous (MMR II) 04/24/2001    Moderna SARS-CoV-2 Vaccination 05/07/2021, 06/08/2021, 12/30/2021    Pneumococcal conjugate vaccine, 13-valent (PREVNAR 13) 10/27/2017    Td (adult), unspecified 04/24/2001    Tdap vaccine, age 7 year and older  "(BOOSTRIX, ADACEL) 09/26/2015, 08/12/2024     Current Outpatient Medications   Medication Instructions    escitalopram (LEXAPRO) 20 mg, oral, Daily    vedolizumab (ENTYVIO) 300 mg, Once     Allergies   Allergen Reactions    Bupropion Swelling and Rash    Penicillin V Rash     Penicillin V Potassium TABS    Wellbutrin [Bupropion Hcl] Hives       Objective   Vitals:    01/13/25 1319   BP: 122/80   Pulse: 64   Weight: 90.7 kg (200 lb)   Height: 1.626 m (5' 4\")     Body mass index is 34.33 kg/m².    BP Readings from Last 3 Encounters:   01/13/25 122/80   10/02/24 120/72   09/27/24 132/88      Wt Readings from Last 3 Encounters:   01/13/25 90.7 kg (200 lb)   12/02/24 88.5 kg (195 lb)   10/02/24 91.6 kg (202 lb)        Office Visit on 01/13/2025   Component Date Value    POC Color, Urine 01/13/2025 Yellow     POC Appearance, Urine 01/13/2025 Clear     POC Glucose, Urine 01/13/2025 NEGATIVE     POC Bilirubin, Urine 01/13/2025 NEGATIVE     POC Ketones, Urine 01/13/2025 NEGATIVE     POC Specific Gravity, Ur* 01/13/2025 1.020     POC Blood, Urine 01/13/2025 NEGATIVE     POC PH, Urine 01/13/2025 6.0     POC Protein, Urine 01/13/2025 NEGATIVE     POC Urobilinogen, Urine 01/13/2025 0.2     Poc Nitrite, Urine 01/13/2025 NEGATIVE     POC Leukocytes, Urine 01/13/2025 NEGATIVE      Physical Exam  CONSTITUTIONAL - well nourished, well developed, looks like stated age, in no acute distress, not ill-appearing, and not tired appearing  SKIN - normal skin color and pigmentation, normal skin turgor without rash, lesions, or nodules visualized  HEAD - no trauma, normocephalic  EYES - pupils are equal and reactive to light, extraocular muscles are intact, and normal external exam  ENT - TM's intact, no injection, no signs of infection, uvula midline, normal tongue movement and throat normal, no exudate  CHEST - clear to auscultation, no wheezing, no crackles and no rales, good effort  CARDIAC - regular rate and regular rhythm, no skipped " beats, no murmur  EXTREMITIES - no obvious or evident edema, no obvious or evident deformities  NEUROLOGICAL - normal gait, normal balance, normal motor, no ataxia, alert, oriented and no focal signs  PSYCHIATRIC - alert, pleasant and cordial, age-appropriate    Assessment/Plan   Problem List Items Addressed This Visit       Anxiety and depression     Although right now you tolerate and feel well on Lexapro 10 mg daily, I am going to send the medication over is a 20 mg tablet, suggesting that you can certainly cut the tablet in half to make it a 10 mg tablet, but know that you can increase the medication to 20 mg daily if it is felt to be warranted based on your emotional lability         Relevant Medications    escitalopram (Lexapro) 20 mg tablet    Annual physical exam - Primary     Complete history and physical examination was performed  EKG reveals sinus rhythm without acute changes  We will notify of test results once available         Relevant Orders    Lipid Panel    TSH with reflex to Free T4 if abnormal    Comprehensive Metabolic Panel    POCT UA (nonautomated) manually resulted (Completed)    ECG 12 Lead (Completed)

## 2025-01-13 NOTE — ASSESSMENT & PLAN NOTE
Although right now you tolerate and feel well on Lexapro 10 mg daily, I am going to send the medication over is a 20 mg tablet, suggesting that you can certainly cut the tablet in half to make it a 10 mg tablet, but know that you can increase the medication to 20 mg daily if it is felt to be warranted based on your emotional lability

## 2025-04-22 ENCOUNTER — APPOINTMENT (OUTPATIENT)
Dept: PRIMARY CARE | Facility: CLINIC | Age: 35
End: 2025-04-22
Payer: COMMERCIAL

## 2025-04-29 ENCOUNTER — APPOINTMENT (OUTPATIENT)
Dept: PRIMARY CARE | Facility: CLINIC | Age: 35
End: 2025-04-29
Payer: COMMERCIAL

## 2025-06-02 ENCOUNTER — APPOINTMENT (OUTPATIENT)
Dept: GASTROENTEROLOGY | Facility: CLINIC | Age: 35
End: 2025-06-02
Payer: COMMERCIAL

## 2025-06-17 ENCOUNTER — APPOINTMENT (OUTPATIENT)
Dept: PRIMARY CARE | Facility: CLINIC | Age: 35
End: 2025-06-17
Payer: COMMERCIAL

## 2025-06-30 ENCOUNTER — APPOINTMENT (OUTPATIENT)
Dept: GASTROENTEROLOGY | Facility: CLINIC | Age: 35
End: 2025-06-30
Payer: COMMERCIAL

## (undated) DEVICE — SUTURE, VICRYL, 4-0, 27 IN, KS, UNDYED

## (undated) DEVICE — TOWEL PACK, STERILE, 4/PACK, BLUE

## (undated) DEVICE — SUTURE, VICRYL, 0, 36 IN, CT, UNDYED

## (undated) DEVICE — DRAPE PACK, CESAREAN SECTION, CUSTOM, UHC

## (undated) DEVICE — SUTURE, MONOCRYL, 0, 18 IN, VIOLET

## (undated) DEVICE — SUTURE, VICRYL 0, 36 IN, CT-1, VIOLET

## (undated) DEVICE — SUTURE, VICRYL COATED, 0, 36 IN, CP VIOLET

## (undated) DEVICE — GLOVE, SURGICAL, PROTEXIS PI MICRO, 6.5, PF, LF